# Patient Record
Sex: FEMALE | Race: WHITE | Employment: OTHER | URBAN - METROPOLITAN AREA
[De-identification: names, ages, dates, MRNs, and addresses within clinical notes are randomized per-mention and may not be internally consistent; named-entity substitution may affect disease eponyms.]

---

## 2018-01-24 ENCOUNTER — IP HISTORICAL/CONVERTED ENCOUNTER (OUTPATIENT)
Dept: OTHER | Age: 83
End: 2018-01-24

## 2018-02-28 ENCOUNTER — OP HISTORICAL/CONVERTED ENCOUNTER (OUTPATIENT)
Dept: OTHER | Age: 83
End: 2018-02-28

## 2018-10-07 ENCOUNTER — IP HISTORICAL/CONVERTED ENCOUNTER (OUTPATIENT)
Dept: OTHER | Age: 83
End: 2018-10-07

## 2018-10-16 ENCOUNTER — OP HISTORICAL/CONVERTED ENCOUNTER (OUTPATIENT)
Dept: OTHER | Age: 83
End: 2018-10-16

## 2018-11-09 ENCOUNTER — ED HISTORICAL/CONVERTED ENCOUNTER (OUTPATIENT)
Dept: OTHER | Age: 83
End: 2018-11-09

## 2018-11-26 ENCOUNTER — OP HISTORICAL/CONVERTED ENCOUNTER (OUTPATIENT)
Dept: OTHER | Age: 83
End: 2018-11-26

## 2019-06-27 ENCOUNTER — OP HISTORICAL/CONVERTED ENCOUNTER (OUTPATIENT)
Dept: OTHER | Age: 84
End: 2019-06-27

## 2020-08-25 ENCOUNTER — OP HISTORICAL/CONVERTED ENCOUNTER (OUTPATIENT)
Dept: OTHER | Age: 85
End: 2020-08-25

## 2021-10-22 ENCOUNTER — APPOINTMENT (OUTPATIENT)
Dept: CT IMAGING | Age: 86
End: 2021-10-22
Attending: EMERGENCY MEDICINE
Payer: MEDICARE

## 2021-10-22 ENCOUNTER — APPOINTMENT (OUTPATIENT)
Dept: GENERAL RADIOLOGY | Age: 86
End: 2021-10-22
Attending: EMERGENCY MEDICINE
Payer: MEDICARE

## 2021-10-22 ENCOUNTER — HOSPITAL ENCOUNTER (EMERGENCY)
Age: 86
Discharge: HOME | End: 2021-10-22
Attending: EMERGENCY MEDICINE
Payer: MEDICARE

## 2021-10-22 VITALS
HEIGHT: 62 IN | RESPIRATION RATE: 18 BRPM | OXYGEN SATURATION: 96 % | TEMPERATURE: 98.5 F | BODY MASS INDEX: 27.6 KG/M2 | DIASTOLIC BLOOD PRESSURE: 64 MMHG | HEART RATE: 106 BPM | WEIGHT: 150 LBS | SYSTOLIC BLOOD PRESSURE: 117 MMHG

## 2021-10-22 DIAGNOSIS — S80.11XA CONTUSION OF RIGHT LEG, INITIAL ENCOUNTER: Primary | ICD-10-CM

## 2021-10-22 LAB
ALBUMIN SERPL-MCNC: 3 G/DL (ref 3.5–5)
ALBUMIN/GLOB SERPL: 0.9 {RATIO} (ref 1.1–2.2)
ALP SERPL-CCNC: 60 U/L (ref 45–117)
ALT SERPL-CCNC: 15 U/L (ref 12–78)
ANION GAP SERPL CALC-SCNC: 14 MMOL/L (ref 5–15)
AST SERPL W P-5'-P-CCNC: 23 U/L (ref 15–37)
BASOPHILS # BLD: 0 K/UL (ref 0–0.1)
BASOPHILS NFR BLD: 0 % (ref 0–1)
BILIRUB SERPL-MCNC: 0.6 MG/DL (ref 0.2–1)
BUN SERPL-MCNC: 33 MG/DL (ref 6–20)
BUN/CREAT SERPL: 17 (ref 12–20)
CA-I BLD-MCNC: 9.3 MG/DL (ref 8.5–10.1)
CHLORIDE SERPL-SCNC: 102 MMOL/L (ref 97–108)
CO2 SERPL-SCNC: 24 MMOL/L (ref 21–32)
CREAT SERPL-MCNC: 1.96 MG/DL (ref 0.55–1.02)
DIFFERENTIAL METHOD BLD: ABNORMAL
EOSINOPHIL # BLD: 0.1 K/UL (ref 0–0.4)
EOSINOPHIL NFR BLD: 1 % (ref 0–7)
ERYTHROCYTE [DISTWIDTH] IN BLOOD BY AUTOMATED COUNT: 13 % (ref 11.5–14.5)
GLOBULIN SER CALC-MCNC: 3.3 G/DL (ref 2–4)
GLUCOSE SERPL-MCNC: 225 MG/DL (ref 65–100)
HCT VFR BLD AUTO: 42.4 % (ref 35–47)
HGB BLD-MCNC: 14 G/DL (ref 11.5–16)
IMM GRANULOCYTES # BLD AUTO: 0.1 K/UL (ref 0–0.04)
IMM GRANULOCYTES NFR BLD AUTO: 1 % (ref 0–0.5)
LYMPHOCYTES # BLD: 0.8 K/UL (ref 0.8–3.5)
LYMPHOCYTES NFR BLD: 8 % (ref 12–49)
MCH RBC QN AUTO: 30.6 PG (ref 26–34)
MCHC RBC AUTO-ENTMCNC: 33 G/DL (ref 30–36.5)
MCV RBC AUTO: 92.8 FL (ref 80–99)
MONOCYTES # BLD: 0.7 K/UL (ref 0–1)
MONOCYTES NFR BLD: 7 % (ref 5–13)
NEUTS SEG # BLD: 8.2 K/UL (ref 1.8–8)
NEUTS SEG NFR BLD: 83 % (ref 32–75)
PLATELET # BLD AUTO: 175 K/UL (ref 150–400)
PMV BLD AUTO: 10.5 FL (ref 8.9–12.9)
POTASSIUM SERPL-SCNC: 4.2 MMOL/L (ref 3.5–5.1)
PROT SERPL-MCNC: 6.3 G/DL (ref 6.4–8.2)
RBC # BLD AUTO: 4.57 M/UL (ref 3.8–5.2)
SODIUM SERPL-SCNC: 140 MMOL/L (ref 136–145)
WBC # BLD AUTO: 9.9 K/UL (ref 3.6–11)

## 2021-10-22 PROCEDURE — 99283 EMERGENCY DEPT VISIT LOW MDM: CPT

## 2021-10-22 PROCEDURE — 73562 X-RAY EXAM OF KNEE 3: CPT

## 2021-10-22 PROCEDURE — 36415 COLL VENOUS BLD VENIPUNCTURE: CPT

## 2021-10-22 PROCEDURE — 74011250637 HC RX REV CODE- 250/637: Performed by: EMERGENCY MEDICINE

## 2021-10-22 PROCEDURE — 73590 X-RAY EXAM OF LOWER LEG: CPT

## 2021-10-22 PROCEDURE — 74176 CT ABD & PELVIS W/O CONTRAST: CPT

## 2021-10-22 PROCEDURE — 85025 COMPLETE CBC W/AUTO DIFF WBC: CPT

## 2021-10-22 PROCEDURE — 80053 COMPREHEN METABOLIC PANEL: CPT

## 2021-10-22 RX ORDER — DULOXETIN HYDROCHLORIDE 60 MG/1
CAPSULE, DELAYED RELEASE ORAL
COMMUNITY
Start: 2021-10-04 | End: 2022-09-21

## 2021-10-22 RX ORDER — ACETAMINOPHEN 325 MG/1
650 TABLET ORAL ONCE
Status: COMPLETED | OUTPATIENT
Start: 2021-10-22 | End: 2021-10-22

## 2021-10-22 RX ORDER — FUROSEMIDE 40 MG/1
40 TABLET ORAL
COMMUNITY
Start: 2021-03-02 | End: 2022-03-02

## 2021-10-22 RX ORDER — LANOLIN ALCOHOL/MO/W.PET/CERES
325 CREAM (GRAM) TOPICAL
COMMUNITY

## 2021-10-22 RX ORDER — POTASSIUM CHLORIDE 20MEQ/15ML
LIQUID (ML) ORAL DAILY
COMMUNITY

## 2021-10-22 RX ORDER — VERAPAMIL HYDROCHLORIDE 240 MG/1
240 TABLET, FILM COATED, EXTENDED RELEASE ORAL
COMMUNITY
End: 2022-09-21

## 2021-10-22 RX ORDER — MINERAL OIL
180 ENEMA (ML) RECTAL
COMMUNITY
Start: 2021-03-02 | End: 2022-03-02

## 2021-10-22 RX ORDER — INSULIN GLARGINE 100 [IU]/ML
INJECTION, SOLUTION SUBCUTANEOUS AT BEDTIME
COMMUNITY

## 2021-10-22 RX ORDER — HYDRALAZINE HYDROCHLORIDE 50 MG/1
50 TABLET, FILM COATED ORAL
COMMUNITY
Start: 2021-03-02 | End: 2022-03-02

## 2021-10-22 RX ORDER — LISINOPRIL 20 MG/1
20 TABLET ORAL
COMMUNITY
Start: 2021-03-02 | End: 2022-03-02

## 2021-10-22 RX ADMIN — ACETAMINOPHEN 650 MG: 325 TABLET ORAL at 15:50

## 2021-10-22 NOTE — ED TRIAGE NOTES
Here for abd pain started yesterday, constant RUQ, tender to touch, no hx of cholecystectomy, bowel sounds present, No N, V, Diarrhea, BM today normal per pt. Eating less now, drinking ok, states she has fallen x 2 in past week, says her legs gave out, no bruising to RUQ, said she fell into chairs one week ago, no LOC. Never hit head.

## 2021-10-22 NOTE — ED PROVIDER NOTES
EMERGENCY DEPARTMENT HISTORY AND PHYSICAL EXAM      Date: 10/22/2021  Patient Name: Zoila Poe    History of Presenting Illness     Chief Complaint   Patient presents with    Abdominal Pain    Fall       History Provided By: Patient and Patient's Daughter    HPI: Zoila Poe, 80 y.o. female with a past medical history significant diabetes and hypertension presents to the ED with cc of right upper quadrant abdominal pain and right lower extremity pain status post fall. Patient has had 2 falls within the last week and a half. She states that her legs have \"giving out. \"  Patient denies hitting her head or losing consciousness. She is not on any blood thinners. Patient states that her most recent fall was approximately 3 days ago, however presents to the emergency department today for persistent abdominal pain. She has been taking Tylenol with some relief of her symptoms. She denies nausea, vomiting, diarrhea, constipation, fever, chills, dizziness, lightheadedness. There are no other complaints, changes, or physical findings at this time. PCP: Alena Palmer MD    No current facility-administered medications on file prior to encounter. Current Outpatient Medications on File Prior to Encounter   Medication Sig Dispense Refill    DULoxetine (CYMBALTA) 60 mg capsule       ferrous sulfate 325 mg (65 mg iron) tablet Take 325 mg by mouth.  fexofenadine (ALLEGRA) 180 mg tablet Take 180 mg by mouth.  furosemide (LASIX) 40 mg tablet Take 40 mg by mouth.  hydrALAZINE (APRESOLINE) 50 mg tablet Take 50 mg by mouth.  insulin glargine (LANTUS) 100 unit/mL injection by SubCUTAneous route At bedtime.  lisinopriL (PRINIVIL, ZESTRIL) 20 mg tablet Take 20 mg by mouth.  potassium chloride (KAON 10%) 20 mEq/15 mL solution Take  by mouth daily.  SITagliptin (Januvia) 50 mg tablet Take 50 mg by mouth.  verapamil ER (CALAN-SR) 240 mg CR tablet Take 240 mg by mouth nightly. Past History     Past Medical History:  Past Medical History:   Diagnosis Date    Arthritis     Chronic kidney disease     Diabetes ([facility] [address]     Heart failure ([facility] [address]     Hypertension        Past Surgical History:  Past Surgical History:   Procedure Laterality Date    HX BILATERAL SALPINGO-OOPHORECTOMY  1971       Family History:  History reviewed. No pertinent family history. Social History:  Social History     Tobacco Use    Smoking status: Never Smoker    Smokeless tobacco: Never Used   Substance Use Topics    Alcohol use: Never    Drug use: Never       Allergies:  No Known Allergies      Review of Systems     Review of Systems   Constitutional: Negative for chills and fever. HENT: Negative for congestion and sore throat. Eyes: Negative for pain and visual disturbance. Respiratory: Negative for cough and shortness of breath. Cardiovascular: Negative for chest pain and palpitations. Gastrointestinal: Negative for constipation, diarrhea, nausea and vomiting. Endocrine: Negative for cold intolerance and heat intolerance. Genitourinary: Negative for dysuria and frequency. Musculoskeletal: Negative for arthralgias and myalgias. Skin: Negative for color change and rash. Allergic/Immunologic: Negative for environmental allergies and food allergies. Neurological: Negative for dizziness, weakness, light-headedness and headaches. Hematological: Does not bruise/bleed easily. Psychiatric/Behavioral: Negative for agitation and confusion. Physical Exam     Physical Exam  Constitutional:       Appearance: Normal appearance. HENT:      Head: Normocephalic and atraumatic. Right Ear: External ear normal.      Left Ear: External ear normal.      Nose: Nose normal.      Mouth/Throat:      Mouth: Mucous membranes are moist.   Eyes:      Extraocular Movements: Extraocular movements intact.       Conjunctiva/sclera: Conjunctivae normal.      Pupils: Pupils are equal, round, and reactive to light. Cardiovascular:      Rate and Rhythm: Normal rate and regular rhythm. Pulses: Normal pulses. Heart sounds: Normal heart sounds. Pulmonary:      Effort: Pulmonary effort is normal.      Breath sounds: Normal breath sounds. Abdominal:      General: Abdomen is flat. There is no distension. Palpations: Abdomen is soft. Tenderness: There is no abdominal tenderness. Musculoskeletal:         General: Normal range of motion. Cervical back: Normal range of motion. Skin:     General: Skin is warm and dry. Capillary Refill: Capillary refill takes less than 2 seconds. Findings: Ecchymosis present. Comments: Right lower extremity ecchymosis just distal to the knee with associated overlying abrasion   Neurological:      Mental Status: She is alert and oriented to person, place, and time. Mental status is at baseline. Psychiatric:         Mood and Affect: Mood normal.         Behavior: Behavior normal.         Lab and Diagnostic Study Results     Labs -     Recent Results (from the past 12 hour(s))   CBC WITH AUTOMATED DIFF    Collection Time: 10/22/21  1:27 PM   Result Value Ref Range    WBC 9.9 3.6 - 11.0 K/uL    RBC 4.57 3.80 - 5.20 M/uL    HGB 14.0 11.5 - 16.0 g/dL    HCT 42.4 35.0 - 47.0 %    MCV 92.8 80.0 - 99.0 FL    MCH 30.6 26.0 - 34.0 PG    MCHC 33.0 30.0 - 36.5 g/dL    RDW 13.0 11.5 - 14.5 %    PLATELET 230 281 - 842 K/uL    MPV 10.5 8.9 - 12.9 FL    NEUTROPHILS 83 (H) 32 - 75 %    LYMPHOCYTES 8 (L) 12 - 49 %    MONOCYTES 7 5 - 13 %    EOSINOPHILS 1 0 - 7 %    BASOPHILS 0 0 - 1 %    IMMATURE GRANULOCYTES 1 (H) 0.0 - 0.5 %    ABS. NEUTROPHILS 8.2 (H) 1.8 - 8.0 K/UL    ABS. LYMPHOCYTES 0.8 0.8 - 3.5 K/UL    ABS. MONOCYTES 0.7 0.0 - 1.0 K/UL    ABS. EOSINOPHILS 0.1 0.0 - 0.4 K/UL    ABS. BASOPHILS 0.0 0.0 - 0.1 K/UL    ABS. IMM.  GRANS. 0.1 (H) 0.00 - 0.04 K/UL    DF AUTOMATED     METABOLIC PANEL, COMPREHENSIVE    Collection Time: 10/22/21  1:27 PM   Result Value Ref Range    Sodium 140 136 - 145 mmol/L    Potassium 4.2 3.5 - 5.1 mmol/L    Chloride 102 97 - 108 mmol/L    CO2 24 21 - 32 mmol/L    Anion gap 14 5 - 15 mmol/L    Glucose 225 (H) 65 - 100 mg/dL    BUN 33 (H) 6 - 20 mg/dL    Creatinine 1.96 (H) 0.55 - 1.02 mg/dL    BUN/Creatinine ratio 17 12 - 20      GFR est AA 29 (L) >60 ml/min/1.73m2    GFR est non-AA 24 (L) >60 ml/min/1.73m2    Calcium 9.3 8.5 - 10.1 mg/dL    Bilirubin, total 0.6 0.2 - 1.0 mg/dL    AST (SGOT) 23 15 - 37 U/L    ALT (SGPT) 15 12 - 78 U/L    Alk. phosphatase 60 45 - 117 U/L    Protein, total 6.3 (L) 6.4 - 8.2 g/dL    Albumin 3.0 (L) 3.5 - 5.0 g/dL    Globulin 3.3 2.0 - 4.0 g/dL    A-G Ratio 0.9 (L) 1.1 - 2.2         Radiologic Studies -   @lastxrresult@  CT Results  (Last 48 hours)               10/22/21 1421  CT ABD PELV WO CONT Final result    Impression:  No CT evidence for intra-abdominal/pelvic injury. Cholelithiasis. No bowel obstruction. No ascites. Narrative:  CT abdomen and pelvis without IV contrast.       Axial images are reviewed along with reformatted sagittal/coronal images. No IV   contrast administered. Dose reduction: All CT scans at this facility are performed using dose reduction   optimization techniques as appropriate to a performed exam including the   following-   automated exposure control, adjustments of mA and/or Kv according to patient   size, or use of iterative reconstructive technique. Lung bases are clear. No pleural effusion. Nonenhanced images demonstrate unremarkable appearance for the liver. Gallstones   noted dependently within nondistended gallbladder. Normal volume spleen. Pancreas appears unremarkable. Approximate 1 cm nodular content adrenal glands, probable adenomas. Atrophy each kidney. No hydronephrosis. Stomach and small bowel loops are decompressed. Stool and air through colon. No   ascites. Prior hysterectomy. Phleboliths lower pelvis. Advanced atherosclerotic change normal caliber abdominal aorta with extension to   pelvic arteries. Lumbar spondylosis. No lumbar vertebral body compression deformity. No pelvic   fracture. SI joints and hip joints intact. CXR Results  (Last 48 hours)    None            Medical Decision Making   - I am the first provider for this patient. - I reviewed the vital signs, available nursing notes, past medical history, past surgical history, family history and social history. - Initial assessment performed. The patients presenting problems have been discussed, and they are in agreement with the care plan formulated and outlined with them. I have encouraged them to ask questions as they arise throughout their visit. Vital Signs-Reviewed the patient's vital signs. Patient Vitals for the past 12 hrs:   Temp Pulse Resp BP SpO2   10/22/21 1230 98.5 °F (36.9 °C) (!) 106 18 117/64 96 %         The patient presents with abdominal pain status post fall with a differential diagnosis of contusion, abrasion, blunt organ injury, intra-abdominal hemorrhage      ED Course:          Provider Notes (Medical Decision Making):   Patient is an 49-year-old female who presents for evaluation of right upper quadrant abdominal pain worsening over the last couple of days. She has had multiple recent falls with impact to this area. On physical examination, patient has right upper quadrant abdominal tenderness without Schmitz sign, guarding, rebound, rigidity. She does have contusion and ecchymosis to the right lower extremity. Pain treated with p.o. Tylenol. CBC, CMP, right tib-fib x-ray, CT abdomen pelvis without evidence of acute fracture or injury. Suspect contusion secondary to mechanical fall. Patient instructed to follow-up with her primary care physician and return to the ED if she develops any new or worsening symptoms. She is requesting to go home at this time.   [facility] Procedures   Medical Decision Makingedical Decision Making  Performed by: Celia Duran DO  Procedures       Disposition   Disposition: Condition stable  DC- Adult Discharges: All of the diagnostic tests were reviewed and questions answered. Diagnosis, care plan and treatment options were discussed. The patient understands the instructions and will follow up as directed. The patients results have been reviewed with them. They have been counseled regarding their diagnosis. The patient and family member patient and daughter verbally convey understanding and agreement of the signs, symptoms, diagnosis, treatment and prognosis and additionally agrees to follow up as recommended with their PCP in 24 - 48 hours. They also agree with the care-plan and convey that all of their questions have been answered. I have also put together some discharge instructions for them that include: 1) educational information regarding their diagnosis, 2) how to care for their diagnosis at home, as well a 3) list of reasons why they would want to return to the ED prior to their follow-up appointment, should their condition change. DC-The patient was given verbal contusion instructions    Discharged    DISCHARGE PLAN:  1. Cannot display discharge medications since this patient is not currently admitted. 2.   Follow-up Information     Follow up With Specialties Details Why Contact Info    Freya Olszewski, MD Family Medicine Schedule an appointment as soon as possible for a visit   [facility]      [address] [place] [address] Emergency Medicine  As needed, If symptoms worsen [address]  [phone]        3. Return to ED if worse   4. Discharge Medication List as of 10/22/2021  3:39 PM            Diagnosis     Clinical Impression:   1.  Contusion of right leg, initial encounter        Attestations:    Celia Duran DO    Please note that this dictation was completed with FU[employer]YUAN, the [employer] voice recognition software. Quite often unanticipated grammatical, syntax, homophones, and other interpretive errors are inadvertently transcribed by the computer software. Please disregard these errors. Please excuse any errors that have escaped final proofreading. Thank you.

## 2022-04-27 ENCOUNTER — HOSPITAL ENCOUNTER (EMERGENCY)
Age: 87
Discharge: HOME | End: 2022-04-27
Attending: STUDENT IN AN ORGANIZED HEALTH CARE EDUCATION/TRAINING PROGRAM
Payer: MEDICARE

## 2022-04-27 ENCOUNTER — APPOINTMENT (OUTPATIENT)
Dept: CT IMAGING | Age: 87
End: 2022-04-27
Attending: STUDENT IN AN ORGANIZED HEALTH CARE EDUCATION/TRAINING PROGRAM
Payer: MEDICARE

## 2022-04-27 VITALS
BODY MASS INDEX: 29.07 KG/M2 | TEMPERATURE: 98.3 F | SYSTOLIC BLOOD PRESSURE: 169 MMHG | RESPIRATION RATE: 16 BRPM | WEIGHT: 154 LBS | HEIGHT: 61 IN | DIASTOLIC BLOOD PRESSURE: 85 MMHG | OXYGEN SATURATION: 95 %

## 2022-04-27 DIAGNOSIS — S32.020A CLOSED COMPRESSION FRACTURE OF L2 LUMBAR VERTEBRA, INITIAL ENCOUNTER (HCC): Primary | ICD-10-CM

## 2022-04-27 DIAGNOSIS — N30.00 ACUTE CYSTITIS WITHOUT HEMATURIA: ICD-10-CM

## 2022-04-27 LAB
APPEARANCE UR: CLEAR
BACTERIA URNS QL MICRO: ABNORMAL /HPF
BILIRUB UR QL: ABNORMAL
COLOR UR: YELLOW
EPITH CASTS URNS QL MICRO: ABNORMAL /LPF
GLUCOSE UR STRIP.AUTO-MCNC: NEGATIVE MG/DL
HGB UR QL STRIP: NEGATIVE
KETONES UR QL STRIP.AUTO: 15 MG/DL
LEUKOCYTE ESTERASE UR QL STRIP.AUTO: NEGATIVE
NITRITE UR QL STRIP.AUTO: NEGATIVE
PH UR STRIP: 5 [PH] (ref 5–8)
PROT UR STRIP-MCNC: 30 MG/DL
RBC #/AREA URNS HPF: ABNORMAL /HPF (ref 0–5)
SP GR UR REFRACTOMETRY: 1.02 (ref 1–1.03)
UA: UC IF INDICATED,UAUC: ABNORMAL
UROBILINOGEN UR QL STRIP.AUTO: 0.1 EU/DL (ref 0.2–1)
WBC URNS QL MICRO: ABNORMAL /HPF (ref 0–4)
YEAST URNS QL MICRO: PRESENT

## 2022-04-27 PROCEDURE — 74176 CT ABD & PELVIS W/O CONTRAST: CPT

## 2022-04-27 PROCEDURE — 99284 EMERGENCY DEPT VISIT MOD MDM: CPT

## 2022-04-27 PROCEDURE — 81001 URINALYSIS AUTO W/SCOPE: CPT

## 2022-04-27 PROCEDURE — 74011250637 HC RX REV CODE- 250/637: Performed by: STUDENT IN AN ORGANIZED HEALTH CARE EDUCATION/TRAINING PROGRAM

## 2022-04-27 PROCEDURE — 72131 CT LUMBAR SPINE W/O DYE: CPT

## 2022-04-27 RX ORDER — HYDROCODONE BITARTRATE AND ACETAMINOPHEN 5; 325 MG/1; MG/1
1 TABLET ORAL
Qty: 12 TABLET | Refills: 0 | Status: SHIPPED | OUTPATIENT
Start: 2022-04-27 | End: 2022-04-30

## 2022-04-27 RX ORDER — ACETAMINOPHEN 325 MG/1
650 TABLET ORAL
Qty: 20 TABLET | Refills: 0 | Status: SHIPPED | OUTPATIENT
Start: 2022-04-27 | End: 2022-09-19 | Stop reason: SDUPTHER

## 2022-04-27 RX ORDER — HYDROCODONE BITARTRATE AND ACETAMINOPHEN 5; 325 MG/1; MG/1
1 TABLET ORAL ONCE
Status: COMPLETED | OUTPATIENT
Start: 2022-04-27 | End: 2022-04-27

## 2022-04-27 RX ORDER — HYDRALAZINE HYDROCHLORIDE 50 MG/1
25 TABLET, FILM COATED ORAL 3 TIMES DAILY
COMMUNITY
End: 2022-09-21

## 2022-04-27 RX ORDER — METHOCARBAMOL 750 MG/1
750 TABLET, FILM COATED ORAL
Qty: 20 TABLET | Refills: 0 | Status: SHIPPED | OUTPATIENT
Start: 2022-04-27 | End: 2022-09-19

## 2022-04-27 RX ORDER — LEVOFLOXACIN 750 MG/1
750 TABLET ORAL DAILY
Qty: 7 TABLET | Refills: 0 | Status: SHIPPED | OUTPATIENT
Start: 2022-04-27 | End: 2022-05-04

## 2022-04-27 RX ORDER — METHOCARBAMOL 500 MG/1
750 TABLET, FILM COATED ORAL ONCE
Status: COMPLETED | OUTPATIENT
Start: 2022-04-27 | End: 2022-04-27

## 2022-04-27 RX ADMIN — METHOCARBAMOL TABLETS 750 MG: 500 TABLET, COATED ORAL at 11:44

## 2022-04-27 RX ADMIN — HYDROCODONE BITARTRATE AND ACETAMINOPHEN 1 TABLET: 5; 325 TABLET ORAL at 11:42

## 2022-04-27 NOTE — ED PROVIDER NOTES
[id number]  EMERGENCY DEPARTMENT ENCOUNTER NOTE    Date: 4/27/2022  Patient Name: Nava Lee    History of Presenting Illness     Chief Complaint   Patient presents with    Back Pain     HPI: Nava Lee, 80 y.o. female with a past medical history and home medications as listed below presenting for back pain. The patient presents with low back pain that is described as constant lower lumbar midline and paravetebral pain. - Prior episodes: not as severe  - Weakness or numbness in upper or lower extremities: not worse than chronic weakness  - Trauma: no  - Urine incontinence or retention: no  - Active malignancy: no  - IV drug use: no  - Fever: no  - Medications taken for pain prior to arrival: tylenol and unknwn medicine with minimal relief    Patient denies any abdominal pain, nausea, or vomiting. No dysuria or hematuria. No chest pain or shortness of breath. No other acute complaints. Medical History   I reviewed the medical, surgical, family, and social history, as well as allergies:    PCP: Arnulfo Gan MD    Past Medical History:  Past Medical History:   Diagnosis Date    Arthritis     Chronic kidney disease     Diabetes ([facility] [address]     Heart failure ([facility] [address]     Hypertension      Past Surgical History:  Past Surgical History:   Procedure Laterality Date    HX APPENDECTOMY      HX BILATERAL SALPINGO-OOPHORECTOMY  1971    HX HEENT      lump removal on head    HX HYSTERECTOMY       Current Outpatient Medications:  Current Outpatient Medications   Medication Instructions    acetaminophen (TYLENOL) 650 mg, Oral, EVERY 6 HOURS AS NEEDED    DULoxetine (CYMBALTA) 60 mg capsule No dose, route, or frequency recorded.     ferrous sulfate 325 mg, Oral    hydrALAZINE (APRESOLINE) 25 mg, Oral, 3 TIMES DAILY    HYDROcodone-acetaminophen (Norco) 5-325 mg per tablet 1 Tablet, Oral, EVERY 6 HOURS AS NEEDED    insulin glargine (LANTUS) 100 unit/mL injection SubCUTAneous, AT BEDTIME    levoFLOXacin (LEVAQUIN) 750 mg, Oral, DAILY    methocarbamoL (ROBAXIN-750) 750 mg, Oral, 3 TIMES DAILY AS NEEDED    potassium chloride (KAON 10%) 20 mEq/15 mL solution Oral, DAILY    verapamil ER (CALAN-SR) 240 mg, Oral, EVERY BEDTIME      Family History:  History reviewed. No pertinent family history. Social History:  Social History     Tobacco Use    Smoking status: Never Smoker    Smokeless tobacco: Never Used   Vaping Use    Vaping Use: Never used   Substance Use Topics    Alcohol use: Never    Drug use: Never     Allergies:  No Known Allergies    Review of Systems     All other systems negative. Physical Exam and Vital Signs   Vital Signs - Reviewed the patient's vital signs. Patient Vitals for the past 12 hrs:   Temp Resp BP SpO2   04/27/22 1058 98.3 °F (36.8 °C) 16 (!) 169/85 95 %     Physical Exam:    GENERAL: awake, alert, cooperative, not in distress  HEENT:  * Pupils equal, EOMI  * Head atraumatic  CV:  * Audible heart sounds  * warm and perfused extremities bilaterally  PULMONARY: Good air movement, no wheezes, no crackles  ABDOMEN/: soft, no distension, no guarding, noted suprapubic tenderness, no abdominal tenderness. EXTREMITIES: warm and perfused, no tenderness, no edema  BACK  * No obvious trauma to the back, no ecchymosis, scoliosis, lacerations, or abrasions  * Noted lumbar midline back tenderness  * Noted lumbar paravertebral back tenderness  * No stepoffs or deformities  * Normal power and sensation in bilateral lower extremities  * Normal DP pulses in bilateral lower extremities  * Negative straight leg raise test  * Normal bilateral hip and knee ROM. No tenderness. SKIN: no rashes or signs of trauma  NEURO:  * Speech clear  * Moves U&LE to command    Medical Decision Making   - I am the first and primary provider for this patient and am the primary provider of record.   - I reviewed the vital signs, available nursing notes, past medical history, past surgical history, family history and social history. - Initial assessment performed. The patients presenting problems have been discussed, and the staff are in agreement with the care plan formulated and outlined with them. I have encouraged them to ask questions as they arise throughout their visit. - Available medical records, nursing notes, old EKGs, and EMS run sheets (if patient was EMS transported) were reviewed    MDM:   Patient is a 80 y.o. female presenting for back pain. Vitals reveal no significant abnormalities and physical exam reveals lumbar tenderness. Based on the history, physical exam, risk factors, and vitals signs, I favor the following differential diagnoses: muscle strain, disk herniation/pathology, radiculopathy, neuropathic pain, sprain, muscle spasm, trauma. I doubt cauda equina syndrome due to lack of neurologic findings or red flags. I doubt UTI or pyelonephritis due to absence of urinary symptoms. I doubt aortic pathology due to stable vitals, no red flags on history, and exam consistent with muscular back pain. I doubt ureterolithiasis due to non-suggestive history and examination. Due to age, will test for urine and will obtain CT abdomen to rule out nephrolithiasis and CT lumbar to rule out any cancer or any acute process or compression fractures. The patient has normal vital signs and does not have any sirs criteria with a normal heart rate of 80bpm and respiratory rate on my exam.  We will also test urine to rule out UTI.     Results     Labs:  Recent Results (from the past 12 hour(s))   URINALYSIS W/ REFLEX CULTURE    Collection Time: 04/27/22 11:11 AM    Specimen: Urine   Result Value Ref Range    Color Yellow      Appearance Clear Clear      Specific gravity 1.020 1.003 - 1.030      pH (UA) 5.0 5.0 - 8.0      Protein 30 (A) Negative mg/dL    Glucose Negative Negative mg/dL    Ketone 15 (A) Negative mg/dL    Bilirubin Small (A) Negative      Blood Negative Negative Urobilinogen 0.1 (L) 0.2 - 1.0 EU/dL    Nitrites Negative Negative      Leukocyte Esterase Negative Negative      WBC 5-10 0 - 4 /hpf    RBC 0-5 0 - 5 /hpf    Epithelial cells Many (A) Few /lpf    Bacteria 2+ (A) Negative /hpf    UA:UC IF INDICATED Culture not indicated by UA result Culture not indicated by UA result      Yeast Present (A) Negative       Radiologic Studies:  CT Results  (Last 48 hours)               04/27/22 1206  CT ABD PELV WO CONT Final result    Impression:  New L2 compression deformity as above. Pathologic fracture not   excluded. MRI lumbar spine may provide more detail. Cholelithiasis. No bowel obstruction. Colonic diverticulosis. No CT evidence for appendicitis or   diverticulitis. Advanced abdominal aorta and pelvic artery atherosclerosis. Cortical atrophy bilateral kidneys. Renovascular calcifications. No   hydronephrosis. Narrative:  CT abdomen and pelvis without IV contrast.       Comparison CT abdomen and pelvis October 22, 2021. Axial images are reviewed along with reformatted sagittal/coronal images. No IV   contrast administered. Dose reduction: All CT scans at this facility are performed using dose reduction   optimization techniques as appropriate to a performed exam including the   following-   automated exposure control, adjustments of mA and/or Kv according to patient   size, or use of iterative reconstructive technique. Basilar subsegmental atelectasis. CAD. No pleural effusion. Nonenhanced images demonstrate unremarkable appearance for the liver. Gallstones   noted dependently within gallbladder lumen. Pancreas appears unremarkable. Similar adreniform enlargement bilateral adrenal glands. Normal volume spleen. Cortical atrophy bilateral kidneys unchanged. Renovascular calcifications. No   hydronephrosis. Stomach and small bowel loops are decompressed. Stool and air through colon.  Few scattered colonic diverticula. No CT evidence for appendicitis or   diverticulitis. Bladder nondistended. Dense intimal calcification abdominal aorta with extension into abdominal aorta   and branch vessels and pelvic arteries. Advanced abdominal aorta   atherosclerosis. Lumbar spondylosis. Interval L2 compression deformity with loss of vertebral   body height by approximately 25%. Mild vertebral body sclerosis present. Pathologic fracture not excluded. CXR Results  (Last 48 hours)    None        Medications ordered:  Medications   methocarbamoL (ROBAXIN) tablet 750 mg (750 mg Oral Given 4/27/22 1144)   HYDROcodone-acetaminophen (NORCO) 5-325 mg per tablet 1 Tablet (1 Tablet Oral Given 4/27/22 1142)     ED Course and Reassessment     ED Course:     ED Course as of 04/27/22 1316   Wed Apr 27, 2022   1224 Bacteria noted on UA. No SIRS criteria. Uncomplicated UTI. Will treat with atbx. [SS]   1310 A L2 compression fracture of 25% was noted on CT scan. No other acute abnormality noted. Will give pain medicines and follow-up with spine surgery. A consult was placed to interventional radiology for possible kyphoplasty. [SS]      ED Course User Index  [SS] Aissatou Herman MD       Reassessment:    The patient presents with low back pain without evidence for a more malignant injury or process. Evaluation in the ED was +ve for UTI and L2 compression fx. The patient had improvement of the symptoms with the interventions in the emergency department. After ED evaluation, and due to the absence of findings that indicate neurologic deficits or alarming physical exam or historical features, the patient is a good candidate for outpatient symptomatic management. I instructed the patient that back pain of this nature will take time to improve, but it often does.  Patient was given instructions to return to the emergency department or call primary physician with any worsening symptoms including but not limited to: numbness or weakness in the legs, bowel or bladder problems, numbness in the groin. Patient was also instructed to return or call with any worsening symptomatology or questions. After completion of evaluation and discussion of results and diagnoses with the patient, all the patient's questions were answered. If required, all follow up appointments and treatments were discussed and explained. The patient sounded understanding and agrees with the plan. The patient understands that at this time there is no evidence for a more malignant underlying process, but the patient also understands that early in the process of an illness, an emergency department workup can be falsely reassuring. Routine discharge counseling was given to the patient and the patient understands that worsening, changing or persistent symptoms should prompt an immediate call or follow up with their primary physician or the emergency department. The importance of appropriate follow up was also discussed with the patient. More extensive discharge instructions were given in the patient's discharge paperwork. Final Disposition     DISPOSITION: DISCHARGED    Patient will be discharged from the Emergency Department in stable condition. All of the diagnostic tests were reviewed and any questions were answered. Diagnosis, results, follow up if applicable, and return precautions were discussed. I have also put together printed discharge instructions for them that include: 1) educational information regarding their diagnosis, 2) how to care for their diagnosis at home, as well a 3) list of reasons why they would want to return to the ED prior to their follow-up appointment, should their condition change. Any labs or imaging done in the ED will be either printed with the discharge paperwork or available through [address] DISCHARGE PLAN:  1.    Current Discharge Medication List      CONTINUE these medications which have NOT CHANGED    Details   hydrALAZINE (APRESOLINE) 50 mg tablet Take 25 mg by mouth three (3) times daily. DULoxetine (CYMBALTA) 60 mg capsule       ferrous sulfate 325 mg (65 mg iron) tablet Take 325 mg by mouth. insulin glargine (LANTUS) 100 unit/mL injection by SubCUTAneous route At bedtime. potassium chloride (KAON 10%) 20 mEq/15 mL solution Take  by mouth daily. verapamil ER (CALAN-SR) 240 mg CR tablet Take 240 mg by mouth nightly. 2.   Follow-up Information     Follow up With Specialties Details Why Contact Info    Sydni Rolle MD Orthopedic Surgery Schedule an appointment as soon as possible for a visit in 1 week  [address] [place] [address]  [phone]      [address] [place] [address] Emergency Medicine Go to  If symptoms worsen [address]  [phone]        3. Return to ED if worse    4. Current Discharge Medication List      START taking these medications    Details   levoFLOXacin (LEVAQUIN) 750 mg tablet Take 1 Tablet by mouth daily for 7 days. Qty: 7 Tablet, Refills: 0  Start date: 4/27/2022, End date: 5/4/2022      acetaminophen (TYLENOL) 325 mg tablet Take 2 Tablets by mouth every six (6) hours as needed for Pain. Qty: 20 Tablet, Refills: 0  Start date: 4/27/2022      methocarbamoL (Robaxin-750) 750 mg tablet Take 1 Tablet by mouth three (3) times daily as needed for Muscle Spasm(s). Qty: 20 Tablet, Refills: 0  Start date: 4/27/2022      HYDROcodone-acetaminophen (Norco) 5-325 mg per tablet Take 1 Tablet by mouth every six (6) hours as needed for Pain for up to 3 days. Max Daily Amount: 4 Tablets. Qty: 12 Tablet, Refills: 0  Start date: 4/27/2022, End date: 4/30/2022    Associated Diagnoses: Acute cystitis without hematuria             Diagnosis     Clinical Impression:   1. Closed compression fracture of L2 lumbar vertebra, initial encounter ([facility] [address]    2.  Acute cystitis without hematuria Attestations:    Jimmie Samson MD    Please note that this dictation was completed with [employer], the computer voice recognition software. Quite often unanticipated grammatical, syntax, homophones, and other interpretive errors are inadvertently transcribed by the computer software. Please disregard these errors. Please excuse any errors that have escaped final proofreading. Thank you.

## 2022-04-27 NOTE — ED TRIAGE NOTES
Pt states severe mid back pain that radiates through buttocks for one week. No recent falls or trauma.  ESR.

## 2022-04-27 NOTE — DISCHARGE INSTRUCTIONS
Thank you! Thank you for allowing me to care for you in the emergency department. I sincerely hope that you are satisfied with your visit today. It is my goal to provide you with excellent care. Below you will find a list of your labs and imaging from your visit today. Should you have any questions regarding these results please do not hesitate to call the emergency department. Labs -     Recent Results (from the past 12 hour(s))   URINALYSIS W/ REFLEX CULTURE    Collection Time: 04/27/22 11:11 AM    Specimen: Urine   Result Value Ref Range    Color Yellow      Appearance Clear Clear      Specific gravity 1.020 1.003 - 1.030      pH (UA) 5.0 5.0 - 8.0      Protein 30 (A) Negative mg/dL    Glucose Negative Negative mg/dL    Ketone 15 (A) Negative mg/dL    Bilirubin Small (A) Negative      Blood Negative Negative      Urobilinogen 0.1 (L) 0.2 - 1.0 EU/dL    Nitrites Negative Negative      Leukocyte Esterase Negative Negative      WBC 5-10 0 - 4 /hpf    RBC 0-5 0 - 5 /hpf    Epithelial cells Many (A) Few /lpf    Bacteria 2+ (A) Negative /hpf    UA:UC IF INDICATED Culture not indicated by UA result Culture not indicated by UA result      Yeast Present (A) Negative         Radiologic Studies -   CT ABD PELV WO CONT   Final Result   New L2 compression deformity as above. Pathologic fracture not   excluded. MRI lumbar spine may provide more detail. Cholelithiasis. No bowel obstruction. Colonic diverticulosis. No CT evidence for appendicitis or   diverticulitis. Advanced abdominal aorta and pelvic artery atherosclerosis. Cortical atrophy bilateral kidneys. Renovascular calcifications. No   hydronephrosis. CT SPINE LUMB WO CONT    (Results Pending)     CT Results  (Last 48 hours)                 04/27/22 1206  CT ABD PELV WO CONT Final result    Impression:  New L2 compression deformity as above. Pathologic fracture not   excluded. MRI lumbar spine may provide more detail. Cholelithiasis. No bowel obstruction. Colonic diverticulosis. No CT evidence for appendicitis or   diverticulitis. Advanced abdominal aorta and pelvic artery atherosclerosis. Cortical atrophy bilateral kidneys. Renovascular calcifications. No   hydronephrosis. Narrative:  CT abdomen and pelvis without IV contrast.       Comparison CT abdomen and pelvis October 22, 2021. Axial images are reviewed along with reformatted sagittal/coronal images. No IV   contrast administered. Dose reduction: All CT scans at this facility are performed using dose reduction   optimization techniques as appropriate to a performed exam including the   following-   automated exposure control, adjustments of mA and/or Kv according to patient   size, or use of iterative reconstructive technique. Basilar subsegmental atelectasis. CAD. No pleural effusion. Nonenhanced images demonstrate unremarkable appearance for the liver. Gallstones   noted dependently within gallbladder lumen. Pancreas appears unremarkable. Similar adreniform enlargement bilateral adrenal glands. Normal volume spleen. Cortical atrophy bilateral kidneys unchanged. Renovascular calcifications. No   hydronephrosis. Stomach and small bowel loops are decompressed. Stool and air through colon. Few   scattered colonic diverticula. No CT evidence for appendicitis or   diverticulitis. Bladder nondistended. Dense intimal calcification abdominal aorta with extension into abdominal aorta   and branch vessels and pelvic arteries. Advanced abdominal aorta   atherosclerosis. Lumbar spondylosis. Interval L2 compression deformity with loss of vertebral   body height by approximately 25%. Mild vertebral body sclerosis present. Pathologic fracture not excluded.                  CXR Results  (Last 48 hours)      None               If you feel that you have not received excellent quality care or timely care, please ask to speak to the nurse manager. Please choose us in the future for your continued health care needs. ------------------------------------------------------------------------------------------------------------  The exam and treatment you received in the Emergency Department were for an urgent problem and are not intended as complete care. It is important that you follow-up with a doctor, nurse practitioner, or physician assistant to:  (1) confirm your diagnosis,  (2) re-evaluation of changes in your illness and treatment, and  (3) for ongoing care. If your symptoms become worse or you do not improve as expected and you are unable to reach your usual health care provider, you should return to the Emergency Department. We are available 24 hours a day. Please take your discharge instructions with you when you go to your follow-up appointment. If a prescription has been provided, please have it filled as soon as possible to prevent a delay in treatment. Read the entire medication instruction sheet provided to you by the pharmacy. If you have any questions or reservations about taking the medication due to side effects or interactions with other medications, please call your primary care physician or contact the ER to speak with the charge nurse. Make an appointment with your family doctor or the physician you were referred to for follow-up of this visit as instructed on your discharge paperwork, as this is a mandatory follow-up. Return to the ER if you are unable to be seen or if you are unable to be seen in a timely manner. If you have any problem arranging the follow-up visit, contact the Emergency Department immediately.

## 2022-05-01 ENCOUNTER — APPOINTMENT (OUTPATIENT)
Dept: CT IMAGING | Age: 87
DRG: 478 | End: 2022-05-01
Attending: EMERGENCY MEDICINE
Payer: MEDICARE

## 2022-05-01 ENCOUNTER — HOSPITAL ENCOUNTER (INPATIENT)
Age: 87
LOS: 4 days | Discharge: NURSING, REHAB OR LONG-TERM CARE | DRG: 478 | End: 2022-05-06
Attending: EMERGENCY MEDICINE | Admitting: INTERNAL MEDICINE
Payer: MEDICARE

## 2022-05-01 DIAGNOSIS — S32.020A: ICD-10-CM

## 2022-05-01 DIAGNOSIS — S32.020A CLOSED COMPRESSION FRACTURE OF L2 LUMBAR VERTEBRA, INITIAL ENCOUNTER (HCC): Primary | ICD-10-CM

## 2022-05-01 LAB
ABO + RH BLD: NORMAL
ALBUMIN SERPL-MCNC: 2.9 G/DL (ref 3.5–5)
ALBUMIN/GLOB SERPL: 1 {RATIO} (ref 1.1–2.2)
ALP SERPL-CCNC: 71 U/L (ref 45–117)
ALT SERPL-CCNC: 10 U/L (ref 12–78)
ANION GAP SERPL CALC-SCNC: 5 MMOL/L (ref 5–15)
AST SERPL W P-5'-P-CCNC: 10 U/L (ref 15–37)
BASOPHILS # BLD: 0.1 K/UL (ref 0–0.1)
BASOPHILS NFR BLD: 1 % (ref 0–1)
BILIRUB SERPL-MCNC: 0.3 MG/DL (ref 0.2–1)
BLOOD GROUP ANTIBODIES SERPL: NEGATIVE
BUN SERPL-MCNC: 37 MG/DL (ref 6–20)
BUN/CREAT SERPL: 18 (ref 12–20)
CA-I BLD-MCNC: 9 MG/DL (ref 8.5–10.1)
CHLORIDE SERPL-SCNC: 104 MMOL/L (ref 97–108)
CO2 SERPL-SCNC: 28 MMOL/L (ref 21–32)
CREAT SERPL-MCNC: 2.02 MG/DL (ref 0.55–1.02)
DIFFERENTIAL METHOD BLD: ABNORMAL
EOSINOPHIL # BLD: 0.1 K/UL (ref 0–0.4)
EOSINOPHIL NFR BLD: 2 % (ref 0–7)
ERYTHROCYTE [DISTWIDTH] IN BLOOD BY AUTOMATED COUNT: 13.8 % (ref 11.5–14.5)
GLOBULIN SER CALC-MCNC: 3 G/DL (ref 2–4)
GLUCOSE BLD STRIP.AUTO-MCNC: 134 MG/DL (ref 65–117)
GLUCOSE BLD STRIP.AUTO-MCNC: 167 MG/DL (ref 65–117)
GLUCOSE SERPL-MCNC: 167 MG/DL (ref 65–100)
HCT VFR BLD AUTO: 37.4 % (ref 35–47)
HGB BLD-MCNC: 11.7 G/DL (ref 11.5–16)
IMM GRANULOCYTES # BLD AUTO: 0.1 K/UL (ref 0–0.04)
IMM GRANULOCYTES NFR BLD AUTO: 1 % (ref 0–0.5)
LYMPHOCYTES # BLD: 1.5 K/UL (ref 0.8–3.5)
LYMPHOCYTES NFR BLD: 16 % (ref 12–49)
MCH RBC QN AUTO: 29.3 PG (ref 26–34)
MCHC RBC AUTO-ENTMCNC: 31.3 G/DL (ref 30–36.5)
MCV RBC AUTO: 93.7 FL (ref 80–99)
MONOCYTES # BLD: 0.8 K/UL (ref 0–1)
MONOCYTES NFR BLD: 9 % (ref 5–13)
NEUTS SEG # BLD: 6.9 K/UL (ref 1.8–8)
NEUTS SEG NFR BLD: 71 % (ref 32–75)
NRBC # BLD: 0 K/UL (ref 0–0.01)
NRBC BLD-RTO: 0 PER 100 WBC
PERFORMED BY, TECHID: ABNORMAL
PERFORMED BY, TECHID: ABNORMAL
PLATELET # BLD AUTO: 278 K/UL (ref 150–400)
PMV BLD AUTO: 9.3 FL (ref 8.9–12.9)
POTASSIUM SERPL-SCNC: 3.8 MMOL/L (ref 3.5–5.1)
PROT SERPL-MCNC: 5.9 G/DL (ref 6.4–8.2)
RBC # BLD AUTO: 3.99 M/UL (ref 3.8–5.2)
SODIUM SERPL-SCNC: 137 MMOL/L (ref 136–145)
SPECIMEN EXP DATE BLD: NORMAL
WBC # BLD AUTO: 9.5 K/UL (ref 3.6–11)

## 2022-05-01 PROCEDURE — 36415 COLL VENOUS BLD VENIPUNCTURE: CPT

## 2022-05-01 PROCEDURE — 80053 COMPREHEN METABOLIC PANEL: CPT

## 2022-05-01 PROCEDURE — 93005 ELECTROCARDIOGRAM TRACING: CPT

## 2022-05-01 PROCEDURE — 74011000250 HC RX REV CODE- 250: Performed by: NURSE PRACTITIONER

## 2022-05-01 PROCEDURE — 99285 EMERGENCY DEPT VISIT HI MDM: CPT

## 2022-05-01 PROCEDURE — 86900 BLOOD TYPING SEROLOGIC ABO: CPT

## 2022-05-01 PROCEDURE — 74011250637 HC RX REV CODE- 250/637: Performed by: EMERGENCY MEDICINE

## 2022-05-01 PROCEDURE — 74011000250 HC RX REV CODE- 250: Performed by: EMERGENCY MEDICINE

## 2022-05-01 PROCEDURE — G0378 HOSPITAL OBSERVATION PER HR: HCPCS

## 2022-05-01 PROCEDURE — 74011250637 HC RX REV CODE- 250/637: Performed by: NURSE PRACTITIONER

## 2022-05-01 PROCEDURE — 85025 COMPLETE CBC W/AUTO DIFF WBC: CPT

## 2022-05-01 PROCEDURE — 82962 GLUCOSE BLOOD TEST: CPT

## 2022-05-01 RX ORDER — HYDROCODONE BITARTRATE AND ACETAMINOPHEN 5; 325 MG/1; MG/1
1 TABLET ORAL
Status: COMPLETED | OUTPATIENT
Start: 2022-05-01 | End: 2022-05-01

## 2022-05-01 RX ORDER — LISINOPRIL 10 MG/1
20 TABLET ORAL DAILY
Status: DISCONTINUED | OUTPATIENT
Start: 2022-05-02 | End: 2022-05-06 | Stop reason: HOSPADM

## 2022-05-01 RX ORDER — SODIUM CHLORIDE 0.9 % (FLUSH) 0.9 %
5-40 SYRINGE (ML) INJECTION EVERY 8 HOURS
Status: DISCONTINUED | OUTPATIENT
Start: 2022-05-01 | End: 2022-05-06 | Stop reason: HOSPADM

## 2022-05-01 RX ORDER — LANOLIN ALCOHOL/MO/W.PET/CERES
3 CREAM (GRAM) TOPICAL
COMMUNITY

## 2022-05-01 RX ORDER — FUROSEMIDE 40 MG/1
40 TABLET ORAL DAILY
COMMUNITY

## 2022-05-01 RX ORDER — ALLOPURINOL 300 MG/1
300 TABLET ORAL DAILY
COMMUNITY

## 2022-05-01 RX ORDER — MELATONIN
2000 DAILY
Status: DISCONTINUED | OUTPATIENT
Start: 2022-05-02 | End: 2022-05-06 | Stop reason: HOSPADM

## 2022-05-01 RX ORDER — LIDOCAINE 4 G/100G
1 PATCH TOPICAL
Status: COMPLETED | OUTPATIENT
Start: 2022-05-01 | End: 2022-05-02

## 2022-05-01 RX ORDER — ALOGLIPTIN 6.25 MG/1
6.25 TABLET, FILM COATED ORAL DAILY
Status: DISCONTINUED | OUTPATIENT
Start: 2022-05-02 | End: 2022-05-06 | Stop reason: HOSPADM

## 2022-05-01 RX ORDER — FAMOTIDINE 20 MG/1
20 TABLET, FILM COATED ORAL DAILY
Status: DISCONTINUED | OUTPATIENT
Start: 2022-05-02 | End: 2022-05-06 | Stop reason: HOSPADM

## 2022-05-01 RX ORDER — ASPIRIN 81 MG/1
81 TABLET ORAL DAILY
Status: DISCONTINUED | OUTPATIENT
Start: 2022-05-02 | End: 2022-05-06 | Stop reason: HOSPADM

## 2022-05-01 RX ORDER — BISACODYL 5 MG
5 TABLET, DELAYED RELEASE (ENTERIC COATED) ORAL DAILY PRN
Status: DISCONTINUED | OUTPATIENT
Start: 2022-05-01 | End: 2022-05-06 | Stop reason: HOSPADM

## 2022-05-01 RX ORDER — LANOLIN ALCOHOL/MO/W.PET/CERES
325 CREAM (GRAM) TOPICAL
Status: DISCONTINUED | OUTPATIENT
Start: 2022-05-02 | End: 2022-05-06 | Stop reason: HOSPADM

## 2022-05-01 RX ORDER — CHOLECALCIFEROL (VITAMIN D3) 125 MCG
1 CAPSULE ORAL DAILY
COMMUNITY

## 2022-05-01 RX ORDER — ASPIRIN 81 MG/1
81 TABLET ORAL DAILY
COMMUNITY
End: 2022-09-21

## 2022-05-01 RX ORDER — MINERAL OIL
ENEMA (ML) RECTAL
COMMUNITY
Start: 2022-03-09

## 2022-05-01 RX ORDER — ALLOPURINOL 300 MG/1
300 TABLET ORAL DAILY
Status: DISCONTINUED | OUTPATIENT
Start: 2022-05-02 | End: 2022-05-06 | Stop reason: HOSPADM

## 2022-05-01 RX ORDER — DULOXETIN HYDROCHLORIDE 60 MG/1
CAPSULE, DELAYED RELEASE ORAL
COMMUNITY
Start: 2022-04-20 | End: 2022-09-19 | Stop reason: SDUPTHER

## 2022-05-01 RX ORDER — HYDROCODONE BITARTRATE AND ACETAMINOPHEN 5; 325 MG/1; MG/1
1 TABLET ORAL
Status: DISCONTINUED | OUTPATIENT
Start: 2022-05-01 | End: 2022-05-06 | Stop reason: HOSPADM

## 2022-05-01 RX ORDER — LANOLIN ALCOHOL/MO/W.PET/CERES
325 CREAM (GRAM) TOPICAL
COMMUNITY
End: 2022-09-19 | Stop reason: SDUPTHER

## 2022-05-01 RX ORDER — LISINOPRIL 20 MG/1
TABLET ORAL
COMMUNITY
Start: 2022-03-09

## 2022-05-01 RX ORDER — CETIRIZINE HCL 10 MG
10 TABLET ORAL DAILY
Status: DISCONTINUED | OUTPATIENT
Start: 2022-05-02 | End: 2022-05-06 | Stop reason: HOSPADM

## 2022-05-01 RX ORDER — ACETAMINOPHEN 650 MG/1
650 SUPPOSITORY RECTAL
Status: DISCONTINUED | OUTPATIENT
Start: 2022-05-01 | End: 2022-05-06 | Stop reason: HOSPADM

## 2022-05-01 RX ORDER — SITAGLIPTIN 50 MG/1
TABLET, FILM COATED ORAL
COMMUNITY
Start: 2022-03-09

## 2022-05-01 RX ORDER — ACETAMINOPHEN 325 MG/1
650 TABLET ORAL
Status: DISCONTINUED | OUTPATIENT
Start: 2022-05-01 | End: 2022-05-06 | Stop reason: HOSPADM

## 2022-05-01 RX ORDER — SODIUM CHLORIDE 0.9 % (FLUSH) 0.9 %
5-40 SYRINGE (ML) INJECTION AS NEEDED
Status: DISCONTINUED | OUTPATIENT
Start: 2022-05-01 | End: 2022-05-06 | Stop reason: HOSPADM

## 2022-05-01 RX ORDER — HYDRALAZINE HYDROCHLORIDE 50 MG/1
50 TABLET, FILM COATED ORAL 2 TIMES DAILY
COMMUNITY
End: 2022-09-19 | Stop reason: SDUPTHER

## 2022-05-01 RX ORDER — FUROSEMIDE 40 MG/1
40 TABLET ORAL DAILY
Status: DISCONTINUED | OUTPATIENT
Start: 2022-05-02 | End: 2022-05-06 | Stop reason: HOSPADM

## 2022-05-01 RX ORDER — INSULIN LISPRO 100 [IU]/ML
INJECTION, SOLUTION INTRAVENOUS; SUBCUTANEOUS
Status: DISCONTINUED | OUTPATIENT
Start: 2022-05-01 | End: 2022-05-06 | Stop reason: HOSPADM

## 2022-05-01 RX ORDER — MAGNESIUM SULFATE 100 %
4 CRYSTALS MISCELLANEOUS AS NEEDED
Status: DISCONTINUED | OUTPATIENT
Start: 2022-05-01 | End: 2022-05-06 | Stop reason: HOSPADM

## 2022-05-01 RX ORDER — HYDROCODONE BITARTRATE AND ACETAMINOPHEN 5; 325 MG/1; MG/1
TABLET ORAL
COMMUNITY
Start: 2022-04-27 | End: 2022-09-19 | Stop reason: SDUPTHER

## 2022-05-01 RX ORDER — LANOLIN ALCOHOL/MO/W.PET/CERES
3 CREAM (GRAM) TOPICAL
Status: DISCONTINUED | OUTPATIENT
Start: 2022-05-01 | End: 2022-05-06 | Stop reason: HOSPADM

## 2022-05-01 RX ORDER — VERAPAMIL HYDROCHLORIDE 120 MG/1
240 CAPSULE, EXTENDED RELEASE ORAL
Status: DISCONTINUED | OUTPATIENT
Start: 2022-05-02 | End: 2022-05-06 | Stop reason: HOSPADM

## 2022-05-01 RX ORDER — HYDRALAZINE HYDROCHLORIDE 50 MG/1
50 TABLET, FILM COATED ORAL 2 TIMES DAILY
Status: DISCONTINUED | OUTPATIENT
Start: 2022-05-01 | End: 2022-05-06 | Stop reason: HOSPADM

## 2022-05-01 RX ORDER — INSULIN GLARGINE 100 [IU]/ML
INJECTION, SOLUTION SUBCUTANEOUS
COMMUNITY
Start: 2022-03-09 | End: 2022-09-21

## 2022-05-01 RX ORDER — ONDANSETRON 2 MG/ML
4 INJECTION INTRAMUSCULAR; INTRAVENOUS
Status: DISCONTINUED | OUTPATIENT
Start: 2022-05-01 | End: 2022-05-06 | Stop reason: HOSPADM

## 2022-05-01 RX ORDER — POLYETHYLENE GLYCOL 3350 17 G/17G
17 POWDER, FOR SOLUTION ORAL DAILY PRN
Status: DISCONTINUED | OUTPATIENT
Start: 2022-05-01 | End: 2022-05-06 | Stop reason: HOSPADM

## 2022-05-01 RX ORDER — VERAPAMIL HYDROCHLORIDE 240 MG/1
TABLET, FILM COATED, EXTENDED RELEASE ORAL
COMMUNITY
Start: 2022-03-09 | End: 2022-09-19 | Stop reason: SDUPTHER

## 2022-05-01 RX ORDER — ONDANSETRON 4 MG/1
4 TABLET, ORALLY DISINTEGRATING ORAL
Status: DISCONTINUED | OUTPATIENT
Start: 2022-05-01 | End: 2022-05-06 | Stop reason: HOSPADM

## 2022-05-01 RX ORDER — DULOXETIN HYDROCHLORIDE 30 MG/1
30 CAPSULE, DELAYED RELEASE ORAL DAILY
Status: DISCONTINUED | OUTPATIENT
Start: 2022-05-02 | End: 2022-05-06 | Stop reason: HOSPADM

## 2022-05-01 RX ADMIN — SODIUM CHLORIDE, PRESERVATIVE FREE 10 ML: 5 INJECTION INTRAVENOUS at 16:31

## 2022-05-01 RX ADMIN — HYDROCODONE BITARTRATE AND ACETAMINOPHEN 1 TABLET: 5; 325 TABLET ORAL at 15:50

## 2022-05-01 RX ADMIN — HYDRALAZINE HYDROCHLORIDE 50 MG: 50 TABLET, FILM COATED ORAL at 22:23

## 2022-05-01 RX ADMIN — HYDROCODONE BITARTRATE AND ACETAMINOPHEN 1 TABLET: 5; 325 TABLET ORAL at 22:23

## 2022-05-01 RX ADMIN — MELATONIN TAB 3 MG 3 MG: 3 TAB at 22:23

## 2022-05-01 NOTE — H&P
4               History and Physical    Patient: Qi Roberson MRN: [id number]  SSN: [id number]    YOB: 1932  Age: 80 y.o. Sex: female      Subjective:      Qi Roberson is a 80 y.o. female who has a PMH significant for CHF, DM, HTN and CKD. She comes to the emergency room with back pain, started 2 weeks PTA. No recent falls no saddle anesthesia bowel or bladder incontinence. CT of lumbar spine done on 4/27 revealing L2 compression fracture that appeared to be acute. No indication for MRI at this time. Admitted for further management of closed compression fracture of L2 lumbar vertebra. Orthopedic surgery consulted. Past Medical History:   Diagnosis Date    CHF (congestive heart failure) (HCC)     Chronic kidney disease     Diabetes ([address]     Hypertension      History reviewed. No pertinent surgical history. Family History   Problem Relation Age of Onset    Hypertension Mother      Social History     Tobacco Use    Smoking status: Not on file    Smokeless tobacco: Not on file   Substance Use Topics    Alcohol use: Not on file      Prior to Admission medications    Medication Sig Start Date End Date Taking? Authorizing Provider   DULoxetine (CYMBALTA) 60 mg capsule  4/20/22  Yes Provider, Historical   fexofenadine (ALLEGRA) 180 mg tablet  3/9/22  Yes Provider, Historical   HYDROcodone-acetaminophen (NORCO) 5-325 mg per tablet  4/27/22  Yes Provider, Historical   Lantus Solostar U-100 Insulin 100 unit/mL (3 mL) inpn  3/9/22  Yes Provider, Historical   lisinopriL (PRINIVIL, ZESTRIL) 20 mg tablet  3/9/22  Yes Provider, Historical   Januvia 50 mg tablet  3/9/22  Yes Provider, Historical   verapamil ER (CALAN-SR) 240 mg CR tablet  3/9/22  Yes Provider, Historical   hydrALAZINE (APRESOLINE) 50 mg tablet Take 50 mg by mouth two (2) times a day. Yes Provider, Historical   ferrous sulfate 325 mg (65 mg iron) tablet Take 325 mg by mouth Daily (before breakfast).    Yes Provider, Historical aspirin delayed-release 81 mg tablet Take 81 mg by mouth daily. Yes Provider, Historical   allopurinoL (ZYLOPRIM) 300 mg tablet Take 300 mg by mouth daily. Yes Provider, Historical   melatonin 3 mg tablet Take 3 mg by mouth nightly. Yes Provider, Historical        No Known Allergies    Review of Systems   Constitutional: Positive for malaise/fatigue. HENT: Negative for congestion. Respiratory: Negative for cough. Cardiovascular: Negative for chest pain. Musculoskeletal: Positive for back pain. Neurological: Positive for weakness. Psychiatric/Behavioral: Positive for memory loss. Objective:     Vitals:    05/01/22 1352 05/01/22 1519   BP: (!) 137/57 128/66   Pulse: 62 61   Resp: 18 18   Temp: 98 °F (36.7 °C)    SpO2: 95% 95%   Weight: 69.9 kg (154 lb)    Height: 5' 2\" (1.575 m)         Physical Exam  Constitutional:       Appearance: She is ill-appearing. Cardiovascular:      Rate and Rhythm: Normal rate and regular rhythm. Pulmonary:      Effort: No respiratory distress. Abdominal:      General: There is no distension. Skin:     Coloration: Skin is pale. Neurological:      Motor: Weakness present. Gait: Gait abnormal.           Recent Results (from the past 24 hour(s))   CBC WITH AUTOMATED DIFF    Collection Time: 05/01/22  3:42 PM   Result Value Ref Range    WBC 9.5 3.6 - 11.0 K/uL    RBC 3.99 3.80 - 5.20 M/uL    HGB 11.7 11.5 - 16.0 g/dL    HCT 37.4 35.0 - 47.0 %    MCV 93.7 80.0 - 99.0 FL    MCH 29.3 26.0 - 34.0 PG    MCHC 31.3 30.0 - 36.5 g/dL    RDW 13.8 11.5 - 14.5 %    PLATELET 243 762 - 008 K/uL    MPV 9.3 8.9 - 12.9 FL    NRBC 0.0 0.0  WBC    ABSOLUTE NRBC 0.00 0.00 - 0.01 K/uL    NEUTROPHILS 71 32 - 75 %    LYMPHOCYTES 16 12 - 49 %    MONOCYTES 9 5 - 13 %    EOSINOPHILS 2 0 - 7 %    BASOPHILS 1 0 - 1 %    IMMATURE GRANULOCYTES 1 (H) 0 - 0.5 %    ABS. NEUTROPHILS 6.9 1.8 - 8.0 K/UL    ABS. LYMPHOCYTES 1.5 0.8 - 3.5 K/UL    ABS.  MONOCYTES 0.8 0.0 - 1.0 K/UL ABS. EOSINOPHILS 0.1 0.0 - 0.4 K/UL    ABS. BASOPHILS 0.1 0.0 - 0.1 K/UL    ABS. IMM. GRANS. 0.1 (H) 0.00 - 0.04 K/UL    DF AUTOMATED         XR Results (maximum last 3): No results found for this or any previous visit. CT Results (maximum last 3): No results found for this or any previous visit. MRI Results (maximum last 3): No results found for this or any previous visit. Nuclear Medicine Results (maximum last 3): No results found for this or any previous visit. US Results (maximum last 3): No results found for this or any previous visit.       Active Problems:    Compression fracture of L2 ([address] (5/1/2022)        Assessment/Plan:     L2 closed compression fracture  Back pain  CT of spine on 4/27 confirming  Orthopedic surgery [id number] for kyphoplasty  Norco for pain  Started on Cymbalta  Hemoglobin stable at 11.7    HTN  Continue home medications verapamil, lisinopril, hydralazine Lasix and aspirin    History of CHF  Continue diuretic    DM  Continue Januvia  Accu-Cheks with SSI, will hold Lantus till after surgery    CKD  Creatinine 2.2, baseline unknown      DVT Prophylaxis:  Code Status:  POA/NOK:  Total Time spent in direct and indirect care including assessment review of labs and coordination of services and consultations: Greater than 75 minutes      Signed By: Olivia Matthews NP     May 1, 2022

## 2022-05-01 NOTE — ED PROVIDER NOTES
EMERGENCY DEPARTMENT HISTORY AND PHYSICAL EXAM        Date: 5/1/2022  Patient Name: Francisco Weeks    History of Presenting Illness     Chief Complaint   Patient presents with    Back Pain     History Provided By: Patient    HPI: Francisco Weeks, 80 y.o. female with history of congestive heart failure, diabetes, hypertension, and chronic kidney failure who presents with low back pain. States pain started about 2 weeks ago. She was sleeping on the recliner. When she got up she noted she was having pain in her low back. No recent falls. Most recent fall was in December 2021 but she did not injure her back then. No saddle anesthesia, bowel or bladder problems, weakness. No fevers or IV drug use history. No history of cancer. PCP: Tirso Al MD        Past History     Past Medical History:  CHF, diabetes, hypertension, and chronic kidney failure    Past Surgical History:  Reviewed and noncontributory    Family History:  Reviewed and noncontributory    Social History:  Social History     Tobacco Use    Smoking status: Not on file    Smokeless tobacco: Not on file   Substance Use Topics    Alcohol use: Not on file    Drug use: Not on file       Allergies:  No Known Allergies    Review of Systems   Review of Systems   Constitutional: Negative for fever. HENT: Negative for congestion. Eyes: Negative for visual disturbance. Respiratory: Negative for shortness of breath. Cardiovascular: Negative for chest pain. Gastrointestinal: Negative for abdominal pain. Genitourinary: Negative for dysuria. Musculoskeletal: Positive for back pain. Negative for arthralgias. Skin: Negative for rash. Neurological: Negative for headaches. Physical Exam   Constitutional: No acute distress. Well-nourished. Skin: No rash. ENT: No rhinorrhea. No cough. Head is normocephalic and atraumatic. Eye: No proptosis or conjunctival injections. Respiratory: No apparent respiratory distress.   Gastrointestinal: Nondistended. Musculoskeletal: No significant tenderness to the lumbar or thoracic spine. 5/5 strength in lower extremities. Cardio: Normal peripheral perfusion. Diagnostic Study Results     Labs -   No results found for this or any previous visit (from the past 24 hour(s)). Radiologic Studies -   No orders to display     CT Results  (Last 48 hours)    None        CXR Results  (Last 48 hours)    None          Medical Decision Making and ED Course     I reviewed the available vital signs, nursing notes, past medical history, past surgical history, family history, and social history. Vital Signs - Reviewed the patient's vital signs. Patient Vitals for the past 12 hrs:   Temp Pulse Resp BP SpO2   05/01/22 1519 -- 61 18 128/66 95 %   05/01/22 1352 98 °F (36.7 °C) 62 18 (!) 137/57 95 %     Medical Decision Making:   Presented with low back pain. The differential diagnosis is lumbar fracture, lumbar strain, osteoporosis. Patient had a CT of the lumbar spine done on 27 April that showed L2 compression fracture that appear to be acute. Patient is having significant pain at home. I discussed this case with the orthopedic on-call, Dr. Ish Bonilla, who felt patient may benefit from kyphoplasty or other intervention. He will have spinal doctor consult. Patient may benefit from IR consultation as well. Discussed with hospitalist and will plan for further care. I did order lidocaine patch and Norco for pain control. No neurological deficits or warning signs that would warrant emergent MRI at this time. She likely will benefit from an MRI though. Disposition     Admitted to hospitalist    Diagnosis     Clinical impression:   1. Closed compression fracture of L2 lumbar vertebra, initial encounter [facility])           Attestation:  Please note that this dictation was completed with [employer]io, the [employer] voice recognition software.  Quite often unanticipated grammatical, syntax, homophones, and other interpretive errors are inadvertently transcribed by the computer software. Please disregard these errors. Please excuse any errors that have escaped final proofreading. Thank you.   Seth Husbands, DO

## 2022-05-01 NOTE — ED TRIAGE NOTES
Per ems seen at [facility] AND [facility] fre[facility]anding on 4/27/22 dx with compression fracture of L2. Pt states pain is worsening.

## 2022-05-02 PROBLEM — S32.020A COMPRESSION FRACTURE OF L2 LUMBAR VERTEBRA (HCC): Status: ACTIVE | Noted: 2022-05-02

## 2022-05-02 LAB
ANION GAP SERPL CALC-SCNC: 6 MMOL/L (ref 5–15)
ATRIAL RATE: 68 BPM
BUN SERPL-MCNC: 30 MG/DL (ref 6–20)
BUN/CREAT SERPL: 19 (ref 12–20)
CA-I BLD-MCNC: 8.7 MG/DL (ref 8.5–10.1)
CALCULATED P AXIS, ECG09: 53 DEGREES
CALCULATED R AXIS, ECG10: -32 DEGREES
CALCULATED T AXIS, ECG11: -8 DEGREES
CHLORIDE SERPL-SCNC: 105 MMOL/L (ref 97–108)
CO2 SERPL-SCNC: 29 MMOL/L (ref 21–32)
CREAT SERPL-MCNC: 1.55 MG/DL (ref 0.55–1.02)
DIAGNOSIS, 93000: NORMAL
ERYTHROCYTE [DISTWIDTH] IN BLOOD BY AUTOMATED COUNT: 13.9 % (ref 11.5–14.5)
GLUCOSE BLD STRIP.AUTO-MCNC: 118 MG/DL (ref 65–117)
GLUCOSE BLD STRIP.AUTO-MCNC: 128 MG/DL (ref 65–117)
GLUCOSE BLD STRIP.AUTO-MCNC: 145 MG/DL (ref 65–117)
GLUCOSE BLD STRIP.AUTO-MCNC: 165 MG/DL (ref 65–117)
GLUCOSE BLD STRIP.AUTO-MCNC: 194 MG/DL (ref 65–117)
GLUCOSE SERPL-MCNC: 112 MG/DL (ref 65–100)
HCT VFR BLD AUTO: 36.4 % (ref 35–47)
HGB BLD-MCNC: 11.6 G/DL (ref 11.5–16)
MCH RBC QN AUTO: 29.7 PG (ref 26–34)
MCHC RBC AUTO-ENTMCNC: 31.9 G/DL (ref 30–36.5)
MCV RBC AUTO: 93.3 FL (ref 80–99)
NRBC # BLD: 0 K/UL (ref 0–0.01)
NRBC BLD-RTO: 0 PER 100 WBC
P-R INTERVAL, ECG05: 136 MS
PERFORMED BY, TECHID: ABNORMAL
PLATELET # BLD AUTO: 257 K/UL (ref 150–400)
PMV BLD AUTO: 9.6 FL (ref 8.9–12.9)
POTASSIUM SERPL-SCNC: 4 MMOL/L (ref 3.5–5.1)
Q-T INTERVAL, ECG07: 466 MS
QRS DURATION, ECG06: 112 MS
QTC CALCULATION (BEZET), ECG08: 495 MS
RBC # BLD AUTO: 3.9 M/UL (ref 3.8–5.2)
SODIUM SERPL-SCNC: 140 MMOL/L (ref 136–145)
VENTRICULAR RATE, ECG03: 68 BPM
WBC # BLD AUTO: 7.4 K/UL (ref 3.6–11)

## 2022-05-02 PROCEDURE — 74011636637 HC RX REV CODE- 636/637: Performed by: NURSE PRACTITIONER

## 2022-05-02 PROCEDURE — 85027 COMPLETE CBC AUTOMATED: CPT

## 2022-05-02 PROCEDURE — G0378 HOSPITAL OBSERVATION PER HR: HCPCS

## 2022-05-02 PROCEDURE — 74011000250 HC RX REV CODE- 250: Performed by: NURSE PRACTITIONER

## 2022-05-02 PROCEDURE — 74011250637 HC RX REV CODE- 250/637: Performed by: NURSE PRACTITIONER

## 2022-05-02 PROCEDURE — 65270000029 HC RM PRIVATE

## 2022-05-02 PROCEDURE — 82962 GLUCOSE BLOOD TEST: CPT

## 2022-05-02 PROCEDURE — 80048 BASIC METABOLIC PNL TOTAL CA: CPT

## 2022-05-02 PROCEDURE — 36415 COLL VENOUS BLD VENIPUNCTURE: CPT

## 2022-05-02 RX ADMIN — SODIUM CHLORIDE, PRESERVATIVE FREE 10 ML: 5 INJECTION INTRAVENOUS at 14:00

## 2022-05-02 RX ADMIN — LISINOPRIL 20 MG: 10 TABLET ORAL at 12:28

## 2022-05-02 RX ADMIN — ASPIRIN 81 MG: 81 TABLET, COATED ORAL at 12:28

## 2022-05-02 RX ADMIN — HYDROCODONE BITARTRATE AND ACETAMINOPHEN 1 TABLET: 5; 325 TABLET ORAL at 12:28

## 2022-05-02 RX ADMIN — HYDRALAZINE HYDROCHLORIDE 50 MG: 50 TABLET, FILM COATED ORAL at 21:17

## 2022-05-02 RX ADMIN — VERAPAMIL HYDROCHLORIDE 240 MG: 120 CAPSULE, DELAYED RELEASE PELLETS ORAL at 12:28

## 2022-05-02 RX ADMIN — ALOGLIPTIN 6.25 MG: 6.25 TABLET, FILM COATED ORAL at 12:28

## 2022-05-02 RX ADMIN — DULOXETINE 30 MG: 30 CAPSULE, DELAYED RELEASE ORAL at 12:29

## 2022-05-02 RX ADMIN — FAMOTIDINE 20 MG: 20 TABLET ORAL at 12:28

## 2022-05-02 RX ADMIN — SODIUM CHLORIDE, PRESERVATIVE FREE 10 ML: 5 INJECTION INTRAVENOUS at 21:19

## 2022-05-02 RX ADMIN — ALLOPURINOL 300 MG: 300 TABLET ORAL at 12:28

## 2022-05-02 RX ADMIN — CETIRIZINE HYDROCHLORIDE 10 MG: 10 TABLET, FILM COATED ORAL at 12:28

## 2022-05-02 RX ADMIN — Medication 2000 UNITS: at 12:28

## 2022-05-02 RX ADMIN — SODIUM CHLORIDE, PRESERVATIVE FREE 10 ML: 5 INJECTION INTRAVENOUS at 03:44

## 2022-05-02 RX ADMIN — FERROUS SULFATE TAB 325 MG (65 MG ELEMENTAL FE) 325 MG: 325 (65 FE) TAB at 12:29

## 2022-05-02 RX ADMIN — HYDROCODONE BITARTRATE AND ACETAMINOPHEN 1 TABLET: 5; 325 TABLET ORAL at 03:43

## 2022-05-02 RX ADMIN — FUROSEMIDE 40 MG: 40 TABLET ORAL at 12:29

## 2022-05-02 RX ADMIN — HYDRALAZINE HYDROCHLORIDE 50 MG: 50 TABLET, FILM COATED ORAL at 12:29

## 2022-05-02 RX ADMIN — MELATONIN TAB 3 MG 3 MG: 3 TAB at 21:17

## 2022-05-02 RX ADMIN — INSULIN LISPRO 3 UNITS: 100 INJECTION, SOLUTION INTRAVENOUS; SUBCUTANEOUS at 16:48

## 2022-05-02 NOTE — PROGRESS NOTES
Problem: Pressure Injury - Risk of  Goal: *Prevention of pressure injury  Description: Document Chris Scale and appropriate interventions in the flowsheet. Outcome: Progressing Towards Goal  Note: Pressure Injury Interventions:  Sensory Interventions: Assess changes in LOC,Avoid rigorous massage over bony prominences,Float heels,Keep linens dry and wrinkle-free,Minimize linen layers,Monitor skin under medical devices    Moisture Interventions: Absorbent underpads,Apply protective barrier, creams and emollients,Check for incontinence Q2 hours and as needed,Internal/External urinary devices    Activity Interventions: PT/OT evaluation    Mobility Interventions: Float heels,PT/OT evaluation,HOB 30 degrees or less,Turn and reposition approx. every two hours(pillow and wedges)    Nutrition Interventions: Document food/fluid/supplement intake    Friction and Shear Interventions: Apply protective barrier, creams and emollients,Transferring/repositioning devices                Problem: Falls - Risk of  Goal: *Absence of Falls  Description: Document Amy Fall Risk and appropriate interventions in the flowsheet.   Outcome: Progressing Towards Goal  Note: Fall Risk Interventions:  Mobility Interventions: Bed/chair exit alarm,Patient to call before getting OOB         Medication Interventions: Bed/chair exit alarm,Assess postural VS orthostatic hypotension,Teach patient to arise slowly

## 2022-05-02 NOTE — PROGRESS NOTES
Problem: Falls - Risk of  Goal: *Absence of Falls  Description: Document Zulma Worthington Fall Risk and appropriate interventions in the flowsheet. Outcome: Progressing Towards Goal  Note: Fall Risk Interventions:  Mobility Interventions: Bed/chair exit alarm         Medication Interventions: Bed/chair exit alarm,Assess postural VS orthostatic hypotension,Teach patient to arise slowly                   Problem: Patient Education: Go to Patient Education Activity  Goal: Patient/Family Education  Outcome: Progressing Towards Goal     Problem: Pressure Injury - Risk of  Goal: *Prevention of pressure injury  Description: Document Chris Scale and appropriate interventions in the flowsheet.   Outcome: Progressing Towards Goal  Note: Pressure Injury Interventions:  Sensory Interventions: Assess changes in LOC    Moisture Interventions: Absorbent underpads,Apply protective barrier, creams and emollients    Activity Interventions: PT/OT evaluation    Mobility Interventions: Float heels,HOB 30 degrees or less    Nutrition Interventions: Document food/fluid/supplement intake    Friction and Shear Interventions: Apply protective barrier, creams and emollients                Problem: Patient Education: Go to Patient Education Activity  Goal: Patient/Family Education  Outcome: Progressing Towards Goal

## 2022-05-02 NOTE — PROGRESS NOTES
Hospitalist Progress Note    Subjective:   Daily Progress Note: 5/2/2022 9:20 AM    Hospital Course: Patient is a 22-year-old female with a history of CHF, type 2 diabetes, hypertension, CKD that presented to the emergency room on 5/1/2022 for back pain for approximately 2 weeks. Patient had a CT of the lumbar spine done on 4/27/2022 which revealed an L2 compression fracture that appeared acute. Vital signs and laboratory data unremarkable in the ED. It was recommended admission for possible orthopedic evaluation and/or kyphoplasty      Subjective: Patient says that her back pain is about the same from yesterday however not any worse.   Denies any numbness or tingling in the fingers or toes    Current Facility-Administered Medications   Medication Dose Route Frequency    sodium chloride (NS) flush 5-40 mL  5-40 mL IntraVENous Q8H    sodium chloride (NS) flush 5-40 mL  5-40 mL IntraVENous PRN    acetaminophen (TYLENOL) tablet 650 mg  650 mg Oral Q6H PRN    Or    acetaminophen (TYLENOL) suppository 650 mg  650 mg Rectal Q6H PRN    polyethylene glycol (MIRALAX) packet 17 g  17 g Oral DAILY PRN    bisacodyL (DULCOLAX) tablet 5 mg  5 mg Oral DAILY PRN    ondansetron (ZOFRAN ODT) tablet 4 mg  4 mg Oral Q6H PRN    Or    ondansetron (ZOFRAN) injection 4 mg  4 mg IntraVENous Q6H PRN    famotidine (PEPCID) tablet 20 mg  20 mg Oral DAILY    glucose chewable tablet 16 g  4 Tablet Oral PRN    glucagon (GLUCAGEN) injection 1 mg  1 mg IntraMUSCular PRN    insulin lispro (HUMALOG) injection   SubCUTAneous AC&HS    DULoxetine (CYMBALTA) capsule 30 mg  30 mg Oral DAILY    cetirizine (ZYRTEC) tablet 10 mg  10 mg Oral DAILY    HYDROcodone-acetaminophen (NORCO) 5-325 mg per tablet 1 Tablet  1 Tablet Oral Q4H PRN    alogliptin (NESINA) tablet 6.25 mg  6.25 mg Oral DAILY    lisinopriL (PRINIVIL, ZESTRIL) tablet 20 mg  20 mg Oral DAILY    verapamil ER (VERELAN) capsule 240 mg  240 mg Oral DAILY WITH BREAKFAST    allopurinoL (ZYLOPRIM) tablet 300 mg  300 mg Oral DAILY    aspirin delayed-release tablet 81 mg  81 mg Oral DAILY    ferrous sulfate tablet 325 mg  325 mg Oral ACB    hydrALAZINE (APRESOLINE) tablet 50 mg  50 mg Oral BID    melatonin tablet 3 mg  3 mg Oral QHS    cholecalciferol (VITAMIN D3) (1000 Units /25 mcg) tablet 2,000 Units  2,000 Units Oral DAILY    furosemide (LASIX) tablet 40 mg  40 mg Oral DAILY        Review of Systems  Constitutional: No fevers, No chills, No sweats, +fatigue, ++ Weakness  Eyes: No redness  Ears, nose, mouth, throat, and face: No nasal congestion, No sore throat, No voice change  Respiratory: No Shortness of Breath, No cough, No wheezing  Cardiovascular: No chest pain, No palpitations, No extremity edema  Gastrointestinal: No nausea, No vomiting, No diarrhea, No abdominal pain  Genitourinary: No frequency, No dysuria, No hematuria  Integument/breast: No skin lesion(s)   Neurological: No Confusion, No headaches, No dizziness      Objective:     Visit Vitals  /84 (BP 1 Location: Right upper arm)   Pulse 78   Temp 97 °F (36.1 °C)   Resp 18   Ht 5' 2\" (1.575 m)   Wt 69.9 kg (154 lb)   SpO2 92%   BMI 28.17 kg/m²      O2 Device: None (Room air)    Temp (24hrs), Av.8 °F (36.6 °C), Min:97 °F (36.1 °C), Max:98.1 °F (36.7 °C)      No intake/output data recorded. No intake/output data recorded. PHYSICAL EXAM:  Constitutional: No acute distress  Skin: Extremities and face reveal no rashes. HEENT: Sclerae anicteric. Extra-occular muscles are intact. Cardiovascular: Regular rate and rhythm. Respiratory:  Clear breath sounds bilaterally with no wheezes, rales, or rhonchi. GI: Abdomen nondistended, soft, and nontender. Normal active bowel sounds. Musculoskeletal: No pitting edema of the lower legs. Able to move all ext  Neurological:  Patient is alert and oriented.  Cranial nerves II-XII grossly intact  Psychiatric: Mood appears appropriate       Data Review    Recent Results (from the past 24 hour(s))   CBC WITH AUTOMATED DIFF    Collection Time: 05/01/22  3:42 PM   Result Value Ref Range    WBC 9.5 3.6 - 11.0 K/uL    RBC 3.99 3.80 - 5.20 M/uL    HGB 11.7 11.5 - 16.0 g/dL    HCT 37.4 35.0 - 47.0 %    MCV 93.7 80.0 - 99.0 FL    MCH 29.3 26.0 - 34.0 PG    MCHC 31.3 30.0 - 36.5 g/dL    RDW 13.8 11.5 - 14.5 %    PLATELET 484 058 - 952 K/uL    MPV 9.3 8.9 - 12.9 FL    NRBC 0.0 0.0  WBC    ABSOLUTE NRBC 0.00 0.00 - 0.01 K/uL    NEUTROPHILS 71 32 - 75 %    LYMPHOCYTES 16 12 - 49 %    MONOCYTES 9 5 - 13 %    EOSINOPHILS 2 0 - 7 %    BASOPHILS 1 0 - 1 %    IMMATURE GRANULOCYTES 1 (H) 0 - 0.5 %    ABS. NEUTROPHILS 6.9 1.8 - 8.0 K/UL    ABS. LYMPHOCYTES 1.5 0.8 - 3.5 K/UL    ABS. MONOCYTES 0.8 0.0 - 1.0 K/UL    ABS. EOSINOPHILS 0.1 0.0 - 0.4 K/UL    ABS. BASOPHILS 0.1 0.0 - 0.1 K/UL    ABS. IMM. GRANS. 0.1 (H) 0.00 - 0.04 K/UL    DF AUTOMATED     METABOLIC PANEL, COMPREHENSIVE    Collection Time: 05/01/22  3:42 PM   Result Value Ref Range    Sodium 137 136 - 145 mmol/L    Potassium 3.8 3.5 - 5.1 mmol/L    Chloride 104 97 - 108 mmol/L    CO2 28 21 - 32 mmol/L    Anion gap 5 5 - 15 mmol/L    Glucose 167 (H) 65 - 100 mg/dL    BUN 37 (H) 6 - 20 mg/dL    Creatinine 2.02 (H) 0.55 - 1.02 mg/dL    BUN/Creatinine ratio 18 12 - 20      GFR est AA 28 (L) >60 ml/min/1.73m2    GFR est non-AA 23 (L) >60 ml/min/1.73m2    Calcium 9.0 8.5 - 10.1 mg/dL    Bilirubin, total 0.3 0.2 - 1.0 mg/dL    AST (SGOT) 10 (L) 15 - 37 U/L    ALT (SGPT) 10 (L) 12 - 78 U/L    Alk.  phosphatase 71 45 - 117 U/L    Protein, total 5.9 (L) 6.4 - 8.2 g/dL    Albumin 2.9 (L) 3.5 - 5.0 g/dL    Globulin 3.0 2.0 - 4.0 g/dL    A-G Ratio 1.0 (L) 1.1 - 2.2     TYPE & SCREEN    Collection Time: 05/01/22  3:42 PM   Result Value Ref Range    Crossmatch Expiration 05/04/2022,2359     ABO/Rh(D) A Positive     Antibody screen Negative    EKG, 12 LEAD, INITIAL    Collection Time: 05/01/22  3:59 PM   Result Value Ref Range    Ventricular Rate 68 BPM    Atrial Rate 68 BPM    P-R Interval 136 ms    QRS Duration 112 ms    Q-T Interval 466 ms    QTC Calculation (Bezet) 495 ms    Calculated P Axis 53 degrees    Calculated R Axis -32 degrees    Calculated T Axis -8 degrees    Diagnosis       Sinus rhythm with Premature supraventricular complexes and with occasional   Premature ventricular complexes  Baseline artifact  Left axis deviation  Incomplete right bundle branch block  Minimal voltage criteria for LVH, may be normal variant  Nonspecific ST and T wave abnormality  Abnormal ECG  No previous ECGs available  Confirmed by Quique Barragan ([id number]) on 5/2/2022 8:35:23 AM     GLUCOSE, POC    Collection Time: 05/01/22  5:15 PM   Result Value Ref Range    Glucose (POC) 134 (H) 65 - 117 mg/dL    Performed by [facility]    GLUCOSE, POC    Collection Time: 05/01/22  9:12 PM   Result Value Ref Range    Glucose (POC) 167 (H) 65 - 117 mg/dL    Performed by [facility]    METABOLIC PANEL, BASIC    Collection Time: 05/02/22  7:07 AM   Result Value Ref Range    Sodium 140 136 - 145 mmol/L    Potassium 4.0 3.5 - 5.1 mmol/L    Chloride 105 97 - 108 mmol/L    CO2 29 21 - 32 mmol/L    Anion gap 6 5 - 15 mmol/L    Glucose 112 (H) 65 - 100 mg/dL    BUN 30 (H) 6 - 20 mg/dL    Creatinine 1.55 (H) 0.55 - 1.02 mg/dL    BUN/Creatinine ratio 19 12 - 20      GFR est AA 38 (L) >60 ml/min/1.73m2    GFR est non-AA 31 (L) >60 ml/min/1.73m2    Calcium 8.7 8.5 - 10.1 mg/dL   CBC W/O DIFF    Collection Time: 05/02/22  7:07 AM   Result Value Ref Range    WBC 7.4 3.6 - 11.0 K/uL    RBC 3.90 3.80 - 5.20 M/uL    HGB 11.6 11.5 - 16.0 g/dL    HCT 36.4 35.0 - 47.0 %    MCV 93.3 80.0 - 99.0 FL    MCH 29.7 26.0 - 34.0 PG    MCHC 31.9 30.0 - 36.5 g/dL    RDW 13.9 11.5 - 14.5 %    PLATELET 013 800 - 223 K/uL    MPV 9.6 8.9 - 12.9 FL    NRBC 0.0 0.0  WBC    ABSOLUTE NRBC 0.00 0.00 - 0.01 K/uL   GLUCOSE, POC    Collection Time: 05/02/22  8:58 AM   Result Value Ref Range    Glucose (POC) 128 (H) 65 - 117 mg/dL    Performed by Domenica Ayon        Assessment:   1. L2 compression fracture  2. Hypertension  3. CHF  4. Type 2 diabetes  5. CKD    Plan:    1.  CT of the cervical spine on 4/27/2022 shows signs concerning for L2 compression fracture. Unable to locate these results. Orthopedics consulted. Patient on Norco and Cymbalta. Patient may require MRI. Wait for orthopedics consultation. PT and OT postop  2. Blood pressure is stable. Continue to monitor per unit protocol. Patient on Lasix, hydralazine, lisinopril, verapamil. 3.  Patient is on aspirin and diuretic. No signs of fluid overload  4. Blood glucose levels are stable. On insulin sliding scale. On alogliptin. 5.  Renal function is stable. Hold nephrotoxic medications. Stable. Dispo: 4 to 48 hours. Barriers include orthopedic evaluation, possible kyphoplasty, PT and OT evaluation. Suspect patient will require home with home health    CODE STATUS full     DVT prophylaxis: SCDs  Ulcer prophylaxis: [address]ova 70 discussed with: Patient/Family, Nurse and     Total time spent with patient: 34 minutes.

## 2022-05-02 NOTE — PROGRESS NOTES
OT evaluation order received and acknowledged. Per medical chart, CT of spine on 4/27/2022 shows signs concerning for L2 compression fracture, currently awaiting orthopedic evaluation and/or possible kyphoplasty. OT to hold at this time. Will continue to follow and re-attempt OT eval at a later time as medically appropriate. Thank you.

## 2022-05-02 NOTE — PROGRESS NOTES
Orders received for PT/OT eval, chart reviewed,patient await ortho consult for L2 compression Fx. We will wait until ortho orders for further intervention.

## 2022-05-02 NOTE — PROGRESS NOTES
Reason for Admission:  Compression Fx                     RUR Score: N/A                    Plan for utilizing home health: Open if recommended         PCP: First and Last name:  Vargas Saldivar MD     Name of Practice:    Are you a current patient: Yes/No:    Approximate date of last visit: 4/29/2022   Can you participate in a virtual visit with your PCP:                     Current Advanced Directive/Advance Care Plan: Full Code      Healthcare Decision Maker:   Click here to complete [address] including selection of the Healthcare Decision Maker Relationship (ie \"Primary\")                             Transition of Care Plan:                    Patient discussed discharge planning with patient and 3 children at bedside. Patient desires to return home. She is open to [facility] services if recommended. Patient would like  [facility] if recommended. Granddaughter works at facility. Patient has lots of family support near her. Patient lives in a double wide mobile home with two steps to enter. Patient does not exit home unless accompanied by family to assist with maneuvering the steps. patient is independent with ADL/IADL care. Family assist with medication administration and transport to appt. Pharmacy [address] [place] [address]  [phone]. Family to transport at discharge.

## 2022-05-03 ENCOUNTER — APPOINTMENT (OUTPATIENT)
Dept: MRI IMAGING | Age: 87
DRG: 478 | End: 2022-05-03
Attending: PHYSICIAN ASSISTANT
Payer: MEDICARE

## 2022-05-03 LAB
ANION GAP SERPL CALC-SCNC: 5 MMOL/L (ref 5–15)
BASOPHILS # BLD: 0 K/UL (ref 0–0.1)
BASOPHILS NFR BLD: 1 % (ref 0–1)
BUN SERPL-MCNC: 30 MG/DL (ref 6–20)
BUN/CREAT SERPL: 19 (ref 12–20)
CA-I BLD-MCNC: 8.9 MG/DL (ref 8.5–10.1)
CHLORIDE SERPL-SCNC: 105 MMOL/L (ref 97–108)
CO2 SERPL-SCNC: 30 MMOL/L (ref 21–32)
CREAT SERPL-MCNC: 1.61 MG/DL (ref 0.55–1.02)
DIFFERENTIAL METHOD BLD: ABNORMAL
EOSINOPHIL # BLD: 0.2 K/UL (ref 0–0.4)
EOSINOPHIL NFR BLD: 3 % (ref 0–7)
ERYTHROCYTE [DISTWIDTH] IN BLOOD BY AUTOMATED COUNT: 13.9 % (ref 11.5–14.5)
GLUCOSE BLD STRIP.AUTO-MCNC: 106 MG/DL (ref 65–117)
GLUCOSE BLD STRIP.AUTO-MCNC: 177 MG/DL (ref 65–117)
GLUCOSE BLD STRIP.AUTO-MCNC: 184 MG/DL (ref 65–117)
GLUCOSE BLD STRIP.AUTO-MCNC: 231 MG/DL (ref 65–117)
GLUCOSE SERPL-MCNC: 174 MG/DL (ref 65–100)
HCT VFR BLD AUTO: 35.8 % (ref 35–47)
HGB BLD-MCNC: 11.7 G/DL (ref 11.5–16)
IMM GRANULOCYTES # BLD AUTO: 0 K/UL (ref 0–0.04)
IMM GRANULOCYTES NFR BLD AUTO: 1 % (ref 0–0.5)
INR PPP: 1 (ref 0.9–1.1)
LYMPHOCYTES # BLD: 1.4 K/UL (ref 0.8–3.5)
LYMPHOCYTES NFR BLD: 17 % (ref 12–49)
MCH RBC QN AUTO: 29.9 PG (ref 26–34)
MCHC RBC AUTO-ENTMCNC: 32.7 G/DL (ref 30–36.5)
MCV RBC AUTO: 91.6 FL (ref 80–99)
MONOCYTES # BLD: 0.7 K/UL (ref 0–1)
MONOCYTES NFR BLD: 9 % (ref 5–13)
NEUTS SEG # BLD: 5.8 K/UL (ref 1.8–8)
NEUTS SEG NFR BLD: 69 % (ref 32–75)
NRBC # BLD: 0 K/UL (ref 0–0.01)
NRBC BLD-RTO: 0 PER 100 WBC
PERFORMED BY, TECHID: ABNORMAL
PERFORMED BY, TECHID: NORMAL
PLATELET # BLD AUTO: 280 K/UL (ref 150–400)
PMV BLD AUTO: 9.4 FL (ref 8.9–12.9)
POTASSIUM SERPL-SCNC: 4 MMOL/L (ref 3.5–5.1)
PROTHROMBIN TIME: 13 SEC (ref 11.9–14.6)
RBC # BLD AUTO: 3.91 M/UL (ref 3.8–5.2)
SODIUM SERPL-SCNC: 140 MMOL/L (ref 136–145)
WBC # BLD AUTO: 8.3 K/UL (ref 3.6–11)

## 2022-05-03 PROCEDURE — 36415 COLL VENOUS BLD VENIPUNCTURE: CPT

## 2022-05-03 PROCEDURE — 80048 BASIC METABOLIC PNL TOTAL CA: CPT

## 2022-05-03 PROCEDURE — 74011636637 HC RX REV CODE- 636/637: Performed by: NURSE PRACTITIONER

## 2022-05-03 PROCEDURE — 74011250637 HC RX REV CODE- 250/637: Performed by: NURSE PRACTITIONER

## 2022-05-03 PROCEDURE — 74011000250 HC RX REV CODE- 250: Performed by: NURSE PRACTITIONER

## 2022-05-03 PROCEDURE — 82962 GLUCOSE BLOOD TEST: CPT

## 2022-05-03 PROCEDURE — 65270000029 HC RM PRIVATE

## 2022-05-03 PROCEDURE — 85025 COMPLETE CBC W/AUTO DIFF WBC: CPT

## 2022-05-03 PROCEDURE — 72148 MRI LUMBAR SPINE W/O DYE: CPT

## 2022-05-03 PROCEDURE — 85610 PROTHROMBIN TIME: CPT

## 2022-05-03 RX ADMIN — ALLOPURINOL 300 MG: 300 TABLET ORAL at 09:20

## 2022-05-03 RX ADMIN — ACETAMINOPHEN 650 MG: 325 TABLET ORAL at 20:10

## 2022-05-03 RX ADMIN — FAMOTIDINE 20 MG: 20 TABLET ORAL at 09:20

## 2022-05-03 RX ADMIN — Medication 2000 UNITS: at 09:20

## 2022-05-03 RX ADMIN — HYDROCODONE BITARTRATE AND ACETAMINOPHEN 1 TABLET: 5; 325 TABLET ORAL at 05:32

## 2022-05-03 RX ADMIN — FUROSEMIDE 40 MG: 40 TABLET ORAL at 09:20

## 2022-05-03 RX ADMIN — INSULIN LISPRO 4 UNITS: 100 INJECTION, SOLUTION INTRAVENOUS; SUBCUTANEOUS at 09:19

## 2022-05-03 RX ADMIN — SODIUM CHLORIDE, PRESERVATIVE FREE 10 ML: 5 INJECTION INTRAVENOUS at 05:32

## 2022-05-03 RX ADMIN — CETIRIZINE HYDROCHLORIDE 10 MG: 10 TABLET, FILM COATED ORAL at 09:20

## 2022-05-03 RX ADMIN — SODIUM CHLORIDE, PRESERVATIVE FREE 10 ML: 5 INJECTION INTRAVENOUS at 21:00

## 2022-05-03 RX ADMIN — DULOXETINE 30 MG: 30 CAPSULE, DELAYED RELEASE ORAL at 09:21

## 2022-05-03 RX ADMIN — ALOGLIPTIN 6.25 MG: 6.25 TABLET, FILM COATED ORAL at 09:20

## 2022-05-03 RX ADMIN — FERROUS SULFATE TAB 325 MG (65 MG ELEMENTAL FE) 325 MG: 325 (65 FE) TAB at 09:24

## 2022-05-03 RX ADMIN — INSULIN LISPRO 3 UNITS: 100 INJECTION, SOLUTION INTRAVENOUS; SUBCUTANEOUS at 17:43

## 2022-05-03 RX ADMIN — HYDRALAZINE HYDROCHLORIDE 50 MG: 50 TABLET, FILM COATED ORAL at 20:10

## 2022-05-03 RX ADMIN — HYDRALAZINE HYDROCHLORIDE 50 MG: 50 TABLET, FILM COATED ORAL at 09:21

## 2022-05-03 RX ADMIN — MELATONIN TAB 3 MG 3 MG: 3 TAB at 20:10

## 2022-05-03 RX ADMIN — VERAPAMIL HYDROCHLORIDE 240 MG: 120 CAPSULE, DELAYED RELEASE PELLETS ORAL at 09:20

## 2022-05-03 RX ADMIN — LISINOPRIL 20 MG: 10 TABLET ORAL at 09:20

## 2022-05-03 NOTE — PROGRESS NOTES
Physician Progress Note      Fredrick Resendiz  CSN #:                  [id number]  :                       1932  ADMIT DATE:       2022 1:51 PM  [address] [place] DATE:  RESPONDING  PROVIDER #:        Eden FERREIRA PA-C        QUERY TEXT:    Stage of Chronic Kidney Disease: Please provide further specificity, if known. Clinical indicators include: ckd, bun, creatinine, bun/creatinine, gfr, chronic kidney failure  Options provided:  -- Chronic kidney disease stage 1  -- Chronic kidney disease stage 2  -- Chronic kidney disease stage 3  -- Chronic kidney disease stage 3a  -- Chronic kidney disease stage 3b  -- Chronic kidney disease stage 4  -- Chronic kidney disease stage 5  -- Chronic kidney disease stage 5, requiring dialysis  -- End stage renal disease  -- Other - I will add my own diagnosis  -- Disagree - Not applicable / Not valid  -- Disagree - Clinically Unable to determine / Unknown        PROVIDER RESPONSE TEXT:    The patient has chronic kidney disease stage 1.       Electronically signed by:  Ranjit Mendez PA-C 5/3/2022 1:55 PM

## 2022-05-03 NOTE — CONSULTS
55-year-old female presents to the emergency department for back pain. Back pain has been persistent for 2 weeks. She had recently discovered L2 fracture on 4/27/2022. She does not remember a particular fall or injury. CT of the lumbar spine was reviewed. It appears that there is a acute on chronic fracture of L2. On physical examination she is point tender at the level of L2. Recommendation:  55-year-old female with worsening pain at L2 compression fracture deformity. Patient is scheduled to get MRI noncontrast of the L-spine to confirm L2 fracture acuity as well as any other occult fractures. Should be a good candidate for kyphoplasty given severe lumbar pain which is lifestyle limiting and not relieved by conservative measures. I would request anesthesia support given her history of heart failure, and other comorbidities.

## 2022-05-03 NOTE — PROGRESS NOTES
Hospitalist Progress Note    Subjective:   Daily Progress Note: 5/3/2022 9:20 AM    Hospital Course: Patient is a 77-year-old female with a history of CHF, type 2 diabetes, hypertension, CKD that presented to the emergency room on 5/1/2022 for back pain for approximately 2 weeks. Patient had a CT of the lumbar spine done on 4/27/2022 which revealed an L2 compression fracture that appeared acute. Vital signs and laboratory data unremarkable in the ED. It was recommended admission for possible IR evaluation and/or kyphoplasty. MRI of lumbar spine pending      Subjective: Per MRI department MRI machine was down yesterday. Therefore MRI to be completed today.  Patient says she feels the same    Current Facility-Administered Medications   Medication Dose Route Frequency    sodium chloride (NS) flush 5-40 mL  5-40 mL IntraVENous Q8H    sodium chloride (NS) flush 5-40 mL  5-40 mL IntraVENous PRN    acetaminophen (TYLENOL) tablet 650 mg  650 mg Oral Q6H PRN    Or    acetaminophen (TYLENOL) suppository 650 mg  650 mg Rectal Q6H PRN    polyethylene glycol (MIRALAX) packet 17 g  17 g Oral DAILY PRN    bisacodyL (DULCOLAX) tablet 5 mg  5 mg Oral DAILY PRN    ondansetron (ZOFRAN ODT) tablet 4 mg  4 mg Oral Q6H PRN    Or    ondansetron (ZOFRAN) injection 4 mg  4 mg IntraVENous Q6H PRN    famotidine (PEPCID) tablet 20 mg  20 mg Oral DAILY    glucose chewable tablet 16 g  4 Tablet Oral PRN    glucagon (GLUCAGEN) injection 1 mg  1 mg IntraMUSCular PRN    insulin lispro (HUMALOG) injection   SubCUTAneous AC&HS    DULoxetine (CYMBALTA) capsule 30 mg  30 mg Oral DAILY    cetirizine (ZYRTEC) tablet 10 mg  10 mg Oral DAILY    HYDROcodone-acetaminophen (NORCO) 5-325 mg per tablet 1 Tablet  1 Tablet Oral Q4H PRN    alogliptin (NESINA) tablet 6.25 mg  6.25 mg Oral DAILY    lisinopriL (PRINIVIL, ZESTRIL) tablet 20 mg  20 mg Oral DAILY    verapamil ER (VERELAN) capsule 240 mg  240 mg Oral DAILY WITH BREAKFAST    allopurinoL (ZYLOPRIM) tablet 300 mg  300 mg Oral DAILY    [Held by provider] aspirin delayed-release tablet 81 mg  81 mg Oral DAILY    ferrous sulfate tablet 325 mg  325 mg Oral ACB    hydrALAZINE (APRESOLINE) tablet 50 mg  50 mg Oral BID    melatonin tablet 3 mg  3 mg Oral QHS    cholecalciferol (VITAMIN D3) (1000 Units /25 mcg) tablet 2,000 Units  2,000 Units Oral DAILY    furosemide (LASIX) tablet 40 mg  40 mg Oral DAILY        Review of Systems  Constitutional: No fevers, No chills, No sweats, +fatigue, ++ Weakness  Eyes: No redness  Ears, nose, mouth, throat, and face: No nasal congestion, No sore throat, No voice change  Respiratory: No Shortness of Breath, No cough, No wheezing  Cardiovascular: No chest pain, No palpitations, No extremity edema  Gastrointestinal: No nausea, No vomiting, No diarrhea, No abdominal pain  Genitourinary: No frequency, No dysuria, No hematuria  Integument/breast: No skin lesion(s)   Neurological: No Confusion, No headaches, No dizziness      Objective:     Visit Vitals  BP (!) 130/59 (BP 1 Location: Right upper arm, BP Patient Position: At rest)   Pulse 79   Temp 98 °F (36.7 °C)   Resp 16   Ht 5' 2\" (1.575 m)   Wt 69.9 kg (154 lb)   SpO2 93%   BMI 28.17 kg/m²      O2 Device: None (Room air)    Temp (24hrs), Av.9 °F (36.6 °C), Min:97 °F (36.1 °C), Max:98.4 °F (36.9 °C)      No intake/output data recorded.  1901 -  0700  In: -   Out: 800 [Urine:800]    PHYSICAL EXAM:  Constitutional: No acute distress  Skin: Extremities and face reveal no rashes. HEENT: Sclerae anicteric. Extra-occular muscles are intact. Cardiovascular: RRR  Respiratory:  Clear breath sounds bilaterally with no wheezes, rales, or rhonchi. GI: Abdomen nondistended, soft, and nontender. Normal active bowel sounds. Musculoskeletal: No pitting edema of the lower legs. Able to move all ext  Neurological:  Patient is alert and oriented.  Cranial nerves II-XII grossly intact  Psychiatric: Mood appears appropriate       Data Review    Recent Results (from the past 24 hour(s))   GLUCOSE, POC    Collection Time: 05/02/22  8:58 AM   Result Value Ref Range    Glucose (POC) 128 (H) 65 - 117 mg/dL    Performed by Brooke Calix, POC    Collection Time: 05/02/22 10:59 AM   Result Value Ref Range    Glucose (POC) 118 (H) 65 - 117 mg/dL    Performed by Brooke Calix, POC    Collection Time: 05/02/22  3:32 PM   Result Value Ref Range    Glucose (POC) 194 (H) 65 - 117 mg/dL    Performed by Brooke Calix, POC    Collection Time: 05/02/22  5:29 PM   Result Value Ref Range    Glucose (POC) 145 (H) 65 - 117 mg/dL    Performed by Jeannette Maxwell    GLUCOSE, POC    Collection Time: 05/02/22  8:17 PM   Result Value Ref Range    Glucose (POC) 165 (H) 65 - 117 mg/dL    Performed by Ruslan EASTON        Assessment:   1. L2 compression fracture  2. Hypertension  3. CHF  4. Type 2 diabetes  5. CKD    Plan:    1.  CT of the cervical spine on 4/27/2022 shows signs concerning for L2 compression fracture. Unable to locate these results as done at an outside facility. IR consulted. Patient on Norco and Cymbalta. MRI pending. Possibly kyphoplasty pending MRI results. PT and OT postop  2. Blood pressure is stable. Continue to monitor per unit protocol. Patient on Lasix, hydralazine, lisinopril, verapamil. 3.  Patient is on aspirin and diuretic. No signs of fluid overload  4. Blood glucose levels are stable. On insulin sliding scale. On alogliptin. 5.  Renal function is stable. Hold nephrotoxic medications. Stable. Dispo: 24-48 hours. Barriers include IR evaluation, possible kyphoplasty, PT and OT evaluation. Suspect patient will require home with home health or IRF    CODE STATUS full     DVT prophylaxis: SCDs  Ulcer prophylaxis: Parmova 70 discussed with: Patient/Family, Nurse and     Total time spent with patient: 33 minutes.

## 2022-05-03 NOTE — PROGRESS NOTES
OT evaluation order received and acknowledged. Per medical chart, pt underwent MRI spine today and is pending possible kyphoplasty with IR. OT to to hold at this time, continue to follow and attempt OT eval at a later time as medically appropriate. Thank you.

## 2022-05-04 ENCOUNTER — APPOINTMENT (OUTPATIENT)
Dept: INTERVENTIONAL RADIOLOGY/VASCULAR | Age: 87
DRG: 478 | End: 2022-05-04
Attending: PHYSICIAN ASSISTANT
Payer: MEDICARE

## 2022-05-04 ENCOUNTER — ANESTHESIA (OUTPATIENT)
Dept: INTERVENTIONAL RADIOLOGY/VASCULAR | Age: 87
DRG: 478 | End: 2022-05-04
Payer: MEDICARE

## 2022-05-04 ENCOUNTER — ANESTHESIA EVENT (OUTPATIENT)
Dept: INTERVENTIONAL RADIOLOGY/VASCULAR | Age: 87
DRG: 478 | End: 2022-05-04
Payer: MEDICARE

## 2022-05-04 LAB
GLUCOSE BLD STRIP.AUTO-MCNC: 164 MG/DL (ref 65–117)
GLUCOSE BLD STRIP.AUTO-MCNC: 171 MG/DL (ref 65–117)
GLUCOSE BLD STRIP.AUTO-MCNC: 191 MG/DL (ref 65–117)
GLUCOSE BLD STRIP.AUTO-MCNC: 192 MG/DL (ref 65–117)
GLUCOSE BLD STRIP.AUTO-MCNC: 399 MG/DL (ref 65–117)
PERFORMED BY, TECHID: ABNORMAL

## 2022-05-04 PROCEDURE — 74011000258 HC RX REV CODE- 258: Performed by: NURSE ANESTHETIST, CERTIFIED REGISTERED

## 2022-05-04 PROCEDURE — 77030041313 HC TY KYPH FRST FX MEDT -K1

## 2022-05-04 PROCEDURE — 74011250636 HC RX REV CODE- 250/636: Performed by: NURSE ANESTHETIST, CERTIFIED REGISTERED

## 2022-05-04 PROCEDURE — 0QB03ZX EXCISION OF LUMBAR VERTEBRA, PERCUTANEOUS APPROACH, DIAGNOSTIC: ICD-10-PCS | Performed by: RADIOLOGY

## 2022-05-04 PROCEDURE — 0QS03ZZ REPOSITION LUMBAR VERTEBRA, PERCUTANEOUS APPROACH: ICD-10-PCS | Performed by: RADIOLOGY

## 2022-05-04 PROCEDURE — 74011250637 HC RX REV CODE- 250/637: Performed by: NURSE PRACTITIONER

## 2022-05-04 PROCEDURE — 88311 DECALCIFY TISSUE: CPT

## 2022-05-04 PROCEDURE — 74011636637 HC RX REV CODE- 636/637: Performed by: NURSE PRACTITIONER

## 2022-05-04 PROCEDURE — C1713 ANCHOR/SCREW BN/BN,TIS/BN: HCPCS

## 2022-05-04 PROCEDURE — 88307 TISSUE EXAM BY PATHOLOGIST: CPT

## 2022-05-04 PROCEDURE — 74011000250 HC RX REV CODE- 250: Performed by: NURSE PRACTITIONER

## 2022-05-04 PROCEDURE — 0QU03JZ SUPPLEMENT LUMBAR VERTEBRA WITH SYNTHETIC SUBSTITUTE, PERCUTANEOUS APPROACH: ICD-10-PCS | Performed by: RADIOLOGY

## 2022-05-04 PROCEDURE — 77030021783 IR KYPHOPLASTY LUMBAR

## 2022-05-04 PROCEDURE — 74011000250 HC RX REV CODE- 250: Performed by: NURSE ANESTHETIST, CERTIFIED REGISTERED

## 2022-05-04 PROCEDURE — 65270000029 HC RM PRIVATE

## 2022-05-04 PROCEDURE — 77030003451 HC NDL BIOP BN MEDT -C

## 2022-05-04 PROCEDURE — 74011250636 HC RX REV CODE- 250/636: Performed by: ANESTHESIOLOGY

## 2022-05-04 PROCEDURE — 82962 GLUCOSE BLOOD TEST: CPT

## 2022-05-04 RX ORDER — DEXAMETHASONE SODIUM PHOSPHATE 4 MG/ML
INJECTION, SOLUTION INTRA-ARTICULAR; INTRALESIONAL; INTRAMUSCULAR; INTRAVENOUS; SOFT TISSUE AS NEEDED
Status: DISCONTINUED | OUTPATIENT
Start: 2022-05-04 | End: 2022-05-04 | Stop reason: HOSPADM

## 2022-05-04 RX ORDER — PROPOFOL 10 MG/ML
INJECTION, EMULSION INTRAVENOUS
Status: DISCONTINUED | OUTPATIENT
Start: 2022-05-04 | End: 2022-05-04 | Stop reason: HOSPADM

## 2022-05-04 RX ORDER — SODIUM CHLORIDE 9 MG/ML
50 INJECTION, SOLUTION INTRAVENOUS CONTINUOUS
Status: DISCONTINUED | OUTPATIENT
Start: 2022-05-04 | End: 2022-05-06 | Stop reason: HOSPADM

## 2022-05-04 RX ORDER — ONDANSETRON 2 MG/ML
INJECTION INTRAMUSCULAR; INTRAVENOUS AS NEEDED
Status: DISCONTINUED | OUTPATIENT
Start: 2022-05-04 | End: 2022-05-04 | Stop reason: HOSPADM

## 2022-05-04 RX ORDER — DEXMEDETOMIDINE HYDROCHLORIDE 100 UG/ML
INJECTION, SOLUTION INTRAVENOUS AS NEEDED
Status: DISCONTINUED | OUTPATIENT
Start: 2022-05-04 | End: 2022-05-04 | Stop reason: HOSPADM

## 2022-05-04 RX ORDER — NORETHINDRONE AND ETHINYL ESTRADIOL 0.5-0.035
KIT ORAL AS NEEDED
Status: DISCONTINUED | OUTPATIENT
Start: 2022-05-04 | End: 2022-05-04 | Stop reason: HOSPADM

## 2022-05-04 RX ORDER — FENTANYL CITRATE 50 UG/ML
INJECTION, SOLUTION INTRAMUSCULAR; INTRAVENOUS AS NEEDED
Status: DISCONTINUED | OUTPATIENT
Start: 2022-05-04 | End: 2022-05-04 | Stop reason: HOSPADM

## 2022-05-04 RX ORDER — SODIUM CHLORIDE 9 MG/ML
INJECTION, SOLUTION INTRAVENOUS
Status: DISCONTINUED | OUTPATIENT
Start: 2022-05-04 | End: 2022-05-04 | Stop reason: HOSPADM

## 2022-05-04 RX ADMIN — FENTANYL CITRATE 100 MCG: 50 INJECTION, SOLUTION INTRAMUSCULAR; INTRAVENOUS at 13:30

## 2022-05-04 RX ADMIN — EPHEDRINE SULFATE 15 MG: 50 INJECTION INTRAVENOUS at 14:05

## 2022-05-04 RX ADMIN — ACETAMINOPHEN 650 MG: 325 TABLET ORAL at 21:05

## 2022-05-04 RX ADMIN — MELATONIN TAB 3 MG 3 MG: 3 TAB at 21:05

## 2022-05-04 RX ADMIN — LISINOPRIL 20 MG: 10 TABLET ORAL at 09:16

## 2022-05-04 RX ADMIN — SODIUM CHLORIDE 50 ML/HR: 9 INJECTION, SOLUTION INTRAVENOUS at 11:10

## 2022-05-04 RX ADMIN — INSULIN LISPRO 8 UNITS: 100 INJECTION, SOLUTION INTRAVENOUS; SUBCUTANEOUS at 21:05

## 2022-05-04 RX ADMIN — EPHEDRINE SULFATE 20 MG: 50 INJECTION INTRAVENOUS at 14:20

## 2022-05-04 RX ADMIN — SODIUM CHLORIDE: 9 INJECTION, SOLUTION INTRAVENOUS at 12:58

## 2022-05-04 RX ADMIN — VERAPAMIL HYDROCHLORIDE 240 MG: 120 CAPSULE, DELAYED RELEASE PELLETS ORAL at 09:16

## 2022-05-04 RX ADMIN — DEXMEDETOMIDINE HYDROCHLORIDE 20 MCG: 100 INJECTION, SOLUTION INTRAVENOUS at 13:30

## 2022-05-04 RX ADMIN — ALOGLIPTIN 6.25 MG: 6.25 TABLET, FILM COATED ORAL at 09:16

## 2022-05-04 RX ADMIN — HYDRALAZINE HYDROCHLORIDE 50 MG: 50 TABLET, FILM COATED ORAL at 21:05

## 2022-05-04 RX ADMIN — INSULIN LISPRO 3 UNITS: 100 INJECTION, SOLUTION INTRAVENOUS; SUBCUTANEOUS at 16:45

## 2022-05-04 RX ADMIN — EPHEDRINE SULFATE 15 MG: 50 INJECTION INTRAVENOUS at 14:12

## 2022-05-04 RX ADMIN — ONDANSETRON 4 MG: 2 INJECTION INTRAMUSCULAR; INTRAVENOUS at 13:44

## 2022-05-04 RX ADMIN — FERROUS SULFATE TAB 325 MG (65 MG ELEMENTAL FE) 325 MG: 325 (65 FE) TAB at 09:16

## 2022-05-04 RX ADMIN — PROPOFOL 100 MCG/KG/MIN: 10 INJECTION, EMULSION INTRAVENOUS at 13:30

## 2022-05-04 RX ADMIN — HYDRALAZINE HYDROCHLORIDE 50 MG: 50 TABLET, FILM COATED ORAL at 09:16

## 2022-05-04 RX ADMIN — SODIUM CHLORIDE, PRESERVATIVE FREE 10 ML: 5 INJECTION INTRAVENOUS at 05:56

## 2022-05-04 RX ADMIN — CEFAZOLIN SODIUM 2 G: 1 INJECTION, POWDER, FOR SOLUTION INTRAMUSCULAR; INTRAVENOUS at 13:41

## 2022-05-04 RX ADMIN — SODIUM CHLORIDE: 9 INJECTION, SOLUTION INTRAVENOUS at 14:46

## 2022-05-04 RX ADMIN — SODIUM CHLORIDE, PRESERVATIVE FREE 10 ML: 5 INJECTION INTRAVENOUS at 21:06

## 2022-05-04 RX ADMIN — DEXAMETHASONE SODIUM PHOSPHATE 4 MG: 4 INJECTION, SOLUTION INTRA-ARTICULAR; INTRALESIONAL; INTRAMUSCULAR; INTRAVENOUS; SOFT TISSUE at 13:44

## 2022-05-04 RX ADMIN — FUROSEMIDE 40 MG: 40 TABLET ORAL at 09:16

## 2022-05-04 NOTE — PROGRESS NOTES
Hospitalist Progress Note    Subjective:   Daily Progress Note: 5/4/2022 9:20 AM    Hospital Course: Patient is a 78-year-old female with a history of CHF, type 2 diabetes, hypertension, CKD that presented to the emergency room on 5/1/2022 for back pain for approximately 2 weeks. Patient had a CT of the lumbar spine done on 4/27/2022 which revealed an L2 compression fracture that appeared acute. Vital signs and laboratory data unremarkable in the ED. It was recommended admission for possible IR evaluation and/or kyphoplasty. MRI of lumbar spine shows acute/early subacute compression fracture of L2 vertebral body, advanced degenerative facet joint disease at L4-L5 with grade 1 anterior spondylolisthesis L4 on L5, severe foraminal stenosis on the right with compression of the right L4 nerve within the neuroforamen, moderate foraminal stenosis at L2-L3 level. Patient scheduled for kyphoplasty on 5/4/2022      Subjective: Patient says that she feels about the same as yesterday. Daughter in the room on examination.   Denies any chest pain or shortness of breath    Current Facility-Administered Medications   Medication Dose Route Frequency    sodium chloride (NS) flush 5-40 mL  5-40 mL IntraVENous Q8H    sodium chloride (NS) flush 5-40 mL  5-40 mL IntraVENous PRN    acetaminophen (TYLENOL) tablet 650 mg  650 mg Oral Q6H PRN    Or    acetaminophen (TYLENOL) suppository 650 mg  650 mg Rectal Q6H PRN    polyethylene glycol (MIRALAX) packet 17 g  17 g Oral DAILY PRN    bisacodyL (DULCOLAX) tablet 5 mg  5 mg Oral DAILY PRN    ondansetron (ZOFRAN ODT) tablet 4 mg  4 mg Oral Q6H PRN    Or    ondansetron (ZOFRAN) injection 4 mg  4 mg IntraVENous Q6H PRN    famotidine (PEPCID) tablet 20 mg  20 mg Oral DAILY    glucose chewable tablet 16 g  4 Tablet Oral PRN    glucagon (GLUCAGEN) injection 1 mg  1 mg IntraMUSCular PRN    insulin lispro (HUMALOG) injection   SubCUTAneous AC&HS    DULoxetine (CYMBALTA) capsule 30 mg  30 mg Oral DAILY    cetirizine (ZYRTEC) tablet 10 mg  10 mg Oral DAILY    HYDROcodone-acetaminophen (NORCO) 5-325 mg per tablet 1 Tablet  1 Tablet Oral Q4H PRN    alogliptin (NESINA) tablet 6.25 mg  6.25 mg Oral DAILY    lisinopriL (PRINIVIL, ZESTRIL) tablet 20 mg  20 mg Oral DAILY    verapamil ER (VERELAN) capsule 240 mg  240 mg Oral DAILY WITH BREAKFAST    allopurinoL (ZYLOPRIM) tablet 300 mg  300 mg Oral DAILY    [Held by provider] aspirin delayed-release tablet 81 mg  81 mg Oral DAILY    ferrous sulfate tablet 325 mg  325 mg Oral ACB    hydrALAZINE (APRESOLINE) tablet 50 mg  50 mg Oral BID    melatonin tablet 3 mg  3 mg Oral QHS    cholecalciferol (VITAMIN D3) (1000 Units /25 mcg) tablet 2,000 Units  2,000 Units Oral DAILY    furosemide (LASIX) tablet 40 mg  40 mg Oral DAILY        Review of Systems  Constitutional: No fevers, No chills, No sweats, +fatigue, ++ Weakness  Eyes: No redness  Ears, nose, mouth, throat, and face: No nasal congestion, No sore throat, No voice change  Respiratory: No Shortness of Breath, No cough, No wheezing  Cardiovascular: No chest pain, No palpitations, No extremity edema  Gastrointestinal: No nausea, No vomiting, No diarrhea, No abdominal pain  Genitourinary: No frequency, No dysuria, No hematuria  Integument/breast: No skin lesion(s)   Neurological: No Confusion, No headaches, No dizziness      Objective:     Visit Vitals  BP (!) 136/57 (BP 1 Location: Right upper arm, BP Patient Position: At rest)   Pulse 81   Temp 99 °F (37.2 °C)   Resp 15   Ht 5' 2\" (1.575 m)   Wt 69.9 kg (154 lb)   SpO2 93%   BMI 28.17 kg/m²      O2 Device: None (Room air)    Temp (24hrs), Av.6 °F (37 °C), Min:97.5 °F (36.4 °C), Max:99 °F (37.2 °C)      No intake/output data recorded. No intake/output data recorded. PHYSICAL EXAM:  Constitutional: No acute distress  Skin: Extremities and face reveal no rashes. HEENT: Sclerae anicteric. Extra-occular muscles are intact. Cardiovascular: RRR  Respiratory:  Clear breath sounds bilaterally with no wheezes, rales, or rhonchi. GI: Abdomen nondistended, soft, and nontender. Normal active bowel sounds. Musculoskeletal: No pitting edema of the lower legs. Able to move all ext  Neurological:  Patient is alert and oriented. Cranial nerves II-XII grossly intact  Psychiatric: Mood appears appropriate       Data Review    Recent Results (from the past 24 hour(s))   GLUCOSE, POC    Collection Time: 05/03/22  9:03 AM   Result Value Ref Range    Glucose (POC) 231 (H) 65 - 117 mg/dL    Performed by Maria Elena Gerber    GLUCOSE, POC    Collection Time: 05/03/22 11:34 AM   Result Value Ref Range    Glucose (POC) 106 65 - 117 mg/dL    Performed by Maria Elena Gerber    GLUCOSE, POC    Collection Time: 05/03/22  3:32 PM   Result Value Ref Range    Glucose (POC) 177 (H) 65 - 117 mg/dL    Performed by Maria Elena Gerber    PROTHROMBIN TIME + INR    Collection Time: 05/03/22  5:31 PM   Result Value Ref Range    Prothrombin time 13.0 11.9 - 14.6 sec    INR 1.0 0.9 - 1.1     GLUCOSE, POC    Collection Time: 05/03/22  7:45 PM   Result Value Ref Range    Glucose (POC) 184 (H) 65 - 117 mg/dL    Performed by Heidi Sidhu    GLUCOSE, POC    Collection Time: 05/04/22  7:40 AM   Result Value Ref Range    Glucose (POC) 164 (H) 65 - 117 mg/dL    Performed by Vineet Mac        Assessment:   1. L2 compression fracture  2. Hypertension  3. CHF  4. Type 2 diabetes  5. CKD    Plan:    1.  CT of the cervical spine on 4/27/2022 shows signs concerning for L2 compression fracture. Unable to locate these results as done at an outside facility. IR consulted. Patient on Norco and Cymbalta. MRI reviewed. Kyphoplasty at 12 30-1 today with interventional radiology and anesthesiology. PT and OT postop  2. Blood pressure is stable. Continue to monitor per unit protocol. Patient on Lasix, hydralazine, lisinopril, verapamil. 3.  Patient is on aspirin and diuretic.   No signs of fluid overload  4. Blood glucose levels are stable. On insulin sliding scale. On alogliptin. 5.  Renal function is stable. Hold nephrotoxic medications. Stable. Dispo: 24-48 hours. Barriers kyphoplasty and postop PT OT. Suspect patient will require home with home health or IRF    CODE STATUS full     DVT prophylaxis: SCDs  Ulcer prophylaxis: Parmova 70 discussed with: Patient/Family, Nurse and     Total time spent with patient: 34 minutes.

## 2022-05-04 NOTE — ANESTHESIA POSTPROCEDURE EVALUATION
* No procedures listed *. MAC    Anesthesia Post Evaluation        Patient location during evaluation: PACU  Patient participation: complete - patient participated  Level of consciousness: awake  Pain score: 0  Pain management: adequate  Airway patency: patent  Anesthetic complications: no  Cardiovascular status: acceptable and hemodynamically stable  Respiratory status: spontaneous ventilation  Hydration status: acceptable  Post anesthesia nausea and vomiting:  controlled  Final Post Anesthesia Temperature Assessment:  Normothermia (36.0-37.5 degrees C)      INITIAL Post-op Vital signs: No vitals data found for the desired time range.

## 2022-05-04 NOTE — PROGRESS NOTES
OT evaluation order received and acknowledged. OT evaluation attempted at 1401 however pt currently off of the floor for procedure. Will continue to follow and re-attempt OT eval at a later time. Thank you.

## 2022-05-04 NOTE — PROGRESS NOTES
CM reviewed chart and spoke to primary physician. Patient is having kyphoplasty today. CM will await PT/OT recommendations after procedure to determine what patient will need at discharge. CM will continue to follow.

## 2022-05-04 NOTE — PROGRESS NOTES
Problem: Falls - Risk of  Goal: *Absence of Falls  Description: Document Tati Burkett Fall Risk and appropriate interventions in the flowsheet. Outcome: Progressing Towards Goal  Note: Fall Risk Interventions:  Mobility Interventions: Bed/chair exit alarm    Mentation Interventions: Adequate sleep, hydration, pain control,Bed/chair exit alarm    Medication Interventions: Bed/chair exit alarm         History of Falls Interventions: Bed/chair exit alarm         Problem: Patient Education: Go to Patient Education Activity  Goal: Patient/Family Education  Outcome: Progressing Towards Goal     Problem: Pressure Injury - Risk of  Goal: *Prevention of pressure injury  Description: Document Chris Scale and appropriate interventions in the flowsheet.   Outcome: Progressing Towards Goal  Note: Pressure Injury Interventions:  Sensory Interventions: Assess changes in LOC,Assess need for specialty bed    Moisture Interventions: Absorbent underpads,Apply protective barrier, creams and emollients    Activity Interventions: Assess need for specialty bed    Mobility Interventions: HOB 30 degrees or less,Pressure redistribution bed/mattress (bed type),PT/OT evaluation    Nutrition Interventions: Document food/fluid/supplement intake    Friction and Shear Interventions: Minimize layers,Apply protective barrier, creams and emollients                Problem: Patient Education: Go to Patient Education Activity  Goal: Patient/Family Education  Outcome: Progressing Towards Goal

## 2022-05-04 NOTE — ANESTHESIA PREPROCEDURE EVALUATION
Relevant Problems   No relevant active problems       Anesthetic History   No history of anesthetic complications            Review of Systems / Medical History  Patient summary reviewed, nursing notes reviewed and pertinent labs reviewed    Pulmonary  Within defined limits                 Neuro/Psych             Comments: Hearing loss, hearing aids dependent. Cardiovascular    Hypertension      CHF          Comments: EKG (5-1-22) Sinus rhythm with Premature supraventricular complexes and with occasional   Premature ventricular complexes   Baseline artifact   Left axis deviation   Incomplete right bundle branch block   Minimal voltage criteria for LVH, may be normal variant   Nonspecific ST and T wave abnormality   Abnormal ECG    GI/Hepatic/Renal     GERD    Renal disease (STAGE III KIDNEY DISEASE. ): CRI       Endo/Other    Diabetes        Comments: Dry lips / mucosa. Other Findings   Comments: Ch. BACK PAIN. Physical Exam    Airway    TM Distance: 4 - 6 cm  Neck ROM: normal range of motion   Mouth opening: Diminished (comment)     Cardiovascular    Rhythm: regular  Rate: normal         Dental      Comments: Dentures.     Pulmonary  Breath sounds clear to auscultation               Abdominal         Other Findings            Anesthetic Plan    ASA: 3  Anesthesia type: MAC          Induction: Intravenous  Anesthetic plan and risks discussed with: Patient and Son / Daughter

## 2022-05-05 LAB
ANION GAP SERPL CALC-SCNC: 9 MMOL/L (ref 5–15)
BASOPHILS # BLD: 0 K/UL (ref 0–0.1)
BASOPHILS NFR BLD: 0 % (ref 0–1)
BUN SERPL-MCNC: 42 MG/DL (ref 6–20)
BUN/CREAT SERPL: 21 (ref 12–20)
CA-I BLD-MCNC: 9.2 MG/DL (ref 8.5–10.1)
CHLORIDE SERPL-SCNC: 102 MMOL/L (ref 97–108)
CO2 SERPL-SCNC: 26 MMOL/L (ref 21–32)
CREAT SERPL-MCNC: 1.97 MG/DL (ref 0.55–1.02)
DIFFERENTIAL METHOD BLD: ABNORMAL
EOSINOPHIL # BLD: 0.1 K/UL (ref 0–0.4)
EOSINOPHIL NFR BLD: 1 % (ref 0–7)
ERYTHROCYTE [DISTWIDTH] IN BLOOD BY AUTOMATED COUNT: 13.7 % (ref 11.5–14.5)
GLUCOSE BLD STRIP.AUTO-MCNC: 171 MG/DL (ref 65–117)
GLUCOSE BLD STRIP.AUTO-MCNC: 204 MG/DL (ref 65–117)
GLUCOSE BLD STRIP.AUTO-MCNC: 219 MG/DL (ref 65–117)
GLUCOSE BLD STRIP.AUTO-MCNC: 250 MG/DL (ref 65–117)
GLUCOSE SERPL-MCNC: 278 MG/DL (ref 65–100)
HCT VFR BLD AUTO: 37.2 % (ref 35–47)
HGB BLD-MCNC: 12 G/DL (ref 11.5–16)
IMM GRANULOCYTES # BLD AUTO: 0.1 K/UL (ref 0–0.04)
IMM GRANULOCYTES NFR BLD AUTO: 1 % (ref 0–0.5)
LYMPHOCYTES # BLD: 0.8 K/UL (ref 0.8–3.5)
LYMPHOCYTES NFR BLD: 7 % (ref 12–49)
MCH RBC QN AUTO: 29.6 PG (ref 26–34)
MCHC RBC AUTO-ENTMCNC: 32.3 G/DL (ref 30–36.5)
MCV RBC AUTO: 91.6 FL (ref 80–99)
MONOCYTES # BLD: 0.6 K/UL (ref 0–1)
MONOCYTES NFR BLD: 5 % (ref 5–13)
NEUTS SEG # BLD: 10 K/UL (ref 1.8–8)
NEUTS SEG NFR BLD: 86 % (ref 32–75)
NRBC # BLD: 0 K/UL (ref 0–0.01)
NRBC BLD-RTO: 0 PER 100 WBC
PERFORMED BY, TECHID: ABNORMAL
PLATELET # BLD AUTO: 334 K/UL (ref 150–400)
PMV BLD AUTO: 9.7 FL (ref 8.9–12.9)
POTASSIUM SERPL-SCNC: 4.6 MMOL/L (ref 3.5–5.1)
RBC # BLD AUTO: 4.06 M/UL (ref 3.8–5.2)
SODIUM SERPL-SCNC: 137 MMOL/L (ref 136–145)
WBC # BLD AUTO: 11.6 K/UL (ref 3.6–11)

## 2022-05-05 PROCEDURE — 82962 GLUCOSE BLOOD TEST: CPT

## 2022-05-05 PROCEDURE — 74011636637 HC RX REV CODE- 636/637: Performed by: NURSE PRACTITIONER

## 2022-05-05 PROCEDURE — 97165 OT EVAL LOW COMPLEX 30 MIN: CPT

## 2022-05-05 PROCEDURE — 97161 PT EVAL LOW COMPLEX 20 MIN: CPT

## 2022-05-05 PROCEDURE — 74011250637 HC RX REV CODE- 250/637: Performed by: NURSE PRACTITIONER

## 2022-05-05 PROCEDURE — 85025 COMPLETE CBC W/AUTO DIFF WBC: CPT

## 2022-05-05 PROCEDURE — 80048 BASIC METABOLIC PNL TOTAL CA: CPT

## 2022-05-05 PROCEDURE — 36415 COLL VENOUS BLD VENIPUNCTURE: CPT

## 2022-05-05 PROCEDURE — 97530 THERAPEUTIC ACTIVITIES: CPT

## 2022-05-05 PROCEDURE — 74011000250 HC RX REV CODE- 250: Performed by: NURSE PRACTITIONER

## 2022-05-05 PROCEDURE — 65270000029 HC RM PRIVATE

## 2022-05-05 RX ADMIN — Medication 2000 UNITS: at 09:05

## 2022-05-05 RX ADMIN — ALLOPURINOL 300 MG: 300 TABLET ORAL at 09:05

## 2022-05-05 RX ADMIN — SODIUM CHLORIDE, PRESERVATIVE FREE 10 ML: 5 INJECTION INTRAVENOUS at 23:36

## 2022-05-05 RX ADMIN — SODIUM CHLORIDE, PRESERVATIVE FREE 10 ML: 5 INJECTION INTRAVENOUS at 16:37

## 2022-05-05 RX ADMIN — CETIRIZINE HYDROCHLORIDE 10 MG: 10 TABLET, FILM COATED ORAL at 10:50

## 2022-05-05 RX ADMIN — FERROUS SULFATE TAB 325 MG (65 MG ELEMENTAL FE) 325 MG: 325 (65 FE) TAB at 09:06

## 2022-05-05 RX ADMIN — LISINOPRIL 20 MG: 10 TABLET ORAL at 09:06

## 2022-05-05 RX ADMIN — VERAPAMIL HYDROCHLORIDE 240 MG: 120 CAPSULE, DELAYED RELEASE PELLETS ORAL at 10:50

## 2022-05-05 RX ADMIN — INSULIN LISPRO 3 UNITS: 100 INJECTION, SOLUTION INTRAVENOUS; SUBCUTANEOUS at 13:33

## 2022-05-05 RX ADMIN — DULOXETINE 30 MG: 30 CAPSULE, DELAYED RELEASE ORAL at 09:05

## 2022-05-05 RX ADMIN — ALOGLIPTIN 6.25 MG: 6.25 TABLET, FILM COATED ORAL at 10:51

## 2022-05-05 RX ADMIN — HYDRALAZINE HYDROCHLORIDE 50 MG: 50 TABLET, FILM COATED ORAL at 09:06

## 2022-05-05 RX ADMIN — FUROSEMIDE 40 MG: 40 TABLET ORAL at 09:05

## 2022-05-05 RX ADMIN — ACETAMINOPHEN 650 MG: 325 TABLET ORAL at 23:31

## 2022-05-05 RX ADMIN — FAMOTIDINE 20 MG: 20 TABLET ORAL at 09:06

## 2022-05-05 RX ADMIN — INSULIN LISPRO 4 UNITS: 100 INJECTION, SOLUTION INTRAVENOUS; SUBCUTANEOUS at 16:37

## 2022-05-05 RX ADMIN — INSULIN LISPRO 7 UNITS: 100 INJECTION, SOLUTION INTRAVENOUS; SUBCUTANEOUS at 09:03

## 2022-05-05 RX ADMIN — HYDROCODONE BITARTRATE AND ACETAMINOPHEN 1 TABLET: 5; 325 TABLET ORAL at 10:55

## 2022-05-05 RX ADMIN — ASPIRIN 81 MG: 81 TABLET, COATED ORAL at 09:06

## 2022-05-05 RX ADMIN — INSULIN LISPRO 2 UNITS: 100 INJECTION, SOLUTION INTRAVENOUS; SUBCUTANEOUS at 23:31

## 2022-05-05 RX ADMIN — HYDRALAZINE HYDROCHLORIDE 50 MG: 50 TABLET, FILM COATED ORAL at 23:31

## 2022-05-05 RX ADMIN — SODIUM CHLORIDE, PRESERVATIVE FREE 10 ML: 5 INJECTION INTRAVENOUS at 04:47

## 2022-05-05 RX ADMIN — MELATONIN TAB 3 MG 3 MG: 3 TAB at 23:31

## 2022-05-05 NOTE — PROGRESS NOTES
OCCUPATIONAL THERAPY EVALUATION  Patient: Guy Johnson (83 y.o. female)  Date: 5/5/2022  Primary Diagnosis: Compression fracture of L2 ([facility] [address] [S32.020A]  Compression fracture of L2 lumbar vertebra ([address] [S32.020A]        Precautions: fall risk, log rolling, spinal precautions       ASSESSMENT  Pt is a 81 y/o F with PMH of CHF, type 2 diabetes, hypertension, CKD that presented to the [facility] ED on 5/1/2022 for back pain for approximately 2 weeks. CT lumbar spine 4/27 showed L2 compression fracture that appeared acute. MRI of lumbar spine showed acute/early subacute compression fracture of L2 vertebral body, advanced degenerative facet joint disease at L4-L5 with grade 1 anterior spondylolisthesis L4 on L5, severe foraminal stenosis on the right with compression of the right L4 nerve within the neuroforamen, moderate foraminal stenosis at L2-L3 level. Pt is s/p kyphoplasty on 5/4/22 with IR. Per PA via perfect serve message/IDR, no need for back brace at this time. Pt received semi-supine in bed upon arrival, AXO x4, and agreeable to OT/PT evaluations at this time. Daughter also present with pt permission. Per pt report, pt lives with alone (family lives nearby and checks on her) in a one-story mobile home with 5 MARQUES and B HR, was IND with ADLs and Mod I using a three-wheeled rollator for mobility at [facility]. Other DME owned includes: grab bars over toilet. Based on current observations, pt presents with deficits in generalized strength/AROM, bed mobility, static/dynamic sitting balance, static/dynamic standing balance, functional activity tolerance, and pain impacting overall performance of ADLs and functional transfers/mobility. Pt educated on spinal precautions with good understanding verbalized prior to OOB activity.  Pt currently requires SBA with bed mobility via log roll technique, mod A donning socks to feet crossing LE over opposite knee and CGA sit><stand transfers to/from EOB and commode using gt belt and RW, cont CGA for balance/safety with OOB mobility (see PT note for gait details). Pt inconitnent of urine prior to descent onto toilet and clean socks donned with total A. Pt completed anterior ollie care s/p setup in sitting and hand washing CGA standing sink side with RW for balance while reaching out of MARE. Pt left resting comfortably in semi-supine with call bell/needs in reach, (s/p setup for face washing) daughter present at end of evaluation. Overall, pt tolerates session fair with c/o 7/10 pain to back. Pt would benefit from continued skilled OT services to address current impairments and improve IND and safety with self cares and functional transfers/mobility. Current OT d/c recommendation SNF once medically appropriate. Other factors to consider for discharge: family/social support, DME, time since onset, severity of deficits, decline from functional baseline     Patient will benefit from skilled therapy intervention to address the above noted impairments. PLAN :  Recommendations and Planned Interventions: self care training, functional mobility training, therapeutic exercise, balance training, therapeutic activities, endurance activities, patient education and family training/education    Frequency/Duration: Patient will be followed by occupational therapy:  3-5x/week to address goals. Recommendation for discharge: (in order for the patient to meet his/her long term goals)  Oskar Vasquez    This discharge recommendation:  Has been made in collaboration with the attending provider and/or case management       SUBJECTIVE:   Patient stated I need to use the bathroom.     OBJECTIVE DATA SUMMARY:   HISTORY:   Past Medical History:   Diagnosis Date    [facility]F [facility]congestive [facility] failure) ([facility] [address]     Chronic kidney disease     Diabetes ([facility] [address]     Hypertension      Past Surgical History:   Procedure Laterality Date    IR KYPHOPLASTY LUMBAR  5/4/2022       Expanded or extensive additional review of patient history:     Home Situation  Home Environment: Private residence  # Steps to Enter: 4  Rails to Enter: Yes  Hand Rails : Bilateral  One/Two Story Residence: One story  Living Alone: Yes  Support Systems: Child(christopher)  Patient Expects to be Discharged to[de-identified] Home with home health  Current DME Used/Available at Home: Grab bars  Tub or Shower Type: Tub/Shower combination      EXAMINATION OF PERFORMANCE DEFICITS:  Cognitive/Behavioral Status:  Neurologic State: Alert  Orientation Level: Oriented X4  Cognition: Follows commands               Hearing: Auditory  Auditory Impairment: Hard of hearing, bilateral    Vision/Perceptual:                                Corrective Lenses: Glasses    Range of Motion:  AROM: Within functional limits                         Strength:  Strength: Generally decreased, functional                Coordination:  Coordination: Within functional limits  Fine Motor Skills-Upper: Left Intact; Right Intact    Gross Motor Skills-Upper: Left Intact; Right Intact    Tone & Sensation:  Sensation: Intact                      Balance:  Sitting: Impaired; Without support  Sitting - Static: Good (unsupported)  Sitting - Dynamic: Fair (occasional)  Standing: Impaired; With support  Standing - Static: Fair;Constant support  Standing - Dynamic : Fair;Constant support    Functional Mobility and Transfers for ADLs:  Bed Mobility:  Rolling: Stand-by assistance  Supine to Sit: Stand-by assistance  Sit to Supine: Stand-by assistance  Scooting: Stand-by assistance    Transfers:  Sit to Stand: Contact guard assistance  Stand to Sit: Contact guard assistance  Bathroom Mobility: Contact guard assistance  Toilet Transfer : Contact guard assistance    ADL Intervention and task modifications:       Grooming  Grooming Assistance: Contact guard assistance  Position Performed: Standing  Washing Hands: Contact guard assistance                   Lower Body Dressing Assistance  Socks:  Moderate assistance  Leg Crossed Method Used: Yes  Position Performed: Seated edge of bed    Toileting  Toileting Assistance: Set-up; Stand-by assistance (seated on commode)  Bladder Hygiene: Set-up; Stand-by assistance         Therapeutic Exercise:  Pt would benefit from UE HEP to improve overall UE AROM/strength and can be further educated in next treatment session. Functional Measure:    [address] [place] [address] AM-PACTM \"6 Clicks\"                                                       Daily Activity Inpatient Short Form  How much help from another person does the patient currently need. .. Total; A Lot A Little None   1. Putting on and taking off regular lower body clothing? []  1 []  2 [x]  3 []  4   2. Bathing (including washing, rinsing, drying)? []  1 []  2 [x]  3 []  4   3. Toileting, which includes using toilet, bedpan or urinal? [] 1 []  2 [x]  3 []  4   4. Putting on and taking off regular upper body clothing? []  1 []  2 [x]  3 []  4   5. Taking care of personal grooming such as brushing teeth? []  1 []  2 [x]  3 []  4   6. Eating meals? []  1 []  2 []  3 [x]  4   © 2007, Trustees of [address] [place] [id number], under license to [employer]. All rights reserved     Score: 19/24     Interpretation of Tool:  Represents clinically-significant functional categories (i.e. Activities of daily living). Percentage of Impairment CH    0%   CI    1-19% CJ    20-39% CK    40-59% CL    60-79% CM    80-99% CN     100%   [facility]  Score 6-24 24 23 20-22 15-19 10-14 7-9 6         Occupational Therapy Evaluation Charge Determination   History Examination Decision-Making   LOW Complexity : Brief history review  MEDIUM Complexity : 3-5 performance deficits relating to physical, cognitive , or psychosocial skils that result in activity limitations and / or participation restrictions MEDIUM Complexity : Patient may present with comorbidities that affect occupational performnce.  Miniml to moderate modification of tasks or assistance (eg, physical or verbal ) with assesment(s) is necessary to enable patient to complete evaluation       Based on the above components, the patient evaluation is determined to be of the following complexity level: LOW   Pain Ratin/10    Activity Tolerance:   Fair    After treatment patient left in no apparent distress:    Supine in bed, Call bell within reach, Caregiver / family present and Side rails x 3    COMMUNICATION/EDUCATION:   The patients plan of care was discussed with: Physical therapist and Registered nurse. Patient/family have participated as able in goal setting and plan of care. and Patient/family agree to work toward stated goals and plan of care. This patients plan of care is appropriate for delegation to [facility] [place]. OT/PT sessions occurred together for increased patient and clinician safety as pt with decreased activity tolerance.      Thank you for this referral.  Josh Anna  Time Calculation: 38 mins   Problem: Self Care Deficits Care Plan (Adult)  Goal: *Acute Goals and Plan of Care (Insert Text)  Description: Pt stated goal \"to feel better\"  Pt will be Mod I sup <> sit in prep for EOB ADLs  Pt will be Mod I grooming standing sink side LRAD  Pt will be IND UB dressing sitting EOB/long sit   Pt will be Mod I LE dressing sitting EOB/long sit  Pt will be Mod I sit <>  prep for toileting LRAD  Pt will be Mod I toileting/toilet transfer/cloth mgmt LRAD  Pt will be IND following UE HEP in prep for self care tasks      Outcome: Not Met

## 2022-05-05 NOTE — PROGRESS NOTES
PHYSICAL THERAPY EVALUATION  Patient: Sherl Hodgkins (73 y.o. female)  Date: 5/5/2022  Primary Diagnosis: Compression fracture of L2 ([facility] [address] [S32.020A]  Compression fracture of L2 lumbar vertebra ([address] [S32.020A]       Precautions: falls, spine precautions      ASSESSMENT  This is a 79 y/o female who came to  [facility]  ED with c/o  back pain for approximately 2 weeks, admitted on 5/1/22 for compression fracture of L2 vertebra. CT lumbar spine 4/27 showed L2 compression fracture that appeared acute. MRI of lumbar spine showed acute/early subacute compression fracture of L2 vertebral body, advanced degenerative facet joint disease at L4-L5 with grade 1 anterior spondylolisthesis L4 on L5, severe foraminal stenosis on the right with compression of the right L4 nerve within the neuroforamen, moderate foraminal stenosis at L2-L3 level. Pt is s/p kyphoplasty on 5/4/22 with IR. Per PA via perfect serve message/IDR, no need for back brace at this time. Pt with PMH of  CHF, type 2 diabetes, hypertension, CKD      Pt received semi-supine in bed , daughter in the room, pt agreeable for PT/OT eval/tx . Pt is A& O x 4 ,  per pt report , she lives alone (family lives nearby and checks on her) in a single story mobile home with 5 steps to enter , HR on b/l sides ,  was IND with ADL's and mod I for functional transfers/mobility with 3 wheeled rollator prior to admission. Other DME owned includes: grab bars over toilet.         Based on the objective data described below, the patient presents with c/o pain at back- 7/10 ,  decreased strength , 3+/5 grossly in  b/l LE , balance deficits , generalized weakness , decreased activity tolerance and increased need for assist with functional mobility ( needs SBA for log rolling  to R side  , SBA for supine <>sit transfers , intact  static sitting balance , CGA for sit <>stand and bed<>chair transfers, good  static  standing balance with constant support, is able to ambulate - 36' with RW, CGA with slow johanny and decreased step length b/l Le, requires vc's to stay close to the RW and for upright posture ). Pt educated about spinal precautions, pt verbalizes understanding. Pt would benefit from continued skilled PT services to address current impairments and improve overall IND and safety with  functional transfers/mobility. Recommend d/c to SNF when medically appropriate . Current Level of Function Impacting Discharge (mobility/balance): Pt requires CGA for functional transfers/mobility    Functional Outcome Measure: The patient scored 18 on the AM PAC basic mobility outcome measure which is indicative of medium complexity. Other factors to consider for discharge: severity of deficits, lives alone, time since onset          PLAN :  Recommendations and Planned Interventions: bed mobility training, transfer training, gait training, therapeutic exercises, neuromuscular re-education, patient and family training/education and therapeutic activities      Frequency/Duration: Patient will be followed by physical therapy:  3-5x/week to address goals.     Recommendation for discharge: (in order for the patient to meet his/her long term goals)  Oskar Vasquez    This discharge recommendation:  Has been made in collaboration with the attending provider and/or case management    IF patient discharges home will need the following DME: rolling walker         SUBJECTIVE:   Patient stated  My back hurts     OBJECTIVE DATA SUMMARY:   HISTORY:    Past Medical History:   Diagnosis Date    CHF (congestive heart failure) ([facility] [address]     Chronic kidney disease     Diabetes ([facility] [address]     Hypertension      Past Surgical History:   Procedure Laterality Date    IR KYPHOPLASTY LUMBAR  5/4/2022       Personal factors and/or comorbidities impacting plan of care:     Home Situation  Home Environment: Private residence  # Steps to Enter: 4  Rails to Enter: Yes  Hand Rails : Bilateral  One/Two Story Residence: [facility] story  Living Alone: Yes  Support Systems: Child(christopher)  Patient Expects to be Discharged to[de-identified] Skilled nursing facility  Current DME Used/Available at Home: Grab bars  Tub or Shower Type: Tub/Shower combination    PLOF: Pt IND for ADLS/IADLS, mod I for mobility with 3 wheeled rollator prior to admission. EXAMINATION/PRESENTATION/DECISION MAKING:   Critical Behavior:  Neurologic State: Alert  Orientation Level: Oriented X4  Cognition: Follows commands     Hearing: Auditory  Auditory Impairment: Hard of hearing, bilateral  Skin:  Intact where exposed    Range Of Motion:  AROM: Within functional limits  Strength:    Strength: Generally decreased, functional (3+/5 grossly for b/l LE)  Tone & Sensation:   Sensation: Intact    Coordination:  Coordination: Within functional limits  Vision:   Corrective Lenses: Glasses  Functional Mobility:  Bed Mobility:  Rolling: Stand-by assistance  Supine to Sit: Stand-by assistance  Sit to Supine: Stand-by assistance  Scooting: Stand-by assistance  Transfers:  Sit to Stand: Contact guard assistance  Stand to Sit: Contact guard assistance  Balance:   Sitting: Impaired; Without support  Sitting - Static: Good (unsupported)  Sitting - Dynamic: Fair (occasional)  Standing: Impaired; With support  Standing - Static: Good;Constant support  Standing - Dynamic : Fair;Constant support  Ambulation/Gait Training:  Distance (ft): 40 Feet (ft)  Assistive Device: Walker, rolling;Gait belt  Ambulation - Level of Assistance: Contact guard assistance  Speed/Mary: Slow  Step Length: Right shortened;Left shortened    Functional Measure:    [address] [facility] [address] [address] AM-PAC 6 Clicks         Basic Mobility Inpatient Short Form  How much difficulty does the patient currently have. .. Unable A Lot A Little None   1. Turning over in bed (including adjusting bedclothes, sheets and blankets)? [] 1   [] 2   [x] 3   [] 4   2.   Sitting down on and standing up from a chair with arms ( e.g., wheelchair, bedside commode, etc.)   [] 1   [] 2   [x] 3   [] 4   3. Moving from lying on back to sitting on the side of the bed? [] 1   [] 2   [x] 3   [] 4          How much help from another person does the patient currently need. .. Total A Lot A Little None   4. Moving to and from a bed to a chair (including a wheelchair)? [] 1   [] 2   [x] 3   [] 4   5. Need to walk in hospital room? [] 1   [] 2   [x] 3   [] 4   6. Climbing 3-5 steps with a railing? [] 1   [] 2   [x] 3   [] 4   © 2007, Trustees of [address] [place] [address] [address] [address], under license to [employer]. All rights reserved     Score:  Initial:18 Most Recent: X (Date: -5/5/22- )   Interpretation of Tool:  Represents activities that are increasingly more difficult (i.e. Bed mobility, Transfers, Gait). Score 24 23 22-20 19-15 14-10 9-7 6   Modifier CH CI CJ CK CL CM CN          Physical Therapy Evaluation Charge Determination   History Examination Presentation Decision-Making   LOW Complexity : Zero comorbidities / personal factors that will impact the outcome / POC MEDIUM Complexity : 3 Standardized tests and measures addressing body structure, function, activity limitation and / or participation in recreation  MEDIUM Complexity : Evolving with changing characteristics  Other Functional Measure [facility] 6 medium complesity      Based on the above components, the patient evaluation is determined to be of the following complexity level: LOW     Pain Rating:  Pain at back - 7/10    Activity Tolerance:   Fair  Please refer to the flowsheet for vital signs taken during this treatment. After treatment patient left in no apparent distress:   Supine in bed and Call bell within reach    COMMUNICATION/EDUCATION:   The patients plan of care was discussed with: Occupational therapist and Registered nurse. Fall prevention education was provided and the patient/caregiver indicated understanding. and Patient/family agree to work toward stated goals and plan of care.     Problem: Mobility Impaired (Adult and Pediatric)  Goal: *Acute Goals and Plan of Care (Insert Text)  Description: Pt will be I with LE HEP in 7 days. Pt will perform bed mobility with mod I in 7 days. Pt will perform transfers with mod I in 7 days. Pt will amb >150 feet with LRAD safely with mod I in 7 days. Outcome: Not Met   PT/OT sessions occurred together for increased pt's safety and maximum functional tolerance.     Thank you for this referral.  Jocelyn Can   Time Calculation: 39 mins

## 2022-05-05 NOTE — PROGRESS NOTES
CM discussed discharge planning with patient and daughter at bedside. Choice letter signed for [facility]. Referral sent. Patient accepted by [facility]. Bed will be available tomorrow to receive patient. Patient and family aware and agree.

## 2022-05-05 NOTE — PROGRESS NOTES
Hospitalist Progress Note    Subjective:   Daily Progress Note: 5/5/2022 9:20 AM    Hospital Course: Patient is a 59-year-old female with a history of CHF, type 2 diabetes, hypertension, CKD that presented to the emergency room on 5/1/2022 for back pain for approximately 2 weeks. Patient had a CT of the lumbar spine done on 4/27/2022 which revealed an L2 compression fracture that appeared acute. Vital signs and laboratory data unremarkable in the ED. It was recommended admission for possible IR evaluation and/or kyphoplasty. MRI of lumbar spine shows acute/early subacute compression fracture of L2 vertebral body, advanced degenerative facet joint disease at L4-L5 with grade 1 anterior spondylolisthesis L4 on L5, severe foraminal stenosis on the right with compression of the right L4 nerve within the neuroforamen, moderate foraminal stenosis at L2-L3 level. Patient went for a kyphoplasty on 5/4/2022      Subjective: Patient says her back hurts. Deneis any chest pain, SOB, numbness, or tingling.      Current Facility-Administered Medications   Medication Dose Route Frequency    0.9% sodium chloride infusion  50 mL/hr IntraVENous CONTINUOUS    sodium chloride (NS) flush 5-40 mL  5-40 mL IntraVENous Q8H    sodium chloride (NS) flush 5-40 mL  5-40 mL IntraVENous PRN    acetaminophen (TYLENOL) tablet 650 mg  650 mg Oral Q6H PRN    Or    acetaminophen (TYLENOL) suppository 650 mg  650 mg Rectal Q6H PRN    polyethylene glycol (MIRALAX) packet 17 g  17 g Oral DAILY PRN    bisacodyL (DULCOLAX) tablet 5 mg  5 mg Oral DAILY PRN    ondansetron (ZOFRAN ODT) tablet 4 mg  4 mg Oral Q6H PRN    Or    ondansetron (ZOFRAN) injection 4 mg  4 mg IntraVENous Q6H PRN    famotidine (PEPCID) tablet 20 mg  20 mg Oral DAILY    glucose chewable tablet 16 g  4 Tablet Oral PRN    glucagon (GLUCAGEN) injection 1 mg  1 mg IntraMUSCular PRN    insulin lispro (HUMALOG) injection   SubCUTAneous AC&HS    DULoxetine (CYMBALTA) capsule 30 mg  30 mg Oral DAILY    cetirizine (ZYRTEC) tablet 10 mg  10 mg Oral DAILY    HYDROcodone-acetaminophen (NORCO) 5-325 mg per tablet 1 Tablet  1 Tablet Oral Q4H PRN    alogliptin (NESINA) tablet 6.25 mg  6.25 mg Oral DAILY    lisinopriL (PRINIVIL, ZESTRIL) tablet 20 mg  20 mg Oral DAILY    verapamil ER (VERELAN) capsule 240 mg  240 mg Oral DAILY WITH BREAKFAST    allopurinoL (ZYLOPRIM) tablet 300 mg  300 mg Oral DAILY    aspirin delayed-release tablet 81 mg  81 mg Oral DAILY    ferrous sulfate tablet 325 mg  325 mg Oral ACB    hydrALAZINE (APRESOLINE) tablet 50 mg  50 mg Oral BID    melatonin tablet 3 mg  3 mg Oral QHS    cholecalciferol (VITAMIN D3) (1000 Units /25 mcg) tablet 2,000 Units  2,000 Units Oral DAILY    furosemide (LASIX) tablet 40 mg  40 mg Oral DAILY        Review of Systems  Constitutional: No fevers, No chills, No sweats, +fatigue, ++ Weakness  Eyes: No redness  Ears, nose, mouth, throat, and face: No nasal congestion, No sore throat, No voice change  Respiratory: No Shortness of Breath, No cough, No wheezing  Cardiovascular: No chest pain, No palpitations, No extremity edema  Gastrointestinal: No nausea, No vomiting, No diarrhea, No abdominal pain  Genitourinary: No frequency, No dysuria, No hematuria  Integument/breast: No skin lesion(s)   Neurological: No Confusion, No headaches, No dizziness      Objective:     Visit Vitals  BP (!) 144/69 (BP 1 Location: Left upper arm)   Pulse 90   Temp 98.1 °F (36.7 °C)   Resp 18   Ht 5' 2\" (1.575 m)   Wt 69.9 kg (154 lb)   SpO2 94%   BMI 28.17 kg/m²      O2 Device: None (Room air)    Temp (24hrs), Av.9 °F (36.6 °C), Min:97.4 °F (36.3 °C), Max:98.3 °F (36.8 °C)      No intake/output data recorded.  1901 -  0700  In: 700 [I.V.:700]  Out: -     PHYSICAL EXAM:  Constitutional: No acute distress  Skin: Extremities and face reveal no rashes. HEENT: Sclerae anicteric. Extra-occular muscles are intact.    Cardiovascular: RRR  Respiratory:  nonlabored  GI: Abdomen nondistended, soft, and nontender. Normal active bowel sounds. Musculoskeletal: No pitting edema of the lower legs. Able to move all ext  Neurological:  Patient is alert and oriented. Cranial nerves II-XII grossly intact  Psychiatric: Mood appears appropriate       Data Review    Recent Results (from the past 24 hour(s))   GLUCOSE, POC    Collection Time: 05/04/22  8:53 AM   Result Value Ref Range    Glucose (POC) 171 (H) 65 - 117 mg/dL    Performed by [employer], POC    Collection Time: 05/04/22 11:45 AM   Result Value Ref Range    Glucose (POC) 192 (H) 65 - 117 mg/dL    Performed by [employer], POC    Collection Time: 05/04/22  3:57 PM   Result Value Ref Range    Glucose (POC) 191 (H) 65 - 117 mg/dL    Performed by Viamericas Blanka    GLUCOSE, POC    Collection Time: 05/04/22  8:05 PM   Result Value Ref Range    Glucose (POC) 399 (H) 65 - 117 mg/dL    Performed by Viamericas Blanka    GLUCOSE, POC    Collection Time: 05/05/22  7:02 AM   Result Value Ref Range    Glucose (POC) 250 (H) 65 - 117 mg/dL    Performed by Brittaney Almanzar    METABOLIC PANEL, BASIC    Collection Time: 05/05/22  7:23 AM   Result Value Ref Range    Sodium 137 136 - 145 mmol/L    Potassium 4.6 3.5 - 5.1 mmol/L    Chloride 102 97 - 108 mmol/L    CO2 26 21 - 32 mmol/L    Anion gap 9 5 - 15 mmol/L    Glucose 278 (H) 65 - 100 mg/dL    BUN 42 (H) 6 - 20 mg/dL    Creatinine 1.97 (H) 0.55 - 1.02 mg/dL    BUN/Creatinine ratio 21 (H) 12 - 20      GFR est AA 29 (L) >60 ml/min/1.73m2    GFR est non-AA 24 (L) >60 ml/min/1.73m2    Calcium 9.2 8.5 - 10.1 mg/dL       Assessment:   1. L2 compression fracture s/p kyphoplasty #1  2. Hypertension  3. CHF  4. Type 2 diabetes  5. CKD    Plan:    1.  CT of the cervical spine on 4/27/2022 shows signs concerning for L2 compression fracture. Unable to locate these results as done at an outside facility. IR consulted.   Patient on Norco and Cymbalta. MRI reviewed. Kyphoplasty at 12 30-1 today with interventional radiology and anesthesiology. PT and OT postop  2. Blood pressure is stable. Continue to monitor per unit protocol. Patient on Lasix, hydralazine, lisinopril, verapamil. 3.  Patient is on aspirin and diuretic. No signs of fluid overload  4. Blood glucose levels are stable. On insulin sliding scale. On alogliptin. 5.  Renal function is stable. Hold nephrotoxic medications. Stable. Dispo: 24-48 hours. Barriers  PT OT elevation. Suspect patient will require home with home health or IRF     CODE STATUS full     DVT prophylaxis: SCDs  Ulcer prophylaxis: Parmova 70 discussed with: Patient/Family, Nurse and     Total time spent with patient: 34 minutes.

## 2022-05-06 VITALS
SYSTOLIC BLOOD PRESSURE: 153 MMHG | RESPIRATION RATE: 16 BRPM | HEART RATE: 87 BPM | TEMPERATURE: 98 F | HEIGHT: 62 IN | BODY MASS INDEX: 28.34 KG/M2 | OXYGEN SATURATION: 96 % | DIASTOLIC BLOOD PRESSURE: 64 MMHG | WEIGHT: 154 LBS

## 2022-05-06 LAB
ANION GAP SERPL CALC-SCNC: 6 MMOL/L (ref 5–15)
APPEARANCE UR: CLEAR
BACTERIA URNS QL MICRO: NEGATIVE /HPF
BASOPHILS # BLD: 0.1 K/UL (ref 0–0.1)
BASOPHILS NFR BLD: 1 % (ref 0–1)
BILIRUB UR QL: NEGATIVE
BUN SERPL-MCNC: 52 MG/DL (ref 6–20)
BUN/CREAT SERPL: 23 (ref 12–20)
CA-I BLD-MCNC: 9.2 MG/DL (ref 8.5–10.1)
CHLORIDE SERPL-SCNC: 104 MMOL/L (ref 97–108)
CO2 SERPL-SCNC: 28 MMOL/L (ref 21–32)
COLOR UR: NORMAL
COVID-19 RAPID TEST, COVR: NOT DETECTED
CREAT SERPL-MCNC: 2.3 MG/DL (ref 0.55–1.02)
DIFFERENTIAL METHOD BLD: ABNORMAL
EOSINOPHIL # BLD: 0.6 K/UL (ref 0–0.4)
EOSINOPHIL NFR BLD: 5 % (ref 0–7)
ERYTHROCYTE [DISTWIDTH] IN BLOOD BY AUTOMATED COUNT: 13.8 % (ref 11.5–14.5)
GLUCOSE BLD STRIP.AUTO-MCNC: 191 MG/DL (ref 65–117)
GLUCOSE BLD STRIP.AUTO-MCNC: 240 MG/DL (ref 65–117)
GLUCOSE SERPL-MCNC: 194 MG/DL (ref 65–100)
GLUCOSE UR STRIP.AUTO-MCNC: NEGATIVE MG/DL
HCT VFR BLD AUTO: 36.6 % (ref 35–47)
HGB BLD-MCNC: 11.9 G/DL (ref 11.5–16)
HGB UR QL STRIP: NEGATIVE
IMM GRANULOCYTES # BLD AUTO: 0.1 K/UL (ref 0–0.04)
IMM GRANULOCYTES NFR BLD AUTO: 1 % (ref 0–0.5)
KETONES UR QL STRIP.AUTO: NEGATIVE MG/DL
LEUKOCYTE ESTERASE UR QL STRIP.AUTO: NEGATIVE
LYMPHOCYTES # BLD: 1.5 K/UL (ref 0.8–3.5)
LYMPHOCYTES NFR BLD: 12 % (ref 12–49)
MCH RBC QN AUTO: 30.1 PG (ref 26–34)
MCHC RBC AUTO-ENTMCNC: 32.5 G/DL (ref 30–36.5)
MCV RBC AUTO: 92.4 FL (ref 80–99)
MONOCYTES # BLD: 1.3 K/UL (ref 0–1)
MONOCYTES NFR BLD: 10 % (ref 5–13)
NEUTS SEG # BLD: 8.7 K/UL (ref 1.8–8)
NEUTS SEG NFR BLD: 71 % (ref 32–75)
NITRITE UR QL STRIP.AUTO: NEGATIVE
NRBC # BLD: 0 K/UL (ref 0–0.01)
NRBC BLD-RTO: 0 PER 100 WBC
PERFORMED BY, TECHID: ABNORMAL
PERFORMED BY, TECHID: ABNORMAL
PH UR STRIP: 5 [PH] (ref 5–8)
PLATELET # BLD AUTO: 342 K/UL (ref 150–400)
PMV BLD AUTO: 9.4 FL (ref 8.9–12.9)
POTASSIUM SERPL-SCNC: 4.1 MMOL/L (ref 3.5–5.1)
PROT UR STRIP-MCNC: NEGATIVE MG/DL
RBC # BLD AUTO: 3.96 M/UL (ref 3.8–5.2)
RBC #/AREA URNS HPF: NORMAL /HPF (ref 0–5)
SODIUM SERPL-SCNC: 138 MMOL/L (ref 136–145)
SP GR UR REFRACTOMETRY: 1.01 (ref 1–1.03)
UROBILINOGEN UR QL STRIP.AUTO: 0.1 EU/DL (ref 0.1–1)
WBC # BLD AUTO: 12.2 K/UL (ref 3.6–11)
WBC URNS QL MICRO: NORMAL /HPF (ref 0–4)

## 2022-05-06 PROCEDURE — 87086 URINE CULTURE/COLONY COUNT: CPT

## 2022-05-06 PROCEDURE — 85025 COMPLETE CBC W/AUTO DIFF WBC: CPT

## 2022-05-06 PROCEDURE — 80048 BASIC METABOLIC PNL TOTAL CA: CPT

## 2022-05-06 PROCEDURE — 87635 SARS-COV-2 COVID-19 AMP PRB: CPT

## 2022-05-06 PROCEDURE — 74011250637 HC RX REV CODE- 250/637: Performed by: NURSE PRACTITIONER

## 2022-05-06 PROCEDURE — 74011636637 HC RX REV CODE- 636/637: Performed by: NURSE PRACTITIONER

## 2022-05-06 PROCEDURE — 97530 THERAPEUTIC ACTIVITIES: CPT

## 2022-05-06 PROCEDURE — 81001 URINALYSIS AUTO W/SCOPE: CPT

## 2022-05-06 PROCEDURE — 82962 GLUCOSE BLOOD TEST: CPT

## 2022-05-06 PROCEDURE — 36415 COLL VENOUS BLD VENIPUNCTURE: CPT

## 2022-05-06 RX ADMIN — INSULIN LISPRO 3 UNITS: 100 INJECTION, SOLUTION INTRAVENOUS; SUBCUTANEOUS at 09:33

## 2022-05-06 RX ADMIN — ALOGLIPTIN 6.25 MG: 6.25 TABLET, FILM COATED ORAL at 09:34

## 2022-05-06 RX ADMIN — Medication 2000 UNITS: at 09:33

## 2022-05-06 RX ADMIN — FERROUS SULFATE TAB 325 MG (65 MG ELEMENTAL FE) 325 MG: 325 (65 FE) TAB at 09:34

## 2022-05-06 RX ADMIN — ALLOPURINOL 300 MG: 300 TABLET ORAL at 09:33

## 2022-05-06 RX ADMIN — CETIRIZINE HYDROCHLORIDE 10 MG: 10 TABLET, FILM COATED ORAL at 09:34

## 2022-05-06 RX ADMIN — INSULIN LISPRO 4 UNITS: 100 INJECTION, SOLUTION INTRAVENOUS; SUBCUTANEOUS at 12:01

## 2022-05-06 RX ADMIN — HYDRALAZINE HYDROCHLORIDE 50 MG: 50 TABLET, FILM COATED ORAL at 09:34

## 2022-05-06 RX ADMIN — VERAPAMIL HYDROCHLORIDE 240 MG: 120 CAPSULE, DELAYED RELEASE PELLETS ORAL at 09:33

## 2022-05-06 RX ADMIN — LISINOPRIL 20 MG: 10 TABLET ORAL at 09:34

## 2022-05-06 RX ADMIN — FUROSEMIDE 40 MG: 40 TABLET ORAL at 09:34

## 2022-05-06 RX ADMIN — DULOXETINE 30 MG: 30 CAPSULE, DELAYED RELEASE ORAL at 09:34

## 2022-05-06 RX ADMIN — FAMOTIDINE 20 MG: 20 TABLET ORAL at 09:34

## 2022-05-06 RX ADMIN — ASPIRIN 81 MG: 81 TABLET, COATED ORAL at 09:34

## 2022-05-06 NOTE — PROGRESS NOTES
VSS. INT removed with skin intact x2. Provided pt with discharge instruction with family at bedside, all verbalized understanding. Attempted to call report to nurse at [address] and was told by Renu Gallegos that the nurses were in a meeting, provided a number for someone to call me back, will follow up.      Nidhi Sepulveda RN

## 2022-05-06 NOTE — DISCHARGE INSTRUCTIONS
Hospitalist Discharge Instructions     Patient ID:    Carlitos East  [id number]  76 y.o.  2/17/1932    Admit date: 5/1/2022    Discharge date : 5/6/2022    Final Diagnoses:   1. L2 compression fracture s/p kyphoplasty #2  2. Hypertension  3. CHF  4. Type 2 diabetes  5. CKD  6. Leukocytosis/Reactive     Reason for Hospitalization: Back pain      Discharge Medications:   Current Discharge Medication List      CONTINUE these medications which have NOT CHANGED    Details   fexofenadine (ALLEGRA) 180 mg tablet       HYDROcodone-acetaminophen (NORCO) 5-325 mg per tablet       Lantus Solostar U-100 Insulin 100 unit/mL (3 mL) inpn       lisinopriL (PRINIVIL, ZESTRIL) 20 mg tablet       Januvia 50 mg tablet       verapamil ER (CALAN-SR) 240 mg CR tablet       hydrALAZINE (APRESOLINE) 50 mg tablet Take 50 mg by mouth two (2) times a day. ferrous sulfate 325 mg (65 mg iron) tablet Take 325 mg by mouth Daily (before breakfast). aspirin delayed-release 81 mg tablet Take 81 mg by mouth daily. allopurinoL (ZYLOPRIM) 300 mg tablet Take 300 mg by mouth daily. melatonin 3 mg tablet Take 3 mg by mouth nightly. furosemide (Lasix) 40 mg tablet Take 40 mg by mouth daily. cholecalciferol, vitamin D3, (Vitamin D3) 50 mcg (2,000 unit) tab Take 1 Tablet by mouth daily. DULoxetine (CYMBALTA) 60 mg capsule                Follow up Care:    1. Vargas Saldivar MD in 1-2 weeks. Follow-up Information     Follow up With Specialties Details Why Contact Info    Vargas Saldivar MD Family Medicine Schedule an appointment as soon as possible for a visit  [address] [place] [address]  [phone]              Patient Follow Up Instructions:    Activity: Activity as tolerated  Diet:  Cardiac Diet  Wound care: None Needed    Condition at Discharge: Stable  __________________________________________________________________    Disposition  SNF/LTC  ____________________________________________________________________    Code Status: Full Code  ___________________________________________________________________    CONSULTATIONS: KARIME Guerrero PA-C  5/6/2022  1:28 PM

## 2022-05-06 NOTE — PROGRESS NOTES
Patient has bed available at [facility]. She will go to room 115. Nurse to call report to [phone]. Needed clinicals sent. Primary nurse and family aware. No further CM services required.

## 2022-05-06 NOTE — PROGRESS NOTES
Patient had brief episode of confusion; tried to get out of bed, believed she was in her house. Able to reorient patient appropriately. Problem: Falls - Risk of  Goal: *Absence of Falls  Description: Document Jose Sylvester Fall Risk and appropriate interventions in the flowsheet. Outcome: Progressing Towards Goal  Note: Fall Risk Interventions:  Mobility Interventions: Bed/chair exit alarm,Communicate number of staff needed for ambulation/transfer,Patient to call before getting OOB    Mentation Interventions: Bed/chair exit alarm,More frequent rounding    Medication Interventions: Bed/chair exit alarm,Patient to call before getting OOB         History of Falls Interventions: Bed/chair exit alarm         Problem: Patient Education: Go to Patient Education Activity  Goal: Patient/Family Education  Outcome: Progressing Towards Goal     Problem: Pressure Injury - Risk of  Goal: *Prevention of pressure injury  Description: Document Chris Scale and appropriate interventions in the flowsheet.   Outcome: Progressing Towards Goal  Note: Pressure Injury Interventions:  Sensory Interventions: Assess changes in LOC,Check visual cues for pain,Pressure redistribution bed/mattress (bed type)    Moisture Interventions: Absorbent underpads,Internal/External urinary devices    Activity Interventions: Pressure redistribution bed/mattress(bed type)    Mobility Interventions: HOB 30 degrees or less    Nutrition Interventions: Document food/fluid/supplement intake    Friction and Shear Interventions: HOB 30 degrees or less                Problem: Patient Education: Go to Patient Education Activity  Goal: Patient/Family Education  Outcome: Progressing Towards Goal     Problem: Patient Education: Go to Patient Education Activity  Goal: Patient/Family Education  Outcome: Progressing Towards Goal     Problem: Patient Education: Go to Patient Education Activity  Goal: Patient/Family Education  Outcome: Progressing Towards Goal

## 2022-05-06 NOTE — PROGRESS NOTES
Hospitalist Progress Note    Subjective:   Daily Progress Note: 5/6/2022 9:20 AM    Hospital Course: Patient is a 66-year-old female with a history of CHF, type 2 diabetes, hypertension, CKD that presented to the emergency room on 5/1/2022 for back pain for approximately 2 weeks. Patient had a CT of the lumbar spine done on 4/27/2022 which revealed an L2 compression fracture that appeared acute. Vital signs and laboratory data unremarkable in the ED. It was recommended admission for possible IR evaluation and/or kyphoplasty. MRI of lumbar spine shows acute/early subacute compression fracture of L2 vertebral body, advanced degenerative facet joint disease at L4-L5 with grade 1 anterior spondylolisthesis L4 on L5, severe foraminal stenosis on the right with compression of the right L4 nerve within the neuroforamen, moderate foraminal stenosis at L2-L3 level. Patient went for a kyphoplasty on 5/4/2022. WBC increased. No Fever. UA/Culture pending      Subjective: Patient says she feels a little better. Denies any chest pain or cough. No dysuria or abdominal pain.     Current Facility-Administered Medications   Medication Dose Route Frequency    0.9% sodium chloride infusion  50 mL/hr IntraVENous CONTINUOUS    sodium chloride (NS) flush 5-40 mL  5-40 mL IntraVENous Q8H    sodium chloride (NS) flush 5-40 mL  5-40 mL IntraVENous PRN    acetaminophen (TYLENOL) tablet 650 mg  650 mg Oral Q6H PRN    Or    acetaminophen (TYLENOL) suppository 650 mg  650 mg Rectal Q6H PRN    polyethylene glycol (MIRALAX) packet 17 g  17 g Oral DAILY PRN    bisacodyL (DULCOLAX) tablet 5 mg  5 mg Oral DAILY PRN    ondansetron (ZOFRAN ODT) tablet 4 mg  4 mg Oral Q6H PRN    Or    ondansetron (ZOFRAN) injection 4 mg  4 mg IntraVENous Q6H PRN    famotidine (PEPCID) tablet 20 mg  20 mg Oral DAILY    glucose chewable tablet 16 g  4 Tablet Oral PRN    glucagon (GLUCAGEN) injection 1 mg  1 mg IntraMUSCular PRN    insulin lispro (HUMALOG) injection   SubCUTAneous AC&HS    DULoxetine (CYMBALTA) capsule 30 mg  30 mg Oral DAILY    cetirizine (ZYRTEC) tablet 10 mg  10 mg Oral DAILY    HYDROcodone-acetaminophen (NORCO) 5-325 mg per tablet 1 Tablet  1 Tablet Oral Q4H PRN    alogliptin (NESINA) tablet 6.25 mg  6.25 mg Oral DAILY    lisinopriL (PRINIVIL, ZESTRIL) tablet 20 mg  20 mg Oral DAILY    verapamil ER (VERELAN) capsule 240 mg  240 mg Oral DAILY WITH BREAKFAST    allopurinoL (ZYLOPRIM) tablet 300 mg  300 mg Oral DAILY    aspirin delayed-release tablet 81 mg  81 mg Oral DAILY    ferrous sulfate tablet 325 mg  325 mg Oral ACB    hydrALAZINE (APRESOLINE) tablet 50 mg  50 mg Oral BID    melatonin tablet 3 mg  3 mg Oral QHS    cholecalciferol (VITAMIN D3) (1000 Units /25 mcg) tablet 2,000 Units  2,000 Units Oral DAILY    furosemide (LASIX) tablet 40 mg  40 mg Oral DAILY        Review of Systems  Constitutional: No fevers, No chills, No sweats, +fatigue, ++ Weakness  Eyes: No redness  Ears, nose, mouth, throat, and face: No nasal congestion, No sore throat, No voice change  Respiratory: No Shortness of Breath, No cough, No wheezing  Cardiovascular: No chest pain, No palpitations, No extremity edema  Gastrointestinal: No nausea, No vomiting, No diarrhea, No abdominal pain  Genitourinary: No frequency, No dysuria, No hematuria  Integument/breast: No skin lesion(s)   Neurological: No Confusion, No headaches, No dizziness      Objective:     Visit Vitals  BP (!) 169/77 (BP 1 Location: Right upper arm)   Pulse 87   Temp 97.7 °F (36.5 °C)   Resp 18   Ht 5' 2\" (1.575 m)   Wt 69.9 kg (154 lb)   SpO2 92%   BMI 28.17 kg/m²      O2 Device: None (Room air)    Temp (24hrs), Av.1 °F (36.7 °C), Min:97.7 °F (36.5 °C), Max:98.4 °F (36.9 °C)      No intake/output data recorded.  1901 -  0700  In: -   Out: 375 [Urine:375]    PHYSICAL EXAM:  Constitutional: No acute distress  Skin: Extremities and face reveal no rashes. HEENT: Sclerae anicteric. Extra-occular muscles are intact. Cardiovascular: RRR  Respiratory:  nonlabored  GI: Abdomen nondistended, soft, and nontender. Normal active bowel sounds. Musculoskeletal: No pitting edema of the lower legs. Neurological:  Patient is alert and oriented.  Cranial nerves II-XII grossly intact  Psychiatric: Mood appears appropriate       Data Review    Recent Results (from the past 24 hour(s))   GLUCOSE, POC    Collection Time: 05/05/22 11:50 AM   Result Value Ref Range    Glucose (POC) 171 (H) 65 - 117 mg/dL    Performed by Rosa Prado    GLUCOSE, POC    Collection Time: 05/05/22  3:00 PM   Result Value Ref Range    Glucose (POC) 219 (H) 65 - 117 mg/dL    Performed by Rosa Prado    GLUCOSE, POC    Collection Time: 05/05/22  8:06 PM   Result Value Ref Range    Glucose (POC) 204 (H) 65 - 117 mg/dL    Performed by [facility]    METABOLIC PANEL, BASIC    Collection Time: 05/06/22  6:53 AM   Result Value Ref Range    Sodium 138 136 - 145 mmol/L    Potassium 4.1 3.5 - 5.1 mmol/L    Chloride 104 97 - 108 mmol/L    CO2 28 21 - 32 mmol/L    Anion gap 6 5 - 15 mmol/L    Glucose 194 (H) 65 - 100 mg/dL    BUN 52 (H) 6 - 20 mg/dL    Creatinine 2.30 (H) 0.55 - 1.02 mg/dL    BUN/Creatinine ratio 23 (H) 12 - 20      GFR est AA 24 (L) >60 ml/min/1.73m2    GFR est non-AA 20 (L) >60 ml/min/1.73m2    Calcium 9.2 8.5 - 10.1 mg/dL   CBC WITH AUTOMATED DIFF    Collection Time: 05/06/22  6:53 AM   Result Value Ref Range    WBC 12.2 (H) 3.6 - 11.0 K/uL    RBC 3.96 3.80 - 5.20 M/uL    HGB 11.9 11.5 - 16.0 g/dL    HCT 36.6 35.0 - 47.0 %    MCV 92.4 80.0 - 99.0 FL    MCH 30.1 26.0 - 34.0 PG    MCHC 32.5 30.0 - 36.5 g/dL    RDW 13.8 11.5 - 14.5 %    PLATELET 979 505 - 172 K/uL    MPV 9.4 8.9 - 12.9 FL    NRBC 0.0 0.0  WBC    ABSOLUTE NRBC 0.00 0.00 - 0.01 K/uL    NEUTROPHILS 71 32 - 75 %    LYMPHOCYTES 12 12 - 49 %    MONOCYTES 10 5 - 13 %    EOSINOPHILS 5 0 - 7 %    BASOPHILS 1 0 - 1 %    IMMATURE GRANULOCYTES 1 (H) 0 - 0.5 %    ABS. NEUTROPHILS 8.7 (H) 1.8 - 8.0 K/UL    ABS. LYMPHOCYTES 1.5 0.8 - 3.5 K/UL    ABS. MONOCYTES 1.3 (H) 0.0 - 1.0 K/UL    ABS. EOSINOPHILS 0.6 (H) 0.0 - 0.4 K/UL    ABS. BASOPHILS 0.1 0.0 - 0.1 K/UL    ABS. IMM. GRANS. 0.1 (H) 0.00 - 0.04 K/UL    DF AUTOMATED     GLUCOSE, POC    Collection Time: 05/06/22  7:29 AM   Result Value Ref Range    Glucose (POC) 191 (H) 65 - 117 mg/dL    Performed by Stas Dickerson        Assessment:   1. L2 compression fracture s/p kyphoplasty #2  2. Hypertension  3. CHF  4. Type 2 diabetes  5. CKD  6. Leukocytosis     Plan:    1.  CT of the cervical spine on 4/27/2022 shows signs concerning for L2 compression fracture. Unable to locate these results as done at an outside facility. IR consulted. Patient on Norco and Cymbalta. MRI reviewed. Kyphoplasty at 12 30-1 today with interventional radiology and anesthesiology. PT and OT postop  2. Blood pressure is stable. Continue to monitor per unit protocol. Patient on Lasix, hydralazine, lisinopril, verapamil. 3.  Patient is on aspirin and diuretic. No signs of fluid overload  4. Blood glucose levels are stable. On insulin sliding scale. On alogliptin. 5.  Renal function is elevated. Hold nephrotoxic medications. 6.  WBC increased. No SOB or cough. Will check a UA/Urine culture  7. CBC and BMP in the AM     Dispo: 24-48 hours. Barriers UA results and bed availability to SNF     CODE STATUS full     DVT prophylaxis: SCDs  Ulcer prophylaxis: Parmova 70 discussed with: Patient/Family, Nurse and     Total time spent with patient: 33 minutes.

## 2022-05-06 NOTE — PROGRESS NOTES
RN called report to Nurse Jayson Benavides at [facility]. Nurse verbalized understanding of discharge instructions, and all questions were answered. Pt to discharge at 1630pm. Discharge plan of care/case management plan validated with provider discharge order.     Mag La RN

## 2022-05-06 NOTE — PROGRESS NOTES
PHYSICAL THERAPY TREATMENT  Patient: Sebastian Patel (50 y.o. female)  Date: 5/6/2022  Diagnosis: Compression fracture of L2 ([address] [S32.020A]  Compression fracture of L2 lumbar vertebra ([address] [S32.020A] <principal problem not specified>       Precautions:    Chart, physical therapy assessment, plan of care and goals were reviewed. ASSESSMENT  Patient continues with skilled PT services and is progressing towards goals. Patient supine in bed upon approach and agreed to therapy today. Patient was AXO x3 ( disoriented to place). Patient could not recall spinal precautions. Patient was re educated on spinal precautions and verbalized understanding. Patient was min A for bed mobility, min A for supine> sit, and SBA with additional time for scooting to EOB. Patient demonstrated good unsupported static balance while sitting EOB. Patient then performed STS to RW at [facility]  And ambulated with RW and CGA for 20 feet inside room. Patient had slow gait with shortened step length on SACHIN sides but had no  LOB or knee buckling. Patient sat in bedside chair and was set up for breakfast. Patient left seated in chair with call bell within reach and all needs meet. Current Level of Function Impacting Discharge (mobility/balance): impaired balance, general weakness. Other factors to consider for discharge: PLOF, assistance at home, level of deficits, pain control, acute medical state         PLAN :  Patient continues to benefit from skilled intervention to address the above impairments. Continue treatment per established plan of care. to address goals.     Recommendation for discharge: (in order for the patient to meet his/her long term goals)  Oskar Vasquez    This discharge recommendation:  Has been made in collaboration with the attending provider and/or case management    IF patient discharges home will need the following DME: gait belt and rolling walker       SUBJECTIVE:   Patient stated Paola Owen would like to sit up in the chair for breakfast.    OBJECTIVE DATA SUMMARY:   Critical Behavior:  Neurologic State: Alert  Orientation Level: Disoriented to place  Cognition: Follows commands     Functional Mobility Training:  Bed Mobility:  Rolling: Minimum assistance  Supine to Sit: Minimum assistance  Scooting: Stand-by assistance; Additional time  Transfers:  Sit to Stand: Stand-by assistance  Stand to Sit: Contact guard assistance  Balance:  Sitting: Impaired; Without support  Sitting - Static: Good (unsupported)  Sitting - Dynamic: Fair (occasional)  Standing: Impaired; With support  Standing - Static: Constant support; Fair  Standing - Dynamic : Constant support; Fair  Ambulation/Gait Training:  Distance (ft): 20 Feet (ft)  Assistive Device: Gait belt;Walker, rolling  Ambulation - Level of Assistance: Contact guard assistance; Additional time  Speed/Mary: Pace decreased (<100 feet/min); Slow  Step Length: Left shortened;Right shortened  Pain Ratin-9/10 in lower back     Activity Tolerance:   Fair  Please refer to the flowsheet for vital signs taken during this treatment. After treatment patient left in no apparent distress:   Sitting in chair and Call bell within reach    COMMUNICATION/COLLABORATION:   The patients plan of care was discussed with: Registered nurse. Problem: Mobility Impaired (Adult and Pediatric)  Goal: *Acute Goals and Plan of Care (Insert Text)  Description: Pt will be I with LE HEP in 7 days. Pt will perform bed mobility with mod I in 7 days. Pt will perform transfers with mod I in 7 days. Pt will amb >150 feet with LRAD safely with mod I in 7 days.    Outcome: Progressing Towards Goal       Godfrey Gloss, PTA   Time Calculation: 30 mins

## 2022-05-06 NOTE — PROGRESS NOTES
Problem: Falls - Risk of  Goal: *Absence of Falls  Description: Document Jonnathan Vaughn Fall Risk and appropriate interventions in the flowsheet. Outcome: Progressing Towards Goal  Note: Fall Risk Interventions:  Mobility Interventions: Bed/chair exit alarm,Communicate number of staff needed for ambulation/transfer,Patient to call before getting OOB    Mentation Interventions: Adequate sleep, hydration, pain control,Bed/chair exit alarm    Medication Interventions: Bed/chair exit alarm,Patient to call before getting OOB    Elimination Interventions: Bed/chair exit alarm,Call light in reach    History of Falls Interventions: Bed/chair exit alarm         Problem: Patient Education: Go to Patient Education Activity  Goal: Patient/Family Education  Outcome: Progressing Towards Goal     Problem: Pressure Injury - Risk of  Goal: *Prevention of pressure injury  Description: Document Chris Scale and appropriate interventions in the flowsheet.   Outcome: Progressing Towards Goal  Note: Pressure Injury Interventions:  Sensory Interventions: Assess changes in LOC    Moisture Interventions: Absorbent underpads,Apply protective barrier, creams and emollients    Activity Interventions: Pressure redistribution bed/mattress(bed type)    Mobility Interventions: HOB 30 degrees or less    Nutrition Interventions: Document food/fluid/supplement intake    Friction and Shear Interventions: HOB 30 degrees or less                Problem: Patient Education: Go to Patient Education Activity  Goal: Patient/Family Education  Outcome: Progressing Towards Goal     Problem: Patient Education: Go to Patient Education Activity  Goal: Patient/Family Education  Outcome: Progressing Towards Goal     Problem: Patient Education: Go to Patient Education Activity  Goal: Patient/Family Education  Outcome: Progressing Towards Goal

## 2022-05-06 NOTE — PROGRESS NOTES
Called [employer] [facility] & set up transportation for patient to  Go to [address] [place] [address] and Rehab for 16:30 pm. Nurse Jace and Discharge Nurse [facility] notified.

## 2022-05-06 NOTE — DISCHARGE SUMMARY
Hospitalist Discharge Summary     Patient ID:    Myer Olszewski  [id number]  50 y.o.  2/17/1932    Admit date: 5/1/2022    Discharge date : 5/6/2022    Chronic Diagnoses:    Problem List as of 5/6/2022 Never Reviewed          Codes Class Noted - Resolved    Compression fracture of L2 lumbar vertebra ([facility] ICD-10-CM: Y25.804B  ICD-9-CM: 805.4  5/2/2022 - Present        Compression fracture of L2 ([facility] ICD-10-CM: E97.024E  ICD-9-CM: 805.4  5/1/2022 - Present          22    Final Diagnoses:   1. L2 compression fracture s/p kyphoplasty #2  2. Hypertension  3. CHF  4. Type 2 diabetes  5. CKD  6. Leukocytosis/Reactive     Reason for Hospitalization: Back pain      Hospital Course: Patient is a 51-year-old female with a history of CHF, type 2 diabetes, hypertension, CKD that presented to the emergency room on 5/1/2022 for back pain for approximately 2 weeks. Patient had a CT of the lumbar spine done on 4/27/2022 which revealed an L2 compression fracture that appeared acute. Vital signs and laboratory data unremarkable in the ED. It was recommended admission for possible IR evaluation and/or kyphoplasty. MRI of lumbar spine shows acute/early subacute compression fracture of L2 vertebral body, advanced degenerative facet joint disease at L4-L5 with grade 1 anterior spondylolisthesis L4 on L5, severe foraminal stenosis on the right with compression of the right L4 nerve within the neuroforamen, moderate foraminal stenosis at L2-L3 level. Patient went for a kyphoplasty on 5/4/2022. WBC increased. No Fever. UA negative. Urine culture pending. Denies any SOB, cough, chest pain. Not requiring supplemented oxygen. WBC most likely reactive. Recommend to hold antibiotics at this time. Follow up with blood work at [facility].  [place] stable for discharge to SNF      Discharge Medications:   Current Discharge Medication List      CONTINUE these medications which have NOT CHANGED    Details   fexofenadine (ALLEGRA) 180 mg tablet       HYDROcodone-acetaminophen (NORCO) 5-325 mg per tablet       Lantus Solostar U-100 Insulin 100 unit/mL (3 mL) inpn       lisinopriL (PRINIVIL, ZESTRIL) 20 mg tablet       Januvia 50 mg tablet       verapamil ER (CALAN-SR) 240 mg CR tablet       hydrALAZINE (APRESOLINE) 50 mg tablet Take 50 mg by mouth two (2) times a day. ferrous sulfate 325 mg (65 mg iron) tablet Take 325 mg by mouth Daily (before breakfast). aspirin delayed-release 81 mg tablet Take 81 mg by mouth daily. allopurinoL (ZYLOPRIM) 300 mg tablet Take 300 mg by mouth daily. melatonin 3 mg tablet Take 3 mg by mouth nightly. furosemide (Lasix) 40 mg tablet Take 40 mg by mouth daily. cholecalciferol, vitamin D3, (Vitamin D3) 50 mcg (2,000 unit) tab Take 1 Tablet by mouth daily. DULoxetine (CYMBALTA) 60 mg capsule                Follow up Care:    1. Beau Bragg MD in 1-2 weeks. Follow-up Information     Follow up With Specialties Details Why Contact Info    Beau Bragg MD Family Medicine Schedule an appointment as soon as possible for a visit  [address] [place] [address]  [phone]              Patient Follow Up Instructions: Activity: Activity as tolerated  Diet:  Cardiac Diet  Wound care: None Needed    Condition at Discharge:  Stable  __________________________________________________________________    Disposition  [facility]  ____________________________________________________________________    Code Status: Full Code  ___________________________________________________________________    Discharge Exam:  Patient seen and examined by me on discharge day. Pertinent Findings:  Gen:    Not in distress  Chest: Clear lungs  CVS:   Regular rhythm. No edema  Abd:  Soft, not distended, not tender  Neuro:  Alert    CONSULTATIONS: IR    Significant Diagnostic Studies:   5/1/2022: BUN 37 mg/dL (H; Ref range: 6 - 20 mg/dL);  Calcium 9.0 mg/dL (Ref range: 8.5 - 10.1 mg/dL); CO2 28 mmol/L (Ref range: 21 - 32 mmol/L); Creatinine 2.02 mg/dL (H; Ref range: 0.55 - 1.02 mg/dL); Glucose 167 mg/dL (H; Ref range: 65 - 100 mg/dL); HCT 37.4 % (Ref range: 35.0 - 47.0 %); HGB 11.7 g/dL (Ref range: 11.5 - 16.0 g/dL); Potassium 3.8 mmol/L (Ref range: 3.5 - 5.1 mmol/L); Sodium 137 mmol/L (Ref range: 136 - 145 mmol/L)  5/2/2022: BUN 30 mg/dL (H; Ref range: 6 - 20 mg/dL); Calcium 8.7 mg/dL (Ref range: 8.5 - 10.1 mg/dL); CO2 29 mmol/L (Ref range: 21 - 32 mmol/L); Creatinine 1.55 mg/dL (H; Ref range: 0.55 - 1.02 mg/dL); Glucose 112 mg/dL (H; Ref range: 65 - 100 mg/dL); HCT 36.4 % (Ref range: 35.0 - 47.0 %); HGB 11.6 g/dL (Ref range: 11.5 - 16.0 g/dL); Potassium 4.0 mmol/L (Ref range: 3.5 - 5.1 mmol/L); Sodium 140 mmol/L (Ref range: 136 - 145 mmol/L)  Recent Labs     05/06/22  0653 05/05/22  0723   WBC 12.2* 11.6*   HGB 11.9 12.0   HCT 36.6 37.2    334     Recent Labs     05/06/22  0653 05/05/22  0723    137   K 4.1 4.6    102   CO2 28 26   BUN 52* 42*   CREA 2.30* 1.97*   * 278*   CA 9.2 9.2     No results for input(s): ALT, AP, TBIL, TBILI, TP, ALB, GLOB, GGT, AML, LPSE in the last 72 hours. No lab exists for component: SGOT, GPT, AMYP, HLPSE  Recent Labs     05/03/22  1731   INR 1.0   PTP 13.0      No results for input(s): FE, TIBC, PSAT, FERR in the last 72 hours. No results for input(s): PH, PCO2, PO2 in the last 72 hours. No results for input(s): CPK, CKMB in the last 72 hours.     No lab exists for component: TROPONINI  Lab Results   Component Value Date/Time    Glucose (POC) 240 (H) 05/06/2022 11:11 AM    Glucose (POC) 191 (H) 05/06/2022 07:29 AM    Glucose (POC) 204 (H) 05/05/2022 08:06 PM    Glucose (POC) 219 (H) 05/05/2022 03:00 PM    Glucose (POC) 171 (H) 05/05/2022 11:50 AM         Total Time: >30 min     Signed:  Chacho Short PA-C  5/6/2022  1:28 PM

## 2022-05-07 LAB
BACTERIA SPEC CULT: NORMAL
SPECIAL REQUESTS,SREQ: NORMAL

## 2022-09-19 ENCOUNTER — APPOINTMENT (OUTPATIENT)
Dept: GENERAL RADIOLOGY | Age: 87
DRG: 481 | End: 2022-09-19
Attending: ORTHOPAEDIC SURGERY
Payer: MEDICARE

## 2022-09-19 ENCOUNTER — APPOINTMENT (OUTPATIENT)
Dept: GENERAL RADIOLOGY | Age: 87
DRG: 481 | End: 2022-09-19
Attending: EMERGENCY MEDICINE
Payer: MEDICARE

## 2022-09-19 ENCOUNTER — ANESTHESIA EVENT (OUTPATIENT)
Dept: SURGERY | Age: 87
DRG: 481 | End: 2022-09-19
Payer: MEDICARE

## 2022-09-19 ENCOUNTER — APPOINTMENT (OUTPATIENT)
Dept: NON INVASIVE DIAGNOSTICS | Age: 87
DRG: 481 | End: 2022-09-19
Attending: PHYSICIAN ASSISTANT
Payer: MEDICARE

## 2022-09-19 ENCOUNTER — HOSPITAL ENCOUNTER (INPATIENT)
Age: 87
LOS: 2 days | Discharge: NURSING, REHAB OR LONG-TERM CARE | DRG: 481 | End: 2022-09-21
Attending: EMERGENCY MEDICINE | Admitting: HOSPITALIST
Payer: MEDICARE

## 2022-09-19 ENCOUNTER — ANESTHESIA (OUTPATIENT)
Dept: SURGERY | Age: 87
DRG: 481 | End: 2022-09-19
Payer: MEDICARE

## 2022-09-19 DIAGNOSIS — S72.002A CLOSED FRACTURE OF LEFT HIP, INITIAL ENCOUNTER (HCC): Primary | ICD-10-CM

## 2022-09-19 PROBLEM — S72.142A CLOSED INTERTROCHANTERIC FRACTURE OF LEFT FEMUR (HCC): Status: ACTIVE | Noted: 2022-09-19

## 2022-09-19 PROBLEM — S72.009A HIP FRACTURE (HCC): Status: ACTIVE | Noted: 2022-09-19

## 2022-09-19 LAB
ABO + RH BLD: NORMAL
ALBUMIN SERPL-MCNC: 2.8 G/DL (ref 3.5–5)
ALBUMIN SERPL-MCNC: 2.8 G/DL (ref 3.5–5)
ALBUMIN/GLOB SERPL: 0.9 {RATIO} (ref 1.1–2.2)
ALP SERPL-CCNC: 67 U/L (ref 45–117)
ALT SERPL-CCNC: 13 U/L (ref 12–78)
ANION GAP SERPL CALC-SCNC: 7 MMOL/L (ref 5–15)
ANION GAP SERPL CALC-SCNC: 8 MMOL/L (ref 5–15)
APPEARANCE UR: CLEAR
AST SERPL W P-5'-P-CCNC: ABNORMAL U/L (ref 15–37)
ATRIAL RATE: 107 BPM
ATRIAL RATE: 94 BPM
BACTERIA URNS QL MICRO: NEGATIVE /HPF
BASOPHILS # BLD: 0.1 K/UL (ref 0–0.1)
BASOPHILS NFR BLD: 0 % (ref 0–1)
BILIRUB SERPL-MCNC: 0.3 MG/DL (ref 0.2–1)
BILIRUB UR QL: NEGATIVE
BLOOD GROUP ANTIBODIES SERPL: NEGATIVE
BUN SERPL-MCNC: 22 MG/DL (ref 6–20)
BUN SERPL-MCNC: 23 MG/DL (ref 6–20)
BUN/CREAT SERPL: 13 (ref 12–20)
BUN/CREAT SERPL: 14 (ref 12–20)
CA-I BLD-MCNC: 8.8 MG/DL (ref 8.5–10.1)
CA-I BLD-MCNC: 8.8 MG/DL (ref 8.5–10.1)
CALCULATED P AXIS, ECG09: 58 DEGREES
CALCULATED P AXIS, ECG09: 66 DEGREES
CALCULATED R AXIS, ECG10: -57 DEGREES
CALCULATED R AXIS, ECG10: -59 DEGREES
CALCULATED T AXIS, ECG11: 10 DEGREES
CALCULATED T AXIS, ECG11: 14 DEGREES
CHLORIDE SERPL-SCNC: 107 MMOL/L (ref 97–108)
CHLORIDE SERPL-SCNC: 107 MMOL/L (ref 97–108)
CO2 SERPL-SCNC: 25 MMOL/L (ref 21–32)
CO2 SERPL-SCNC: 27 MMOL/L (ref 21–32)
COLOR UR: ABNORMAL
CREAT SERPL-MCNC: 1.64 MG/DL (ref 0.55–1.02)
CREAT SERPL-MCNC: 1.64 MG/DL (ref 0.55–1.02)
DIAGNOSIS, 93000: NORMAL
DIAGNOSIS, 93000: NORMAL
DIFFERENTIAL METHOD BLD: ABNORMAL
ECHO AO ROOT DIAM: 3.1 CM
ECHO AO ROOT INDEX: 1.4 CM/M2
ECHO AV PEAK GRADIENT: 4 MMHG
ECHO AV PEAK VELOCITY: 1 M/S
ECHO AV VELOCITY RATIO: 0.9
ECHO EST RA PRESSURE: 3 MMHG
ECHO LA DIAMETER INDEX: 0.9 CM/M2
ECHO LA DIAMETER: 2 CM
ECHO LA TO AORTIC ROOT RATIO: 0.65
ECHO LV E' LATERAL VELOCITY: 8 CM/S
ECHO LV E' SEPTAL VELOCITY: 5 CM/S
ECHO LV EJECTION FRACTION BIPLANE: 65 % (ref 55–100)
ECHO LV FRACTIONAL SHORTENING: 34 % (ref 28–44)
ECHO LV INTERNAL DIMENSION DIASTOLE INDEX: 1.58 CM/M2
ECHO LV INTERNAL DIMENSION DIASTOLIC: 3.5 CM (ref 3.9–5.3)
ECHO LV INTERNAL DIMENSION SYSTOLIC INDEX: 1.04 CM/M2
ECHO LV INTERNAL DIMENSION SYSTOLIC: 2.3 CM
ECHO LV IVSD: 1.1 CM (ref 0.6–0.9)
ECHO LV MASS 2D: 95.9 G (ref 67–162)
ECHO LV MASS INDEX 2D: 43.2 G/M2 (ref 43–95)
ECHO LV POSTERIOR WALL DIASTOLIC: 0.8 CM (ref 0.6–0.9)
ECHO LV RELATIVE WALL THICKNESS RATIO: 0.46
ECHO LVOT PEAK GRADIENT: 3 MMHG
ECHO LVOT PEAK VELOCITY: 0.9 M/S
ECHO MV A VELOCITY: 0.8 M/S
ECHO MV E DECELERATION TIME (DT): 102 MS
ECHO MV E VELOCITY: 0.84 M/S
ECHO MV E/A RATIO: 1.05
ECHO MV E/E' LATERAL: 10.5
ECHO MV E/E' RATIO (AVERAGED): 13.65
ECHO MV E/E' SEPTAL: 16.8
ECHO PV MAX VELOCITY: 0.9 M/S
ECHO PV PEAK GRADIENT: 3 MMHG
ECHO RIGHT VENTRICULAR SYSTOLIC PRESSURE (RVSP): 29 MMHG
ECHO RV INTERNAL DIMENSION: 2.7 CM
ECHO TV REGURGITANT MAX VELOCITY: 2.53 M/S
ECHO TV REGURGITANT PEAK GRADIENT: 26 MMHG
EOSINOPHIL # BLD: 0.3 K/UL (ref 0–0.4)
EOSINOPHIL NFR BLD: 2 % (ref 0–7)
ERYTHROCYTE [DISTWIDTH] IN BLOOD BY AUTOMATED COUNT: 14.8 % (ref 11.5–14.5)
GLOBULIN SER CALC-MCNC: 3.2 G/DL (ref 2–4)
GLUCOSE BLD STRIP.AUTO-MCNC: 118 MG/DL (ref 65–100)
GLUCOSE BLD STRIP.AUTO-MCNC: 131 MG/DL (ref 65–100)
GLUCOSE BLD STRIP.AUTO-MCNC: 146 MG/DL (ref 65–100)
GLUCOSE BLD STRIP.AUTO-MCNC: 156 MG/DL (ref 65–100)
GLUCOSE BLD STRIP.AUTO-MCNC: 190 MG/DL (ref 65–100)
GLUCOSE SERPL-MCNC: 202 MG/DL (ref 65–100)
GLUCOSE SERPL-MCNC: 204 MG/DL (ref 65–100)
GLUCOSE UR STRIP.AUTO-MCNC: NEGATIVE MG/DL
HCT VFR BLD AUTO: 35.5 % (ref 35–47)
HGB BLD-MCNC: 11.5 G/DL (ref 11.5–16)
HGB UR QL STRIP: ABNORMAL
IMM GRANULOCYTES # BLD AUTO: 0.1 K/UL (ref 0–0.04)
IMM GRANULOCYTES NFR BLD AUTO: 1 % (ref 0–0.5)
INR PPP: 1.1 (ref 0.9–1.1)
KETONES UR QL STRIP.AUTO: NEGATIVE MG/DL
LEUKOCYTE ESTERASE UR QL STRIP.AUTO: NEGATIVE
LYMPHOCYTES # BLD: 1.5 K/UL (ref 0.8–3.5)
LYMPHOCYTES NFR BLD: 11 % (ref 12–49)
MAGNESIUM SERPL-MCNC: NORMAL MG/DL (ref 1.6–2.4)
MAGNESIUM SERPL-MCNC: NORMAL MG/DL (ref 1.6–2.4)
MCH RBC QN AUTO: 30.4 PG (ref 26–34)
MCHC RBC AUTO-ENTMCNC: 32.4 G/DL (ref 30–36.5)
MCV RBC AUTO: 93.9 FL (ref 80–99)
MONOCYTES # BLD: 0.7 K/UL (ref 0–1)
MONOCYTES NFR BLD: 5 % (ref 5–13)
MRSA DNA SPEC QL NAA+PROBE: NOT DETECTED
NEUTS SEG # BLD: 11.3 K/UL (ref 1.8–8)
NEUTS SEG NFR BLD: 81 % (ref 32–75)
NITRITE UR QL STRIP.AUTO: NEGATIVE
NRBC # BLD: 0 K/UL (ref 0–0.01)
NRBC BLD-RTO: 0 PER 100 WBC
P-R INTERVAL, ECG05: 164 MS
P-R INTERVAL, ECG05: 168 MS
PERFORMED BY, TECHID: ABNORMAL
PH UR STRIP: 5 [PH]
PHOSPHATE SERPL-MCNC: 3.9 MG/DL (ref 2.6–4.7)
PLATELET # BLD AUTO: 243 K/UL (ref 150–400)
PMV BLD AUTO: 9.9 FL (ref 8.9–12.9)
POTASSIUM SERPL-SCNC: 4.4 MMOL/L (ref 3.5–5.1)
POTASSIUM SERPL-SCNC: ABNORMAL MMOL/L (ref 3.5–5.1)
POTASSIUM SERPL-SCNC: NORMAL MMOL/L (ref 3.5–5.1)
PROT SERPL-MCNC: 6 G/DL (ref 6.4–8.2)
PROT UR STRIP-MCNC: NEGATIVE MG/DL
PROTHROMBIN TIME: 13.9 SEC (ref 11.9–14.6)
Q-T INTERVAL, ECG07: 358 MS
Q-T INTERVAL, ECG07: 402 MS
QRS DURATION, ECG06: 118 MS
QRS DURATION, ECG06: 118 MS
QTC CALCULATION (BEZET), ECG08: 477 MS
QTC CALCULATION (BEZET), ECG08: 502 MS
RBC # BLD AUTO: 3.78 M/UL (ref 3.8–5.2)
RBC #/AREA URNS HPF: ABNORMAL /HPF (ref 0–5)
SODIUM SERPL-SCNC: 140 MMOL/L (ref 136–145)
SODIUM SERPL-SCNC: 141 MMOL/L (ref 136–145)
SP GR UR REFRACTOMETRY: 1.01 (ref 1–1.03)
SPECIMEN EXP DATE BLD: NORMAL
TSH SERPL DL<=0.05 MIU/L-ACNC: 1.31 UIU/ML (ref 0.36–3.74)
UROBILINOGEN UR QL STRIP.AUTO: 0.1 EU/DL (ref 0.2–1)
VENTRICULAR RATE, ECG03: 107 BPM
VENTRICULAR RATE, ECG03: 94 BPM
WBC # BLD AUTO: 14 K/UL (ref 3.6–11)
WBC URNS QL MICRO: ABNORMAL /HPF (ref 0–4)

## 2022-09-19 PROCEDURE — 77030006674 HC BLD MYRIN GYRS -B: Performed by: ORTHOPAEDIC SURGERY

## 2022-09-19 PROCEDURE — 76060000034 HC ANESTHESIA 1.5 TO 2 HR: Performed by: ORTHOPAEDIC SURGERY

## 2022-09-19 PROCEDURE — 74011250637 HC RX REV CODE- 250/637: Performed by: PHYSICIAN ASSISTANT

## 2022-09-19 PROCEDURE — 96374 THER/PROPH/DIAG INJ IV PUSH: CPT

## 2022-09-19 PROCEDURE — 84443 ASSAY THYROID STIM HORMONE: CPT

## 2022-09-19 PROCEDURE — 74011000250 HC RX REV CODE- 250: Performed by: ANESTHESIOLOGY

## 2022-09-19 PROCEDURE — 74011250636 HC RX REV CODE- 250/636: Performed by: ORTHOPAEDIC SURGERY

## 2022-09-19 PROCEDURE — 74011636637 HC RX REV CODE- 636/637: Performed by: HOSPITALIST

## 2022-09-19 PROCEDURE — 76000 FLUOROSCOPY <1 HR PHYS/QHP: CPT

## 2022-09-19 PROCEDURE — 77030016474 HC BIT DRL QC3 SYNT -C: Performed by: ORTHOPAEDIC SURGERY

## 2022-09-19 PROCEDURE — 87186 SC STD MICRODIL/AGAR DIL: CPT

## 2022-09-19 PROCEDURE — 86900 BLOOD TYPING SEROLOGIC ABO: CPT

## 2022-09-19 PROCEDURE — C1713 ANCHOR/SCREW BN/BN,TIS/BN: HCPCS | Performed by: ORTHOPAEDIC SURGERY

## 2022-09-19 PROCEDURE — 82962 GLUCOSE BLOOD TEST: CPT

## 2022-09-19 PROCEDURE — 71045 X-RAY EXAM CHEST 1 VIEW: CPT

## 2022-09-19 PROCEDURE — C1769 GUIDE WIRE: HCPCS | Performed by: ORTHOPAEDIC SURGERY

## 2022-09-19 PROCEDURE — 93005 ELECTROCARDIOGRAM TRACING: CPT

## 2022-09-19 PROCEDURE — 80069 RENAL FUNCTION PANEL: CPT

## 2022-09-19 PROCEDURE — 77030031139 HC SUT VCRL2 J&J -A: Performed by: ORTHOPAEDIC SURGERY

## 2022-09-19 PROCEDURE — 65270000029 HC RM PRIVATE

## 2022-09-19 PROCEDURE — 74011250636 HC RX REV CODE- 250/636: Performed by: EMERGENCY MEDICINE

## 2022-09-19 PROCEDURE — 74011250636 HC RX REV CODE- 250/636: Performed by: HOSPITALIST

## 2022-09-19 PROCEDURE — 87086 URINE CULTURE/COLONY COUNT: CPT

## 2022-09-19 PROCEDURE — 85025 COMPLETE CBC W/AUTO DIFF WBC: CPT

## 2022-09-19 PROCEDURE — 3E0T3BZ INTRODUCTION OF ANESTHETIC AGENT INTO PERIPHERAL NERVES AND PLEXI, PERCUTANEOUS APPROACH: ICD-10-PCS | Performed by: ORTHOPAEDIC SURGERY

## 2022-09-19 PROCEDURE — 74011000250 HC RX REV CODE- 250: Performed by: ORTHOPAEDIC SURGERY

## 2022-09-19 PROCEDURE — 74011250637 HC RX REV CODE- 250/637: Performed by: INTERNAL MEDICINE

## 2022-09-19 PROCEDURE — 93010 ELECTROCARDIOGRAM REPORT: CPT | Performed by: INTERNAL MEDICINE

## 2022-09-19 PROCEDURE — 81001 URINALYSIS AUTO W/SCOPE: CPT

## 2022-09-19 PROCEDURE — 87077 CULTURE AEROBIC IDENTIFY: CPT

## 2022-09-19 PROCEDURE — 74011636637 HC RX REV CODE- 636/637: Performed by: PHYSICIAN ASSISTANT

## 2022-09-19 PROCEDURE — 99285 EMERGENCY DEPT VISIT HI MDM: CPT

## 2022-09-19 PROCEDURE — 0QS736Z REPOSITION LEFT UPPER FEMUR WITH INTRAMEDULLARY INTERNAL FIXATION DEVICE, PERCUTANEOUS APPROACH: ICD-10-PCS | Performed by: ORTHOPAEDIC SURGERY

## 2022-09-19 PROCEDURE — 87641 MR-STAPH DNA AMP PROBE: CPT

## 2022-09-19 PROCEDURE — 76210000006 HC OR PH I REC 0.5 TO 1 HR: Performed by: ORTHOPAEDIC SURGERY

## 2022-09-19 PROCEDURE — 76010000153 HC OR TIME 1.5 TO 2 HR: Performed by: ORTHOPAEDIC SURGERY

## 2022-09-19 PROCEDURE — 85610 PROTHROMBIN TIME: CPT

## 2022-09-19 PROCEDURE — 83735 ASSAY OF MAGNESIUM: CPT

## 2022-09-19 PROCEDURE — 84132 ASSAY OF SERUM POTASSIUM: CPT

## 2022-09-19 PROCEDURE — 74011000258 HC RX REV CODE- 258: Performed by: NURSE ANESTHETIST, CERTIFIED REGISTERED

## 2022-09-19 PROCEDURE — 74011000250 HC RX REV CODE- 250: Performed by: EMERGENCY MEDICINE

## 2022-09-19 PROCEDURE — 74011250636 HC RX REV CODE- 250/636: Performed by: NURSE ANESTHETIST, CERTIFIED REGISTERED

## 2022-09-19 PROCEDURE — 74011000250 HC RX REV CODE- 250: Performed by: HOSPITALIST

## 2022-09-19 PROCEDURE — 73502 X-RAY EXAM HIP UNI 2-3 VIEWS: CPT

## 2022-09-19 PROCEDURE — 93306 TTE W/DOPPLER COMPLETE: CPT

## 2022-09-19 PROCEDURE — 74011250637 HC RX REV CODE- 250/637: Performed by: ORTHOPAEDIC SURGERY

## 2022-09-19 PROCEDURE — 2709999900 HC NON-CHARGEABLE SUPPLY: Performed by: ORTHOPAEDIC SURGERY

## 2022-09-19 PROCEDURE — 74011000250 HC RX REV CODE- 250: Performed by: NURSE ANESTHETIST, CERTIFIED REGISTERED

## 2022-09-19 DEVICE — IMPLANTABLE DEVICE: Type: IMPLANTABLE DEVICE | Site: HIP | Status: FUNCTIONAL

## 2022-09-19 DEVICE — SCREW BNE L38MM DIA5MM NONSTERILE TIB LT GRN TI ST CANN LOK: Type: IMPLANTABLE DEVICE | Site: HIP | Status: FUNCTIONAL

## 2022-09-19 RX ORDER — SODIUM CHLORIDE 0.9 % (FLUSH) 0.9 %
5-40 SYRINGE (ML) INJECTION AS NEEDED
Status: DISCONTINUED | OUTPATIENT
Start: 2022-09-19 | End: 2022-09-19

## 2022-09-19 RX ORDER — METOPROLOL TARTRATE 25 MG/1
12.5 TABLET, FILM COATED ORAL EVERY 12 HOURS
Status: DISCONTINUED | OUTPATIENT
Start: 2022-09-19 | End: 2022-09-21

## 2022-09-19 RX ORDER — HYDROMORPHONE HYDROCHLORIDE 1 MG/ML
0.5 INJECTION, SOLUTION INTRAMUSCULAR; INTRAVENOUS; SUBCUTANEOUS
Status: DISCONTINUED | OUTPATIENT
Start: 2022-09-19 | End: 2022-09-21 | Stop reason: HOSPADM

## 2022-09-19 RX ORDER — SODIUM CHLORIDE 0.9 % (FLUSH) 0.9 %
5-40 SYRINGE (ML) INJECTION EVERY 8 HOURS
Status: DISCONTINUED | OUTPATIENT
Start: 2022-09-19 | End: 2022-09-21 | Stop reason: HOSPADM

## 2022-09-19 RX ORDER — BUPIVACAINE HYDROCHLORIDE 2.5 MG/ML
INJECTION, SOLUTION EPIDURAL; INFILTRATION; INTRACAUDAL AS NEEDED
Status: DISCONTINUED | OUTPATIENT
Start: 2022-09-19 | End: 2022-09-19 | Stop reason: HOSPADM

## 2022-09-19 RX ORDER — LIDOCAINE HYDROCHLORIDE 20 MG/ML
INJECTION, SOLUTION EPIDURAL; INFILTRATION; INTRACAUDAL; PERINEURAL AS NEEDED
Status: DISCONTINUED | OUTPATIENT
Start: 2022-09-19 | End: 2022-09-19 | Stop reason: HOSPADM

## 2022-09-19 RX ORDER — ALOGLIPTIN 25 MG/1
12.5 TABLET, FILM COATED ORAL DAILY
Status: DISCONTINUED | OUTPATIENT
Start: 2022-09-19 | End: 2022-09-21 | Stop reason: HOSPADM

## 2022-09-19 RX ORDER — NORETHINDRONE AND ETHINYL ESTRADIOL 0.5-0.035
5 KIT ORAL AS NEEDED
Status: DISCONTINUED | OUTPATIENT
Start: 2022-09-19 | End: 2022-09-19 | Stop reason: HOSPADM

## 2022-09-19 RX ORDER — POLYETHYLENE GLYCOL 3350 17 G/17G
17 POWDER, FOR SOLUTION ORAL DAILY
Status: DISCONTINUED | OUTPATIENT
Start: 2022-09-20 | End: 2022-09-21

## 2022-09-19 RX ORDER — KETOROLAC TROMETHAMINE 30 MG/ML
15 INJECTION, SOLUTION INTRAMUSCULAR; INTRAVENOUS
Status: COMPLETED | OUTPATIENT
Start: 2022-09-19 | End: 2022-09-19

## 2022-09-19 RX ORDER — BUPIVACAINE HYDROCHLORIDE 7.5 MG/ML
INJECTION, SOLUTION EPIDURAL; RETROBULBAR
Status: COMPLETED | OUTPATIENT
Start: 2022-09-19 | End: 2022-09-19

## 2022-09-19 RX ORDER — SODIUM CHLORIDE 9 MG/ML
125 INJECTION, SOLUTION INTRAVENOUS CONTINUOUS
Status: DISCONTINUED | OUTPATIENT
Start: 2022-09-19 | End: 2022-09-20

## 2022-09-19 RX ORDER — FENTANYL CITRATE 50 UG/ML
50 INJECTION, SOLUTION INTRAMUSCULAR; INTRAVENOUS
Status: DISCONTINUED | OUTPATIENT
Start: 2022-09-19 | End: 2022-09-19 | Stop reason: HOSPADM

## 2022-09-19 RX ORDER — SODIUM CHLORIDE 0.9 % (FLUSH) 0.9 %
5-40 SYRINGE (ML) INJECTION EVERY 8 HOURS
Status: DISCONTINUED | OUTPATIENT
Start: 2022-09-19 | End: 2022-09-19 | Stop reason: HOSPADM

## 2022-09-19 RX ORDER — SODIUM CHLORIDE 0.9 % (FLUSH) 0.9 %
5-40 SYRINGE (ML) INJECTION EVERY 8 HOURS
Status: DISCONTINUED | OUTPATIENT
Start: 2022-09-19 | End: 2022-09-19

## 2022-09-19 RX ORDER — POLYETHYLENE GLYCOL 3350 17 G/17G
17 POWDER, FOR SOLUTION ORAL DAILY
Status: DISCONTINUED | OUTPATIENT
Start: 2022-09-20 | End: 2022-09-21 | Stop reason: HOSPADM

## 2022-09-19 RX ORDER — VERAPAMIL HYDROCHLORIDE 120 MG/1
240 CAPSULE, EXTENDED RELEASE ORAL
Status: DISCONTINUED | OUTPATIENT
Start: 2022-09-19 | End: 2022-09-19

## 2022-09-19 RX ORDER — DOCUSATE SODIUM 100 MG/1
100 CAPSULE, LIQUID FILLED ORAL DAILY
Status: DISCONTINUED | OUTPATIENT
Start: 2022-09-20 | End: 2022-09-21 | Stop reason: HOSPADM

## 2022-09-19 RX ORDER — NALOXONE HYDROCHLORIDE 0.4 MG/ML
0.4 INJECTION, SOLUTION INTRAMUSCULAR; INTRAVENOUS; SUBCUTANEOUS AS NEEDED
Status: DISCONTINUED | OUTPATIENT
Start: 2022-09-19 | End: 2022-09-21 | Stop reason: HOSPADM

## 2022-09-19 RX ORDER — DULOXETIN HYDROCHLORIDE 30 MG/1
90 CAPSULE, DELAYED RELEASE ORAL DAILY
Status: DISCONTINUED | OUTPATIENT
Start: 2022-09-20 | End: 2022-09-21 | Stop reason: HOSPADM

## 2022-09-19 RX ORDER — LIDOCAINE HYDROCHLORIDE 10 MG/ML
0.1 INJECTION, SOLUTION EPIDURAL; INFILTRATION; INTRACAUDAL; PERINEURAL AS NEEDED
Status: DISCONTINUED | OUTPATIENT
Start: 2022-09-19 | End: 2022-09-19

## 2022-09-19 RX ORDER — ASPIRIN 81 MG/1
81 TABLET ORAL 2 TIMES DAILY
Status: DISCONTINUED | OUTPATIENT
Start: 2022-09-19 | End: 2022-09-21 | Stop reason: HOSPADM

## 2022-09-19 RX ORDER — SODIUM CHLORIDE 9 MG/ML
50 INJECTION, SOLUTION INTRAVENOUS CONTINUOUS
Status: DISCONTINUED | OUTPATIENT
Start: 2022-09-19 | End: 2022-09-19 | Stop reason: SDUPTHER

## 2022-09-19 RX ORDER — ACETAMINOPHEN 500 MG
1000 TABLET ORAL EVERY 6 HOURS
Status: DISCONTINUED | OUTPATIENT
Start: 2022-09-19 | End: 2022-09-21 | Stop reason: HOSPADM

## 2022-09-19 RX ORDER — TRAMADOL HYDROCHLORIDE 50 MG/1
50 TABLET ORAL 2 TIMES DAILY
Status: DISCONTINUED | OUTPATIENT
Start: 2022-09-19 | End: 2022-09-21 | Stop reason: HOSPADM

## 2022-09-19 RX ORDER — INSULIN GLARGINE 100 [IU]/ML
14 INJECTION, SOLUTION SUBCUTANEOUS
Status: DISCONTINUED | OUTPATIENT
Start: 2022-09-19 | End: 2022-09-21 | Stop reason: HOSPADM

## 2022-09-19 RX ORDER — DEXTROSE MONOHYDRATE 100 MG/ML
0-250 INJECTION, SOLUTION INTRAVENOUS AS NEEDED
Status: DISCONTINUED | OUTPATIENT
Start: 2022-09-19 | End: 2022-09-21 | Stop reason: HOSPADM

## 2022-09-19 RX ORDER — SODIUM CHLORIDE 0.9 % (FLUSH) 0.9 %
5-40 SYRINGE (ML) INJECTION AS NEEDED
Status: DISCONTINUED | OUTPATIENT
Start: 2022-09-19 | End: 2022-09-21 | Stop reason: HOSPADM

## 2022-09-19 RX ORDER — OXYCODONE AND ACETAMINOPHEN 5; 325 MG/1; MG/1
1 TABLET ORAL AS NEEDED
Status: DISCONTINUED | OUTPATIENT
Start: 2022-09-19 | End: 2022-09-19 | Stop reason: HOSPADM

## 2022-09-19 RX ORDER — FENTANYL CITRATE 50 UG/ML
50 INJECTION, SOLUTION INTRAMUSCULAR; INTRAVENOUS AS NEEDED
Status: DISCONTINUED | OUTPATIENT
Start: 2022-09-19 | End: 2022-09-19

## 2022-09-19 RX ORDER — CALCIUM CARBONATE/VITAMIN D3 600MG-5MCG
1 TABLET ORAL
Status: DISCONTINUED | OUTPATIENT
Start: 2022-09-20 | End: 2022-09-21 | Stop reason: HOSPADM

## 2022-09-19 RX ORDER — FACIAL-BODY WIPES
10 EACH TOPICAL DAILY PRN
Status: DISCONTINUED | OUTPATIENT
Start: 2022-09-21 | End: 2022-09-21 | Stop reason: HOSPADM

## 2022-09-19 RX ORDER — MAGNESIUM SULFATE 100 %
4 CRYSTALS MISCELLANEOUS AS NEEDED
Status: DISCONTINUED | OUTPATIENT
Start: 2022-09-19 | End: 2022-09-21 | Stop reason: HOSPADM

## 2022-09-19 RX ORDER — BACITRACIN ZINC 500 [USP'U]/G
1 CREAM TOPICAL ONCE
Status: COMPLETED | OUTPATIENT
Start: 2022-09-19 | End: 2022-09-19

## 2022-09-19 RX ORDER — HYDROMORPHONE HYDROCHLORIDE 1 MG/ML
0.5 INJECTION, SOLUTION INTRAMUSCULAR; INTRAVENOUS; SUBCUTANEOUS
Status: DISCONTINUED | OUTPATIENT
Start: 2022-09-19 | End: 2022-09-19 | Stop reason: HOSPADM

## 2022-09-19 RX ORDER — ONDANSETRON 2 MG/ML
4 INJECTION INTRAMUSCULAR; INTRAVENOUS AS NEEDED
Status: DISCONTINUED | OUTPATIENT
Start: 2022-09-19 | End: 2022-09-19 | Stop reason: HOSPADM

## 2022-09-19 RX ORDER — INSULIN LISPRO 100 [IU]/ML
INJECTION, SOLUTION INTRAVENOUS; SUBCUTANEOUS
Status: DISCONTINUED | OUTPATIENT
Start: 2022-09-19 | End: 2022-09-21 | Stop reason: HOSPADM

## 2022-09-19 RX ORDER — CEFAZOLIN SODIUM 1 G/3ML
INJECTION, POWDER, FOR SOLUTION INTRAMUSCULAR; INTRAVENOUS AS NEEDED
Status: DISCONTINUED | OUTPATIENT
Start: 2022-09-19 | End: 2022-09-19 | Stop reason: HOSPADM

## 2022-09-19 RX ORDER — PROPOFOL 10 MG/ML
INJECTION, EMULSION INTRAVENOUS
Status: DISCONTINUED | OUTPATIENT
Start: 2022-09-19 | End: 2022-09-19 | Stop reason: HOSPADM

## 2022-09-19 RX ORDER — SODIUM CHLORIDE 9 MG/ML
INJECTION, SOLUTION INTRAVENOUS
Status: DISCONTINUED | OUTPATIENT
Start: 2022-09-19 | End: 2022-09-19 | Stop reason: HOSPADM

## 2022-09-19 RX ORDER — LISINOPRIL 20 MG/1
20 TABLET ORAL DAILY
Status: DISCONTINUED | OUTPATIENT
Start: 2022-09-19 | End: 2022-09-21 | Stop reason: HOSPADM

## 2022-09-19 RX ORDER — AMOXICILLIN 250 MG
1 CAPSULE ORAL 2 TIMES DAILY
Status: DISCONTINUED | OUTPATIENT
Start: 2022-09-19 | End: 2022-09-21 | Stop reason: HOSPADM

## 2022-09-19 RX ORDER — SODIUM CHLORIDE 0.9 % (FLUSH) 0.9 %
5-40 SYRINGE (ML) INJECTION AS NEEDED
Status: DISCONTINUED | OUTPATIENT
Start: 2022-09-19 | End: 2022-09-19 | Stop reason: HOSPADM

## 2022-09-19 RX ORDER — DIPHENHYDRAMINE HYDROCHLORIDE 50 MG/ML
12.5 INJECTION, SOLUTION INTRAMUSCULAR; INTRAVENOUS AS NEEDED
Status: DISCONTINUED | OUTPATIENT
Start: 2022-09-19 | End: 2022-09-19 | Stop reason: HOSPADM

## 2022-09-19 RX ORDER — TRAMADOL HYDROCHLORIDE 50 MG/1
50 TABLET ORAL
Status: DISCONTINUED | OUTPATIENT
Start: 2022-09-19 | End: 2022-09-21 | Stop reason: HOSPADM

## 2022-09-19 RX ORDER — SODIUM CHLORIDE 9 MG/ML
75 INJECTION, SOLUTION INTRAVENOUS CONTINUOUS
Status: DISCONTINUED | OUTPATIENT
Start: 2022-09-19 | End: 2022-09-19

## 2022-09-19 RX ADMIN — PHENYLEPHRINE HYDROCHLORIDE 30 MCG/MIN: 10 INJECTION INTRAVENOUS at 16:23

## 2022-09-19 RX ADMIN — PHENYLEPHRINE HYDROCHLORIDE 100 MCG: 10 INJECTION INTRAVENOUS at 16:23

## 2022-09-19 RX ADMIN — BUPIVACAINE HYDROCHLORIDE 13.5 MG: 7.5 INJECTION, SOLUTION EPIDURAL; RETROBULBAR at 16:04

## 2022-09-19 RX ADMIN — METOPROLOL TARTRATE 12.5 MG: 25 TABLET, FILM COATED ORAL at 12:48

## 2022-09-19 RX ADMIN — LIDOCAINE HYDROCHLORIDE 40 MG: 20 INJECTION, SOLUTION EPIDURAL; INFILTRATION; INTRACAUDAL; PERINEURAL at 15:50

## 2022-09-19 RX ADMIN — PROPOFOL 30 MG: 10 INJECTION, EMULSION INTRAVENOUS at 15:52

## 2022-09-19 RX ADMIN — ASPIRIN 81 MG: 81 TABLET, COATED ORAL at 20:21

## 2022-09-19 RX ADMIN — SODIUM CHLORIDE, PRESERVATIVE FREE 10 ML: 5 INJECTION INTRAVENOUS at 13:22

## 2022-09-19 RX ADMIN — NITROGLYCERIN 1 INCH: 20 OINTMENT TOPICAL at 12:48

## 2022-09-19 RX ADMIN — ACETAMINOPHEN 1000 MG: 500 TABLET ORAL at 12:48

## 2022-09-19 RX ADMIN — INSULIN LISPRO 2 UNITS: 100 INJECTION, SOLUTION INTRAVENOUS; SUBCUTANEOUS at 08:13

## 2022-09-19 RX ADMIN — SODIUM CHLORIDE, PRESERVATIVE FREE 10 ML: 5 INJECTION INTRAVENOUS at 05:48

## 2022-09-19 RX ADMIN — PROPOFOL 50 MCG/KG/MIN: 10 INJECTION, EMULSION INTRAVENOUS at 16:32

## 2022-09-19 RX ADMIN — KETOROLAC TROMETHAMINE 15 MG: 30 INJECTION, SOLUTION INTRAMUSCULAR; INTRAVENOUS at 03:41

## 2022-09-19 RX ADMIN — METOPROLOL TARTRATE 12.5 MG: 25 TABLET, FILM COATED ORAL at 22:35

## 2022-09-19 RX ADMIN — CEFAZOLIN SODIUM 2 G: 1 INJECTION, POWDER, FOR SOLUTION INTRAMUSCULAR; INTRAVENOUS at 16:32

## 2022-09-19 RX ADMIN — HYDROMORPHONE HYDROCHLORIDE 0.5 MG: 1 INJECTION, SOLUTION INTRAMUSCULAR; INTRAVENOUS; SUBCUTANEOUS at 08:13

## 2022-09-19 RX ADMIN — PROPOFOL 30 MCG/KG/MIN: 10 INJECTION, EMULSION INTRAVENOUS at 16:15

## 2022-09-19 RX ADMIN — INSULIN GLARGINE 14 UNITS: 100 INJECTION, SOLUTION SUBCUTANEOUS at 22:34

## 2022-09-19 RX ADMIN — PROPOFOL 30 MCG/KG/MIN: 10 INJECTION, EMULSION INTRAVENOUS at 16:05

## 2022-09-19 RX ADMIN — TRAMADOL HYDROCHLORIDE 50 MG: 50 TABLET, COATED ORAL at 20:21

## 2022-09-19 RX ADMIN — SENNOSIDES AND DOCUSATE SODIUM 1 TABLET: 50; 8.6 TABLET ORAL at 20:21

## 2022-09-19 RX ADMIN — SODIUM CHLORIDE: 9 INJECTION, SOLUTION INTRAVENOUS at 16:24

## 2022-09-19 RX ADMIN — SODIUM CHLORIDE, PRESERVATIVE FREE 10 ML: 5 INJECTION INTRAVENOUS at 05:47

## 2022-09-19 RX ADMIN — SODIUM CHLORIDE: 9 INJECTION, SOLUTION INTRAVENOUS at 15:45

## 2022-09-19 RX ADMIN — Medication 1 PACKET: at 03:41

## 2022-09-19 RX ADMIN — PHENYLEPHRINE HYDROCHLORIDE 200 MCG: 10 INJECTION INTRAVENOUS at 16:25

## 2022-09-19 RX ADMIN — PROPOFOL 20 MG: 10 INJECTION, EMULSION INTRAVENOUS at 15:57

## 2022-09-19 RX ADMIN — PROPOFOL 10 MG: 10 INJECTION, EMULSION INTRAVENOUS at 16:04

## 2022-09-19 RX ADMIN — SODIUM CHLORIDE 125 ML/HR: 9 INJECTION, SOLUTION INTRAVENOUS at 18:07

## 2022-09-19 RX ADMIN — SODIUM CHLORIDE 75 ML/HR: 9 INJECTION, SOLUTION INTRAVENOUS at 04:58

## 2022-09-19 NOTE — PROGRESS NOTES
1729: Spoke to pt.'s daughter and updated. Encouraged to go back to room after speaking with surgeon. Verbalizes understanding. 1800: Pt's daughter, [employer], notified pt. Back in room.

## 2022-09-19 NOTE — PROGRESS NOTES
Problem: Falls - Risk of  Goal: *Absence of Falls  Description: Document Starleen Freeze Fall Risk and appropriate interventions in the flowsheet. Outcome: Progressing Towards Goal  Note: Fall Risk Interventions:  Mobility Interventions: Patient to call before getting OOB, OT consult for ADLs, PT Consult for mobility concerns         Medication Interventions: Patient to call before getting OOB, Bed/chair exit alarm    Elimination Interventions: Call light in reach, Bed/chair exit alarm, Patient to call for help with toileting needs              Problem: Patient Education: Go to Patient Education Activity  Goal: Patient/Family Education  Outcome: Progressing Towards Goal     Problem: Pressure Injury - Risk of  Goal: *Prevention of pressure injury  Description: Document Chris Scale and appropriate interventions in the flowsheet.   Outcome: Progressing Towards Goal  Note: Pressure Injury Interventions:  Sensory Interventions: Minimize linen layers    Moisture Interventions: Absorbent underpads, Minimize layers    Activity Interventions: PT/OT evaluation    Mobility Interventions: PT/OT evaluation    Nutrition Interventions: Document food/fluid/supplement intake    Friction and Shear Interventions: HOB 30 degrees or less, Minimize layers                Problem: Patient Education: Go to Patient Education Activity  Goal: Patient/Family Education  Outcome: Progressing Towards Goal

## 2022-09-19 NOTE — OP NOTES
Name: Sam Lacey    MRN: [id number]    Surgery Date: 9/19/2022    Primary Surgeon: Christina Mclaughlin MD    Pre-operative diagnosis: Intertrochanteric fracture of the left hip    Post-operative diagnosis: intertrochanteric fracture of left hip    Surgery Performed:  Closed reduction and intramedullary nailing of left hip intertrochanteric fracture    Assistants:  none    Anesthesia:  spinal    Estimated Blood Loss:  153 cc    Complications: none    Specimen:  none    Implants:   : Synthes   11 mm x 170 mm X 130 degree TFNA   95 mm long helical blade   38 mm long distal locking screw    Implant Name Type Inv. Item Serial No.  Lot No. LRB No. Used Action   BLADE IM L95MM DIA10.35MM PROX FEM G TI BREE FEN HIREN FOR - SNA  BLADE IM L95MM DIA10.35MM PROX FEM G TI BREE FEN HIREN FOR NA DEPUY SYNTHES [facility] [id number] Left 1 Implanted   NAIL IM L170MM [id number] 130DEG SHT FEM GRN TI BREE NEAL - SNA  NAIL IM L170MM [id number] 130DEG SHT FEM GRN TI BREE NEAL NA DEPUY SYNTHES [facility] [id number] Left 1 Implanted   SCREW BNE L38MM DIA5MM NONSTERILE TIB LT GRN TI ST BREE MORE - SNA  SCREW BNE L38MM DIA5MM NONSTERILE TIB LT GRN TI ST BREE MORE NA DEPUY SYNTHES [place]_ NA Left 1 Implanted     Indications for surgery:    patient is 80y.o.-year-old female who fell down  and injured the hip. patient was brought to ER where x-rays demonstrated intertrochanteric fracture. treatment options were discussed and then after discussing risks and benefits, we decided to take her in operating room for fixation of intertrochanteric fracture. Description of Procedure:   Patient as well as  left extremity was identified and marked in holding area and then patient was brought to the room. general anesthesia was induced. patient was positioned on fracture table with the  fractured lower extremity in traction and opposite leg in well-padded well leg awan.   reduction was achieved with closed manipulation and traction under fluoroscopic guidance. Standard preping and draping was done. 2 grams of Ancef antibiotics was infused within 30 min of incision. time out was completed. Surface line marking was done and proximal aspect of greater trochanter was marked. now approximately 1 inch incision was made proximally and dissection was carried through soft tissue and gluteus muscles. tip of the greater trochanter was palpated and then sharp awl was inserted through the trochanteric entry. fluoroscopic views were done to confirm position and then guide wire was inserted. now proximal drilling was done using appropriate drill. above-mentioned size short TFNA nail was inserted. now using the aiming guide, cephalomedullary guide pin was inserted under fluoroscopic views. measurement was done and then above-mentioned size helical blade was impacted. position of the helical blade was checked under fluoroscopic views. now approximately the blade was secured by tightening the cap screw and then reverse screwing was done to achieve compression at fracture site for couple turns. now distal locking screw was applied via aiming guide. whole construct was checked under fluoroscopic views. irrigation was done after removing aiming guide. closure was done with 2 0 Vicryl for subcutaneous tissues and 3-0 Monocryl for the skin. Steri-Strips and op-site dressing was applied. Pt's Condition at end of Procedure/Post-Sedation: Patient was transferred to recovery room in stable condition. recovery room neurovascular status was intact. Attending: Nena Watt MD was present during entire procedure and performed all steps by myself.

## 2022-09-19 NOTE — CONSULTS
ORTHOPEDIC CONSULT    Patient: Hong Vann MRN: [id number]  SSN: [id number]    YOB: 1932  Age: 80 y.o. Sex: female      Subjective:      Hong Vann is a 80 y.o. female who is being seen in orthopedic consult for left hip fracture. The patient states she lost her footing on a pair of pants in the bathroom and she fell causing injury to her left hip. The patient denies any dizziness or lightheadedness prior to falling. She denies any history of frequent falls. I was able to speak with the patient's daughter Cortney Hayden by phone and she states that her mother recently had a kyphoplasty procedure done approximately 2 months ago and she is spent about a month in rehab at [address]. She states since that time she has had decreasing ability ambulating. She has been having therapy come to the house twice a week to work with her on this. She does use a walker and wheelchair for mobility. The patient is now complaining of mild pain of her left hip which is aggravated with any movement. She denies any numbness or ting of her left lower extremity. She denies any injury to head or cervical spine from a fall. She denies any other musculoskeletal complaints at this time.     Patient complains of diffuse pain around the hip  10 out of 10 with any movement of extremity  Dull aching and spasm kind of pain      Past Medical History:   Diagnosis Date    Arthritis     CHF (congestive heart failure) (HCC)     Chronic kidney disease     Diabetes ([facility]     Heart failure (HCC)     Hypertension      Past Surgical History:   Procedure Laterality Date    HX APPENDECTOMY      HX BILATERAL SALPINGO-OOPHORECTOMY  1971    HX HEENT      lump removal on head    HX HYSTERECTOMY      IR KYPHOPLASTY LUMBAR  5/4/2022      Family History   Problem Relation Age of Onset    Hypertension Mother      Social History     Tobacco Use    Smoking status: Never    Smokeless tobacco: Never   Substance Use Topics    Alcohol use: Never      Prior to Admission medications    Medication Sig Start Date End Date Taking? Authorizing Provider   fexofenadine (ALLEGRA) 180 mg tablet  3/9/22   Provider, Historical   Lantus Solostar U-100 Insulin 100 unit/mL (3 mL) inpn  3/9/22   Provider, Historical   lisinopriL (PRINIVIL, ZESTRIL) 20 mg tablet  3/9/22   Provider, Historical   Januvia 50 mg tablet  3/9/22   Provider, Historical   aspirin delayed-release 81 mg tablet Take 81 mg by mouth daily. Provider, Historical   allopurinoL (ZYLOPRIM) 300 mg tablet Take 300 mg by mouth daily. Provider, Historical   melatonin 3 mg tablet Take 3 mg by mouth nightly. Provider, Historical   furosemide (LASIX) 40 mg tablet Take 40 mg by mouth daily. Provider, Historical   cholecalciferol, vitamin D3, 50 mcg (2,000 unit) tab Take 1 Tablet by mouth daily. Provider, Historical   hydrALAZINE (APRESOLINE) 50 mg tablet Take 25 mg by mouth three (3) times daily. Gino, MD Juan   DULoxetine (CYMBALTA) 60 mg capsule  10/4/21   Juan Christian MD   ferrous sulfate 325 mg (65 mg iron) tablet Take 325 mg by mouth. Juan Christian MD   insulin glargine (LANTUS) 100 unit/mL injection by SubCUTAneous route At bedtime. Juan Christian MD   potassium chloride (KAON 10%) 20 mEq/15 mL solution Take  by mouth daily. Juan Christian MD   verapamil ER (CALAN-SR) 240 mg CR tablet Take 240 mg by mouth nightly. Juan Christian MD       No Known Allergies    Review of Systems:  Review of Systems   Constitutional: Negative. HENT: Negative. Eyes: Negative. Respiratory: Negative. Cardiovascular: Negative. Gastrointestinal: Negative. Genitourinary: Negative. Musculoskeletal:  Positive for joint pain. Left hip   Skin: Negative. Neurological: Negative. Endo/Heme/Allergies: Negative. Psychiatric/Behavioral: Negative.          Objective:     Current Facility-Administered Medications   Medication Dose Route Frequency    sodium chloride (NS) flush 5-40 mL  5-40 mL IntraVENous Q8H    sodium chloride (NS) flush 5-40 mL  5-40 mL IntraVENous PRN    insulin lispro (HUMALOG) injection   SubCUTAneous AC&HS    glucose chewable tablet 16 g  4 Tablet Oral PRN    glucagon (GLUCAGEN) injection 1 mg  1 mg IntraMUSCular PRN    0.9% sodium chloride infusion  75 mL/hr IntraVENous CONTINUOUS    sodium chloride (NS) flush 5-40 mL  5-40 mL IntraVENous Q8H    sodium chloride (NS) flush 5-40 mL  5-40 mL IntraVENous PRN    naloxone (NARCAN) injection 0.4 mg  0.4 mg IntraVENous PRN    dextrose 10% infusion 0-250 mL  0-250 mL IntraVENous PRN    HYDROmorphone (DILAUDID) syringe 0.5 mg  0.5 mg IntraVENous Q6H PRN    verapamil ER (VERELAN) capsule 240 mg  240 mg Oral QHS    lisinopriL (PRINIVIL, ZESTRIL) tablet 20 mg  20 mg Oral DAILY    alogliptin (NESINA) tablet 12.5 mg  12.5 mg Oral DAILY    nitroglycerin (NITROBID) 2 % ointment 1 Inch  1 Inch Topical Q6H    metoprolol tartrate (LOPRESSOR) tablet 12.5 mg  12.5 mg Oral Q12H    traMADoL (ULTRAM) tablet 50 mg  50 mg Oral Q6H PRN    acetaminophen (TYLENOL) tablet 1,000 mg  1,000 mg Oral Q6H      Vitals:    09/19/22 0826 09/19/22 1223 09/19/22 1406 09/19/22 1448   BP: (!) 142/74 136/73 (!) 147/72    Pulse: 99 (!) 104 (!) 101    Resp: 20 18     Temp: 98.9 °F (37.2 °C) 99.4 °F (37.4 °C)     SpO2: 98% 92% 96%    Weight:       Height:    5' 1\" (1.549 m)        Alert and oriented x3, No apparent distress    Physical Exam:  Left lower extremity: There is foreshortening and external rotation seen of her left lower extremity compared to the right. There was +1 nonpitting edema seen of her bilateral lower extremities. No calf pain to palpation. No erythema or ecchymosis seen. No tenderness palpation throughout her left lower extremity. DP/PT pulses are palpable. Cap refill is 2 seconds. EHL/DF/PF is 3 out of 5. Negative Homans. Left lower extremity neurovascularly intact.     Labs:  CBC:  Recent Labs     09/19/22  0304   WBC 14.0*   RBC 3.78*   HGB 11.5   HCT 35.5   MCV 93.9   RDW 14.8*          CHEMISTRIES:  Recent Labs     09/19/22  0441 09/19/22  0304   NA  --  140  141   K Hemolyzed, recollect requested 4.4  Hemolyzed, recollect requested   CL  --  107  107   CO2  --  25  27   BUN  --  23*  22*   CREA  --  1.64*  1.64*   CA  --  8.8  8.8   PHOS  --  3.9   MG Hemolyzed, recollect requested Hemolyzed, recollect requested     PT/INR:  Recent Labs     09/19/22  0304   INR 1.1       APTT:No results for input(s): APTT in the last 72 hours. LIVER PROFILE:  Recent Labs     09/19/22  0304   AST Hemolyzed, recollect requested   ALT 13         IMAGING:  X-rays taken of her pelvis and left hip show a displaced intertrochanteric fracture of her left hip. Moderate osteopenia seen throughout pelvic girdle and left proximal femur. No other acute fracture seen. Assessment/Plan:   Displaced intertrochanteric fracture of right hip    Plan/recommendation:  -X-rays and clinical findings discussed with the patient as well as family members. Patient was limited ambulator before but she was living alone and managing to move around the home. With the displaced fracture, we discussed operative treatment as the nonoperative treatment will make her bedridden and which has its own complications. After discussion of pros and cons of both treatment modalities, we all agreed for treatment with surgery. Intramedullary nailing discussed.  -Risk and benefits of surgery discussed. Possible complications including but not limited to infection, nonunion of the fracture, malunion of the fracture, fracture fixation failure, nerve and vessels injury, tendon injury, shortening of the extremity and blood clot in legs or lungs discussed.  -Patient has been admitted under medical service for optimization  -We will take her to operating room when the OR is available and patient is optimized.   Signed By: Ollie Aggarwal MD     September 19, 2022

## 2022-09-19 NOTE — ED PROVIDER NOTES
EMERGENCY DEPARTMENT HISTORY AND PHYSICAL EXAM      Date: 9/19/2022  Patient Name: Juan Oviedo    History of Presenting Illness     Chief Complaint   Patient presents with    Fall       History Provided By: Patient    HPI: Juan Oviedo, 80 y.o. female who slipped and fell in the bathroom just prior to arrival.  She says she was unable to ambulate. She is complaining of some abrasions and pain on her left hip. She does arrive via EMS. Is been no treatment prior to arrival.  Pain is mild to moderate does not radiate and is made worse with movement. There are no other complaints, changes, or physical findings at this time.     PCP: Bartolo Jeter MD    Current Facility-Administered Medications   Medication Dose Route Frequency Provider Last Rate Last Admin    sodium chloride (NS) flush 5-40 mL  5-40 mL IntraVENous Q8H Chantale CARVAJAL MD   10 mL at 09/19/22 0548    sodium chloride (NS) flush 5-40 mL  5-40 mL IntraVENous PRN Carol Haile MD        insulin lispro (HUMALOG) injection   SubCUTAneous AC&HS Rosa Leblanc MD        glucose chewable tablet 16 g  4 Tablet Oral PRN Rosa Leblanc MD        glucagon (GLUCAGEN) injection 1 mg  1 mg IntraMUSCular PRN Rosa Leblanc MD        0.9% sodium chloride infusion  75 mL/hr IntraVENous CONTINUOUS Rosa Leblanc MD 75 mL/hr at 09/19/22 0458 75 mL/hr at 09/19/22 0458    sodium chloride (NS) flush 5-40 mL  5-40 mL IntraVENous Q8H Rosa Leblanc MD   10 mL at 09/19/22 0547    sodium chloride (NS) flush 5-40 mL  5-40 mL IntraVENous PRN Rosa Leblanc MD        naloxone [facility]) injection 0.4 mg  0.4 mg IntraVENous PRN Rosa Leblanc MD        dextrose 10% infusion 0-250 mL  0-250 mL IntraVENous PRN Rosa Leblanc MD           Past History   Past Medical History:  Past Medical History:   Diagnosis Date    Arthritis     CHF (congestive heart failure) ([facility] [address]     Chronic kidney disease     Diabetes ([facility]     Heart failure ([facility]     Hypertension        Past Surgical History:  Past Surgical History:   Procedure Laterality Date    HX APPENDECTOMY      HX BILATERAL SALPINGO-OOPHORECTOMY  1971    HX HEENT      lump removal on head    HX HYSTERECTOMY      IR KYPHOPLASTY LUMBAR  5/4/2022       Family History:  Family History   Problem Relation Age of Onset    Hypertension Mother        Social History:  Social History     Tobacco Use    Smoking status: Never    Smokeless tobacco: Never   Vaping Use    Vaping Use: Never used   Substance Use Topics    Alcohol use: Never    Drug use: Never       Allergies:  No Known Allergies  Review of Systems   Review of Systems   Constitutional: Negative. Negative for appetite change, chills, fatigue and fever. HENT: Negative. Negative for congestion and sinus pain. Eyes: Negative. Negative for pain and visual disturbance. Respiratory: Negative. Negative for chest tightness and shortness of breath. Cardiovascular: Negative. Negative for chest pain. Gastrointestinal: Negative. Negative for abdominal pain, diarrhea, nausea and vomiting. Genitourinary: Negative. Negative for difficulty urinating. No discharge   Musculoskeletal:  Positive for arthralgias. Skin:  Positive for wound. Negative for rash. Neurological: Negative. Negative for weakness and headaches. Hematological: Negative. Psychiatric/Behavioral: Negative. Negative for agitation. The patient is not nervous/anxious. All other systems reviewed and are negative. Physical Exam   Physical Exam  Vitals and nursing note reviewed. Constitutional:       General: She is not in acute distress. Appearance: She is well-developed. HENT:      Head: Normocephalic and atraumatic. Nose: Nose normal.      Mouth/Throat:      Mouth: Mucous membranes are moist.      Pharynx: Oropharynx is clear. No oropharyngeal exudate. Eyes:      General:         Right eye: No discharge.          Left eye: No discharge. Conjunctiva/sclera: Conjunctivae normal.      Pupils: Pupils are equal, round, and reactive to light. Cardiovascular:      Rate and Rhythm: Normal rate and regular rhythm. Chest Wall: PMI is not displaced. No thrill. Heart sounds: Normal heart sounds. No murmur heard. No friction rub. No gallop. Pulmonary:      Effort: Pulmonary effort is normal. No respiratory distress. Breath sounds: Normal breath sounds. No wheezing or rales. Chest:      Chest wall: No tenderness. Abdominal:      General: Bowel sounds are normal. There is no distension. Palpations: Abdomen is soft. There is no mass. Tenderness: There is no abdominal tenderness. There is no guarding or rebound. Musculoskeletal:         General: Tenderness present. Normal range of motion. Cervical back: Normal range of motion and neck supple. Comments: Mild tender to palpation at left hip with left leg externally rotated and shortened. DP and PT pulses intact cap refill less than 2 seconds. Lymphadenopathy:      Cervical: No cervical adenopathy. Skin:     General: Skin is warm and dry. Capillary Refill: Capillary refill takes less than 2 seconds. Findings: No erythema or rash. Neurological:      Mental Status: She is alert and oriented to person, place, and time. Cranial Nerves: No cranial nerve deficit.       Coordination: Coordination normal.   Psychiatric:         Mood and Affect: Mood normal.         Behavior: Behavior normal.       Lab and Diagnostic Study Results   Labs -     Recent Results (from the past 12 hour(s))   CBC WITH AUTOMATED DIFF    Collection Time: 09/19/22  3:04 AM   Result Value Ref Range    WBC 14.0 (H) 3.6 - 11.0 K/uL    RBC 3.78 (L) 3.80 - 5.20 M/uL    HGB 11.5 11.5 - 16.0 g/dL    HCT 35.5 35.0 - 47.0 %    MCV 93.9 80.0 - 99.0 FL    MCH 30.4 26.0 - 34.0 PG    MCHC 32.4 30.0 - 36.5 g/dL    RDW 14.8 (H) 11.5 - 14.5 %    PLATELET 905 769 - 086 K/uL MPV 9.9 8.9 - 12.9 FL    NRBC 0.0 0.0  WBC    ABSOLUTE NRBC 0.00 0.00 - 0.01 K/uL    NEUTROPHILS 81 (H) 32 - 75 %    LYMPHOCYTES 11 (L) 12 - 49 %    MONOCYTES 5 5 - 13 %    EOSINOPHILS 2 0 - 7 %    BASOPHILS 0 0 - 1 %    IMMATURE GRANULOCYTES 1 (H) 0 - 0.5 %    ABS. NEUTROPHILS 11.3 (H) 1.8 - 8.0 K/UL    ABS. LYMPHOCYTES 1.5 0.8 - 3.5 K/UL    ABS. MONOCYTES 0.7 0.0 - 1.0 K/UL    ABS. EOSINOPHILS 0.3 0.0 - 0.4 K/UL    ABS. BASOPHILS 0.1 0.0 - 0.1 K/UL    ABS. IMM. GRANS. 0.1 (H) 0.00 - 0.04 K/UL    DF AUTOMATED     METABOLIC PANEL, COMPREHENSIVE    Collection Time: 09/19/22  3:04 AM   Result Value Ref Range    Sodium 141 136 - 145 mmol/L    Potassium Hemolyzed, recollect requested 3.5 - 5.1 mmol/L    Chloride 107 97 - 108 mmol/L    CO2 27 21 - 32 mmol/L    Anion gap 7 5 - 15 mmol/L    Glucose 202 (H) 65 - 100 mg/dL    BUN 22 (H) 6 - 20 mg/dL    Creatinine 1.64 (H) 0.55 - 1.02 mg/dL    BUN/Creatinine ratio 13 12 - 20      GFR est AA 36 (L) >60 ml/min/1.73m2    GFR est non-AA 29 (L) >60 ml/min/1.73m2    Calcium 8.8 8.5 - 10.1 mg/dL    Bilirubin, total 0.3 0.2 - 1.0 mg/dL    AST (SGOT) Hemolyzed, recollect requested 15 - 37 U/L    ALT (SGPT) 13 12 - 78 U/L    Alk.  phosphatase 67 45 - 117 U/L    Protein, total 6.0 (L) 6.4 - 8.2 g/dL    Albumin 2.8 (L) 3.5 - 5.0 g/dL    Globulin 3.2 2.0 - 4.0 g/dL    A-G Ratio 0.9 (L) 1.1 - 2.2     MAGNESIUM    Collection Time: 09/19/22  3:04 AM   Result Value Ref Range    Magnesium Hemolyzed, recollect requested 1.6 - 2.4 mg/dL   PROTHROMBIN TIME + INR    Collection Time: 09/19/22  3:04 AM   Result Value Ref Range    Prothrombin time 13.9 11.9 - 14.6 sec    INR 1.1 0.9 - 1.1     TYPE & SCREEN    Collection Time: 09/19/22  3:04 AM   Result Value Ref Range    Crossmatch Expiration 09/22/2022,2359     ABO/Rh(D) A Positive     Antibody screen Negative    URINALYSIS W/MICROSCOPIC    Collection Time: 09/19/22  3:17 AM   Result Value Ref Range    Color Yellow/Straw      Appearance Clear Clear      Specific gravity 1.010 1.003 - 1.030      pH (UA) 5.0      Protein Negative Negative mg/dL    Glucose Negative Negative mg/dL    Ketone Negative Negative mg/dL    Bilirubin Negative Negative      Blood Small (A) Negative      Urobilinogen 0.1 (L) 0.2 - 1.0 EU/dL    Nitrites Negative Negative      Leukocyte Esterase Negative Negative      WBC 0-4 0 - 4 /hpf    RBC 0-5 0 - 5 /hpf    Bacteria Negative Negative /hpf   POTASSIUM    Collection Time: 09/19/22  4:41 AM   Result Value Ref Range    Potassium Hemolyzed, recollect requested 3.5 - 5.1 mmol/L   MAGNESIUM    Collection Time: 09/19/22  4:41 AM   Result Value Ref Range    Magnesium Hemolyzed, recollect requested 1.6 - 2.4 mg/dL       Radiologic Studies -   [unfilled]  CT Results  (Last 48 hours)      None          CXR Results  (Last 48 hours)                 09/19/22 0315  XR CHEST PORT Final result    Impression:  No evidence of acute cardiopulmonary process. Narrative:  INDICATION: Chest pain       FINDINGS: AP portable imaging of the chest performed at 3:01 AM demonstrates a   normal cardiomediastinal silhouette. The lungs are clear bilaterally. There is   dextroconvex curvature of the thoracic spine, with associated degenerative   changes. Medical Decision Making and ED Course   Differential Diagnosis & Medical Decision Making Provider Note:   Differential includes strain, sprain, contusion, fracture. - I am the first and primary provider for this patient. I reviewed the vital signs, available nursing notes, past medical history, past surgical history, family history and social history. The patient's presenting problems have been discussed, and the staff are in agreement with the care plan formulated and outlined with them. I have encouraged them to ask questions as they arise throughout their visit. Vital Signs-Reviewed the patient's vital signs.   Patient Vitals for the past 12 hrs:   Temp Pulse Resp BP SpO2 09/19/22 0545 98 °F (36.7 °C) (!) 105 18 (!) 149/92 95 %   09/19/22 0505 98.1 °F (36.7 °C) (!) 108 16 (!) 138/120 94 %   09/19/22 0231 98.2 °F (36.8 °C) 96 18 (!) 193/88 93 %       EKG interpretation: (Preliminary): EKG Interpreted by me. Shows rate 94 bpm,  ms, QRS duration 118 ms,  ms. Interpretation: Normal sinus rhythm with right bundle branch block. Abnormal EKG. ED Course:      Is a left intertrochanteric hip fracture. Patient be admitted to the hospital for Ortho evaluation. Procedures and Critical Care     Disposition   Disposition: Condition stable        Diagnosis/Clinical Impression     Clinical Impression:   1. Closed fracture of left hip, initial encounter [facility])        Attestations: Merrick Jorgensen MD, am the primary clinician of record. Please note that this dictation was completed with Modify, the computer voice recognition software. Quite often unanticipated grammatical, syntax, homophones, and other interpretive errors are inadvertently transcribed by the computer software. Please disregard these errors. Please excuse any errors that have escaped final proofreading. Thank you.

## 2022-09-19 NOTE — ANESTHESIA POSTPROCEDURE EVALUATION
Procedure(s):  Open Reduction Internal Fixation Left Hip.    general    Anesthesia Post Evaluation        Patient location during evaluation: PACU  Patient participation: complete - patient participated  Level of consciousness: awake  Pain score: 0  Pain management: adequate  Airway patency: patent  Anesthetic complications: no  Cardiovascular status: acceptable  Respiratory status: acceptable  Hydration status: acceptable  Post anesthesia nausea and vomiting:  controlled  Final Post Anesthesia Temperature Assessment:  Normothermia (36.0-37.5 degrees C)      INITIAL Post-op Vital signs:   Vitals Value Taken Time   /72 09/19/22 1735   Temp 36.2 °C (97.1 °F) 09/19/22 1722   Pulse 83 09/19/22 1740   Resp 18 09/19/22 1740   SpO2 93 % 09/19/22 1740   Vitals shown include unvalidated device data.

## 2022-09-19 NOTE — PROGRESS NOTES
Comprehensive Nutrition Assessment    Type and Reason for Visit: Initial, Positive nutrition screen (MST 2)    Nutrition Recommendations/Plan:   Consider initiating PO diet as Cardiac or as medically appropriate. Ensure HP x1/d (160 kcal, 16 gm PRO). Monitor and document PO intakes, Bms in I/Os. Malnutrition Assessment:  Malnutrition Status:  No malnutrition (09/19/22 1451)    Context:  Acute illness     Findings of the 6 clinical characteristics of malnutrition:   Energy Intake:  No significant decrease in energy intake  Weight Loss:  No significant weight loss     Body Fat Loss:  No significant body fat loss,     Muscle Mass Loss:  No significant muscle mass loss,    Fluid Accumulation:  No significant fluid accumulation,     Strength:  Not performed     Nutrition Assessment:    Admitted for closed interochanteric  L. Femur fx. Noted MST 2 d/t ?wt loss based on wts in chart. Noted h/o wt as 154# 4 mths ago; current wt indicated wt gain vs wt loss. Unable to verify w/ Pt today. Currently NPO for ORIF today. Start diet when medically appropriate as Cardiac, add ONS for s/p sx. Labs: , BUN 23, Cr 1.64, GFR 29. Meds: NS @ 75 mL/hr, humalog, dilaudid. Nutrition Related Findings:    NFPE deferred; appears well nourished. No N/V/D/C nor c/s issues reported per RN. No noted h/o dysphagia. Last BM PTA. No edema. Wound Type: None    Current Nutrition Intake & Therapies:  Average Meal Intake: Unable to assess (No meals since admitted. (LOS <1 day))  Average Supplement Intake: None ordered  DIET NPO Sips of Water with Meds    Anthropometric Measures:  Height: 5' 1\" (154.9 cm)  Ideal Body Weight (IBW): 105 lbs (48 kg)  Admission Body Weight: 291 lb 3.6 oz  Current Body Wt:  132.1 kg (291 lb 3.6 oz) (9/19), 277.4 % IBW.  Bed scale  Current BMI (kg/m2): 55.1  Usual Body Weight: 69.9 kg (154 lb) (~5 months ago per chart review.)  % Weight Change (Calculated): 89.1  BMI Category: Obese class 3 (BMI 40.0 or greater)    Nutrition Diagnosis:   No nutrition diagnosis at this time     Nutrition Interventions:   Food and/or Nutrient Delivery: Continue current diet, Start oral nutrition supplement  Nutrition Education/Counseling: No recommendations at this time  Coordination of Nutrition Care: Continue to monitor while inpatient  Plan of Care discussed with: RN    Goals:  Goals: Meet at least 75% of estimated needs, PO intake 50% or greater, within 7 days, Initiate PO diet    Nutrition Monitoring and Evaluation:   Behavioral-Environmental Outcomes: None identified  Food/Nutrient Intake Outcomes: Diet advancement/tolerance, Food and nutrient intake, Supplement intake  Physical Signs/Symptoms Outcomes: Biochemical data, Skin, Weight, Meal time behavior    Discharge Planning: Too soon to determine    Harinder Falk RD  Contact: ext. [phone] or [employer].

## 2022-09-19 NOTE — ED NOTES
.. TRANSFER - OUT REPORT:    Verbal report given to Tobias Frey RN(name) on [employer]  being transferred to [facility](unit) for routine progression of care       Report consisted of patients Situation, Background, Assessment and   Recommendations(SBAR). Information from the following report(s) SBAR, Kardex, ED Summary, MAR, Accordion and Recent Results was reviewed with the receiving nurse. Lines:   Peripheral IV 09/19/22 Left Hand (Active)        Opportunity for questions and clarification was provided.       Patient transported with:   Registered Nurse

## 2022-09-19 NOTE — PROGRESS NOTES
TRANSFER - OUT REPORT:    Verbal report given to Marcy Bhatia RN (name) on [employer]  being transferred to [address] [place] [address] (unit) for routine post - op       Report consisted of patients Situation, Background, Assessment and   Recommendations(SBAR). Information from the following report(s) OR Summary, Intake/Output, and Quality Measures was reviewed with the receiving nurse. Opportunity for questions and clarification was provided.       Patient transported with:   O2 @ 2 liters  Registered Nurse

## 2022-09-19 NOTE — ED TRIAGE NOTES
Internal Medicine  Progress Note  PGY-3    Hospital Day: 2                                                      Admit Date: 2/13/2021                                     PCP: Marysol Crespo MD      CC: AMS                      Interval Hx: No acute events overnight. -170/30-70s    Daily Plan:  2/14/2021    Subjective: Patient seen and examined at bedside. Patient with severe aphasia, unable to obtain to further history. Medications:    Scheduled Meds:   sodium chloride flush  10 mL Intravenous 2 times per day    insulin lispro  0-12 Units Subcutaneous TID WC    insulin lispro  0-6 Units Subcutaneous Nightly    heparin (porcine)  5,000 Units Subcutaneous 3 times per day    ipratropium-albuterol  1 vial Inhalation 4x daily    insulin glargine  5 Units Subcutaneous Nightly    aspirin  81 mg Oral Daily    atorvastatin  40 mg Oral Nightly    clopidogrel  75 mg Oral Daily    levothyroxine  125 mcg Oral Daily    pantoprazole  40 mg Oral QAM AC    predniSONE  7.5 mg Oral Daily    traZODone  50 mg Oral Nightly    divalproex  250 mg Oral Nightly    lisinopril  40 mg Oral Daily      Continuous Infusions:   dextrose       PRN Meds:LORazepam, albuterol, sodium chloride flush, polyethylene glycol, promethazine **OR** ondansetron, acetaminophen **OR** acetaminophen, glucose, dextrose, glucagon (rDNA), dextrose, perflutren lipid microspheres, labetalol    Allergies: Allergies   Allergen Reactions    Buspar [Buspirone Hcl]          Physical Exam:     Vitals: BP (!) 152/36   Pulse 71   Temp 97.8 °F (36.6 °C) (Oral)   Resp 20   Wt 184 lb 11.9 oz (83.8 kg)   SpO2 97%   BMI 33.79 kg/m²     I/O:      Intake/Output Summary (Last 24 hours) at 2/14/2021 6075  Last data filed at 2/13/2021 2149  Gross per 24 hour   Intake 560 ml   Output    Net 560 ml     Physical Exam  Constitutional:       General: She is not in acute distress. Appearance: She is not ill-appearing, toxic-appearing or diaphoretic. Pt had a ground level fall. Pt was walking to bathroom and she tripped over her pants and fell no loc no blood thinner pt c/o of left hip pain and has skin tears noted on left elbow and upper arm. Comments: Awake, sitting up in bed    HENT:      Head: Normocephalic and atraumatic. Eyes:      General: No scleral icterus. Right eye: No discharge. Left eye: No discharge. Comments: Unable to assess EOM   Neck:      Musculoskeletal: Neck supple. No neck rigidity. Cardiovascular:      Rate and Rhythm: Normal rate and regular rhythm. Heart sounds: Normal heart sounds. No murmur. No friction rub. No gallop. Pulmonary:      Effort: Pulmonary effort is normal. No respiratory distress. Breath sounds: Normal breath sounds. No wheezing, rhonchi or rales. Abdominal:      General: Bowel sounds are normal. There is no distension. Palpations: Abdomen is soft. Tenderness: There is no abdominal tenderness. There is no guarding or rebound. Musculoskeletal:         General: No swelling or tenderness. Skin:     General: Skin is warm and dry. Coloration: Skin is not jaundiced or pale. Neurological:      Comments: Awake, unable to assess orientation. Severe expressive aphasia. Not following commands this morning. Does move all extremities spontaneously.           DATA:       Labs  CBC:   Recent Labs     02/12/21 2035 02/13/21  0549 02/14/21  0434   WBC 13.3* 10.1 13.3*   HGB 14.6 14.0 12.7   HCT 44.3 43.5 40.5    313 270       BMP:   Recent Labs     02/12/21 2035 02/13/21  0549 02/14/21  0434    140 140   K 4.2 3.8 3.8   CL 99 98* 100   CO2 26 27 28   BUN 18 21* 28*   CREATININE 1.0 1.2 1.1   GLUCOSE 153* 250* 144*     LFT's:   Recent Labs     02/12/21 2035   AST 21   ALT 12   BILITOT 0.4   ALKPHOS 91     Troponin:   Recent Labs     02/12/21 2035   TROPONINI <0.01     INR:   Recent Labs     02/12/21 2035   INR 1.14       Urinalysis:  Lab Results   Component Value Date    NITRU Negative 02/12/2021    WBCUA 0-2 02/12/2021    BACTERIA Rare 07/06/2017    RBCUA 5-10 02/12/2021    BLOODU TRACE-INTACT 02/12/2021    SPECGRAV 1.010 02/12/2021 GLUCOSEU Negative 02/12/2021       Radiology:  CT head without contrast   Final Result        Evolving right MCA territory infarct. No hemorrhage, herniation, or mass-   effect. MRI BRAIN WO CONTRAST    (Results Pending)       ASSESSMENT AND PLAN:   Mar Yuen a 76 y. o. female with PMHx significant for COPD (on 5L of O2), CAD s/p stent, DM, Hypothyroidism, and HLD presented with altered mental status.      Acute R MCA Ischemic stroke [facility]): - repeat CT head yesterday with evolving infarct, no hemorrhage. ECHO with no evidence of PFO. - neurology consulted, apprec recs  - goal BP now 140/90  - ASA, plavix, lipitor  - MRI brain  - telemetry, will need cardiac monitor on DC  - PT/OT/SLP   -Lipid level  - Hemoglobin A1c level     Hypertensive Emergency - -794-92-70s overnight. - continue home lisinopril   - PRN labetalol      Chronic Diastolic Heart Failure : no s/s acute exacerbation, ECHO with EF 65-70%  - daily weights  - strict I/O     DM II: Last Hemoglobin A1c 6.2 (07/11/2020).   Takes Metformin 1000 mg twice daily at home.  - lantus 5 units nightly  - MDSSI  - A1c pending  - hypoglycemia protocol  - accuchecks AC/HS     Hypothyroidism: TSH 2/12/21 1.45  - continue home synthroid      Chronic Hypoxic Respiratory Failure 2/2 COPD: She is on 5 L of oxygen at home.   - supplemental O2  - DuoNeb  -Prednisone 7.5 mg daily  - symbicort (on BREO at home)     CAD s/p stent:  - ASA/plavix, lipitor     Psych: Valproic acid level < 2.8  - Depakote 250 twice daily  - Trazodone 15 mg      Code Status:Full Code  FEN: DIET DYSPHAGIA MINCED AND MOIST; Carb Control: 4 carb choices (60 gms)/meal  PPX: subQ heparin  DISPO: GMF  PT/OT Eval Status: Consulted     I will discuss the patient with Driss Zarate MD  -----------------------------  Dara Zarate MD  Internal Medicine Resident, PGY-3

## 2022-09-19 NOTE — CONSULTS
9/19/2022, 10:10 AM        [facility] at [facility]  Phone: [phone]      Cardiology Consultation    9/19/2022, 22:89 AM      Sally Jensen  80 y.o. female  2/17/1932      Admit Date:  9/19/2022    Primary Cardiologist:  Dr. Shashank Avalos  Reason for consult: Preop cardiac clearance for left hip fracture. Requesting provider: Dr. Henriquez Books:      Patient's clinical evaluation does not suggest any active CAD or decompensated CHF. Patient however due to her advanced age and multiple ongoing cardiac risk factors with hx of CHF is moderate  risk for her left hip surgery and general anesthesia. Plan:      I will get preop EKG if not already done. Agree with echocardiogram to have baseline LV function and valve function. Continue Lasix to keep patient euvolemic during perioperative course due to patient's history of congestive heart failure. Nitrates and betablocker as tolerated by hemodynamics. Subjective     This is a 77-year-old female with a history of diabetes mellitus, hypertension, heart failure, CKD, GERD and obesity who was brought to the emergency room after sustaining a GLF from tripping. Patient's work-up in the hospital showed left intertrochanteric fracture and a small tear on left elbow. Patient now is preop for her left fracture repair and general anesthesia and cardiology now has been consulted for preop cardiac clearance. Patient at home is a sedentary lifestyle but denies any chest pain or unusual dyspnea at rest.  Denies any palpitation, chest fluttering or fainting spell.     Past medical, Family & Social History   The following medical history was reviewed    Past Medical History:   Past Medical History:   Diagnosis Date    Arthritis     CHF (congestive heart failure) ([address]     Chronic kidney disease     Diabetes ([address]     Heart failure ([address]     Hypertension       Past Surgical History:   Procedure Laterality Date    HX APPENDECTOMY      HX BILATERAL SALPINGO-OOPHORECTOMY  1971    HX HEENT      lump removal on head    HX HYSTERECTOMY      IR KYPHOPLASTY LUMBAR  5/4/2022       Social History:  Social History     Socioeconomic History    Marital status:      Spouse name: Not on file    Number of children: Not on file    Years of education: Not on file    Highest education level: Not on file   Occupational History    Not on file   Tobacco Use    Smoking status: Never    Smokeless tobacco: Never   Vaping Use    Vaping Use: Never used   Substance and Sexual Activity    Alcohol use: Never    Drug use: Never    Sexual activity: Not on file   Other Topics Concern     Service Not Asked    Blood Transfusions Not Asked    Caffeine Concern Not Asked    Occupational Exposure Not Asked    Hobby Hazards Not Asked    Sleep Concern Not Asked    Stress Concern Not Asked    Weight Concern Not Asked    Special Diet Not Asked    Back Care Not Asked    Exercise Not Asked    Bike Helmet Not Asked    Seat Belt Not Asked    Self-Exams Not Asked   Social History Narrative    ** Merged History Encounter **          Social Determinants of Health     Financial Resource Strain: Not on file   Food Insecurity: Not on file   Transportation Needs: Not on file   Physical Activity: Not on file   Stress: Not on file   Social Connections: Not on file   Intimate Partner Violence: Not on file   Housing Stability: Not on file       Family History:   Family History   Problem Relation Age of Onset    Hypertension Mother        Medications & Allergies     Allergies:   No Known Allergies    Home Medications:  Prior to Admission medications    Medication Sig Start Date End Date Taking?  Authorizing Provider   fexofenadine (ALLEGRA) 180 mg tablet  3/9/22   Provider, Historical   Lantus Solostar U-100 Insulin 100 unit/mL (3 mL) in  3/9/22   Provider, Historical   lisinopriL (PRINIVIL, ZESTRIL) 20 mg tablet  3/9/22   Provider, Historical   Januvia 50 mg tablet 3/9/22   Provider, Historical   aspirin delayed-release 81 mg tablet Take 81 mg by mouth daily. Provider, Historical   allopurinoL (ZYLOPRIM) 300 mg tablet Take 300 mg by mouth daily. Provider, Historical   melatonin 3 mg tablet Take 3 mg by mouth nightly. Provider, Historical   furosemide (LASIX) 40 mg tablet Take 40 mg by mouth daily. Provider, Historical   cholecalciferol, vitamin D3, 50 mcg (2,000 unit) tab Take 1 Tablet by mouth daily. Provider, Historical   hydrALAZINE (APRESOLINE) 50 mg tablet Take 25 mg by mouth three (3) times daily. Other, MD Juan   DULoxetine (CYMBALTA) 60 mg capsule  10/4/21   Juan Christian MD   ferrous sulfate 325 mg (65 mg iron) tablet Take 325 mg by mouth. Juan Christian MD   insulin glargine (LANTUS) 100 unit/mL injection by SubCUTAneous route At bedtime. Juan Christian MD   potassium chloride (KAON 10%) 20 mEq/15 mL solution Take  by mouth daily. Juan Christian MD   verapamil ER (CALAN-SR) 240 mg CR tablet Take 240 mg by mouth nightly. Juan Christian MD         REVIEW OF SYSTEMS    Review of Systems   Constitutional:  Negative for chills, fever and weight loss. HENT:  Negative for congestion and sore throat. Eyes:  Negative for blurred vision and double vision. Respiratory:  Negative for cough, shortness of breath and wheezing. Cardiovascular:  Negative for claudication and PND. Gastrointestinal:  Negative for abdominal pain, blood in stool, nausea and vomiting. Genitourinary:  Negative for dysuria and hematuria. Musculoskeletal:  Positive for falls and joint pain. Negative for myalgias. Left hip pain due to acute intertrochanteric fracture   Skin:  Negative for itching and rash. Neurological:  Negative for dizziness, sensory change, focal weakness, loss of consciousness and headaches. Endo/Heme/Allergies:  Negative for polydipsia. Does not bruise/bleed easily. Psychiatric/Behavioral:  Negative for depression and hallucinations. Objective       Vitals, I/O's, Weight     24 hour vital signs  BP  Min: 138/120  Max: 193/88  Temp  Av.3 °F (36.8 °C)  Min: 98 °F (36.7 °C)  Max: 98.9 °F (37.2 °C)  Capillary Refill could not be evaluated. This SmartLink does not work with rows of the type: Custom List  Resp  Av  Min: 16  Max: 20  SpO2  Av %  Min: 93 %  Max: 98 %  Systolic (26EGX), FTN:328 , Min:138 , QJE:582   Diastolic (04YQZ), BPW:04, Min:74, Max:120    Body mass index is 55.03 kg/m². Intake/Output Summary (Last 24 hours) at 2022 1010  Last data filed at 2022 0608  Gross per 24 hour   Intake 75 ml   Output 150 ml   Net -75 ml     Patient Vitals for the past 120 hrs:   Weight   22 0231 56.7 kg (125 lb)   22 0608 132.1 kg (291 lb 3.6 oz)           Admit weight: Weight: 56.7 kg (125 lb)      Exam       Constitutional Generally well without symptoms, No weight loss, no fever, chills or nausea or vominting, and Well developed, well nourished in no apparent distress   HEENT normal atraumatic, no neck masses, normal thyroid   Cardiovascular regular heart rate, no murmurs, no JVD, S1 and S2 normal, no gallops notedboth carotids normal upstroke without bruits, radial pulses normal, pedal pulses normal both DP's and Pt's,  LE edema Trace   Pulmonary clear to auscultation bilaterally   Abnominal soft, non-tender, without masses or organomegaly, normal bowel sounds, no masses palpated   Genitourinary Deferred   Muskuloskeletal No obvious joint deformities.   No clubbing   Neuro Alert and oriented   Psychiatric Demonstrates good judgement and insight into his or her medical condition   Extremities warm, well perfused   Skin Warm and dry         Labs     Lab Results   Component Value Date/Time    WBC 14.0 (H) 2022 03:04 AM    HGB 11.5 2022 03:04 AM    HCT 35.5 2022 03:04 AM    PLATELET 535  03:04 AM    MCV 93.9 2022 03:04 AM     Lab Results   Component Value Date/Time    Sodium 141 09/19/2022 03:04 AM    Sodium 140 09/19/2022 03:04 AM    Potassium Hemolyzed, recollect requested 09/19/2022 04:41 AM    Chloride 107 09/19/2022 03:04 AM    Chloride 107 09/19/2022 03:04 AM    CO2 27 09/19/2022 03:04 AM    CO2 25 09/19/2022 03:04 AM    Anion gap 7 09/19/2022 03:04 AM    Anion gap 8 09/19/2022 03:04 AM    Glucose 202 (H) 09/19/2022 03:04 AM    Glucose 204 (H) 09/19/2022 03:04 AM    BUN 22 (H) 09/19/2022 03:04 AM    BUN 23 (H) 09/19/2022 03:04 AM    Creatinine 1.64 (H) 09/19/2022 03:04 AM    Creatinine 1.64 (H) 09/19/2022 03:04 AM    BUN/Creatinine ratio 13 09/19/2022 03:04 AM    BUN/Creatinine ratio 14 09/19/2022 03:04 AM    GFR est AA 36 (L) 09/19/2022 03:04 AM    GFR est AA 36 (L) 09/19/2022 03:04 AM    GFR est non-AA 29 (L) 09/19/2022 03:04 AM    GFR est non-AA 29 (L) 09/19/2022 03:04 AM    Calcium 8.8 09/19/2022 03:04 AM    Calcium 8.8 09/19/2022 03:04 AM      Last Lipid:  No results found for: CHOL, CHOLX, CHLST, CHOLV, HDL, HDLP, LDL, LDLC, DLDLP, TGLX, TRIGL, TRIGP, CHHD, CHHDX  No results found for: CPK, RCK1, RCK2, RCK3, RCK4, CKMB, CKNDX, CKND1, TROPT, TROIQ, BNPP, BNP   Lab Results   Component Value Date/Time    TSH 1.31 09/19/2022 03:04 AM          Imaging & Acillary Studies     EKG: Pending      XR CHEST PORT   Final Result   No evidence of acute cardiopulmonary process. XR HIP LT W OR WO PELV 2-3 VWS   Final Result   Intertrochanteric left hip fracture. Echo: Pending      Total time spent: 60 minutes      Dr. Jone Katz.  Nely Correa MD.PhD. [facility]

## 2022-09-19 NOTE — H&P
Hospitalist History & Physical Notes. [facility]. Name : Marco Willett      MRN number : [id number]     YOB: 1932     Subjective :   Chief Complaint : Fall left hip intertrochanteric fracture displaced      Source of information : Patient is hard of hearing but able to give information. From ED provider. History of present illness:   80 y.o. female with history of diabetes mellitus, hypertension, heart failure presents to the emergency room after a fall. She was trying to walk to the bathroom which is very close to her bedroom, tripped and fell. Unable to get up due to pain, found with fracture on evaluation in the emergency room. She denies any pain at this time, has a small tear on left elbow and upper arm. Denies any loss of consciousness, dizziness. Recently found with fracture lumbar spine had kyphoplasty done without any difficulty. Do not have any information about her cardiac status. But patient looks in good condition. Past Medical History:   Diagnosis Date    Arthritis     CHF (congestive heart failure) ([facility]     Chronic kidney disease     Diabetes ([facility]     Heart failure (H[facility])     Hypertension      Past Surgical History:   Procedure Laterality Date    HX APPENDECTOMY      HX BILATERAL SALPINGO-OOPHORECTOMY  1971    HX HEENT      lump removal on head    HX HYSTERECTOMY      IR KYPHOPLASTY LUMBAR  5/4/2022     Family History   Problem Relation Age of Onset    Hypertension Mother       Social History     Tobacco Use    Smoking status: Never    Smokeless tobacco: Never   Substance Use Topics    Alcohol use: Never       Prior to Admission medications    Medication Sig Start Date End Date Taking?  Authorizing Provider   fexofenadine (ALLEGRA) 180 mg tablet  3/9/22   Provider, Historical   Lantus Solostar U-100 Insulin 100 unit/mL (3 mL) inpn  3/9/22   Provider, Historical   lisinopriL (PRINIVIL, ZESTRIL) 20 mg tablet  3/9/22   Provider, Historical Januvia 50 mg tablet  3/9/22   Provider, Historical   aspirin delayed-release 81 mg tablet Take 81 mg by mouth daily. Provider, Historical   allopurinoL (ZYLOPRIM) 300 mg tablet Take 300 mg by mouth daily. Provider, Historical   melatonin 3 mg tablet Take 3 mg by mouth nightly. Provider, Historical   furosemide (Lasix) 40 mg tablet Take 40 mg by mouth daily. Provider, Historical   cholecalciferol, vitamin D3, (Vitamin D3) 50 mcg (2,000 unit) tab Take 1 Tablet by mouth daily. Provider, Historical   hydrALAZINE (APRESOLINE) 50 mg tablet Take 25 mg by mouth three (3) times daily. Gino, MD Juan   DULoxetine (CYMBALTA) 60 mg capsule  10/4/21   Juan Christian MD   ferrous sulfate 325 mg (65 mg iron) tablet Take 325 mg by mouth. Juan Christian MD   insulin glargine (LANTUS) 100 unit/mL injection by SubCUTAneous route At bedtime. Juan Christian MD   potassium chloride (KAON 10%) 20 mEq/15 mL solution Take  by mouth daily. Juan Christian MD   verapamil ER (CALAN-SR) 240 mg CR tablet Take 240 mg by mouth nightly. Juan Christian MD     No Known Allergies          Review of Systems:  Constitutional: Appetite is good, denies weight loss, no fever, no chills, no night sweats. Eye: No recent visual disturbances, no discharge, no double vision. Ear/nose/mouth/throat : Very hard of hearing at baseline. Can read lips. Need to talk to her loud. No ear pain, no nasal congestion, no sore throat, no trouble swallowing. Respiratory : No trouble breathing, no cough, no shortness of breath,  no wheezing. Cardiovascular : No chest pain, no palpitation,  no orthopnea, no  peripheral edema. Gastrointestinal : No nausea, no vomiting,   Genitourinary : No dysuria, no hematuria,   Endocrine : No excessive thirst, No polyuria   Immunologic :  No seasonal allergies. Musculoskeletal : Hip fracture as mentioned. Recent history of lumbar spine compression fracture with no injury.     Integumentary : No rash or itching. Hematology : No bleeding disorders. .  Neurology : Denies change in mental status, No headache, No confusion, No numbness or tingling. Psychiatric : No mood swings, No anxiety, No depression. Vitals:   Patient Vitals for the past 12 hrs:   Temp Pulse Resp BP SpO2   09/19/22 0231 98.2 °F (36.8 °C) 96 18 (!) 193/88 93 %       Physical Exam:   General : Looks tired but comfortable, no respiratory distress noted. HEENT : PERRLA, normal oral mucosa, atraumatic normocephalic, Normal ear and nose. Neck : Supple, no JVD, no masses noted, no carotid bruit. Lungs : Breath sounds with moderate air entry bilaterally, no wheezes or rales, no accessory muscle use. CVS : Rhythm rate regular, S1+, S2+, did not hear any murmur. .  Abdomen : Soft, nontender, bowel sounds active. Extremities : Mild edema noted,  pedal pulses not palpable. Musculoskeletal : no joint swelling or effusion, muscle tone and power appears fair. Skin : Moist, warm, no pathological rash. Lymphatic : No cervical lymphadenopathy. Neurological : Awake, alert, oriented to time place person. No neurological deficits. Psychiatric : Very pleasant. .         Data Review:   Recent Results (from the past 24 hour(s))   CBC WITH AUTOMATED DIFF    Collection Time: 09/19/22  3:04 AM   Result Value Ref Range    WBC 14.0 (H) 3.6 - 11.0 K/uL    RBC 3.78 (L) 3.80 - 5.20 M/uL    HGB 11.5 11.5 - 16.0 g/dL    HCT 35.5 35.0 - 47.0 %    MCV 93.9 80.0 - 99.0 FL    MCH 30.4 26.0 - 34.0 PG    MCHC 32.4 30.0 - 36.5 g/dL    RDW 14.8 (H) 11.5 - 14.5 %    PLATELET 510 140 - 720 K/uL    MPV 9.9 8.9 - 12.9 FL    NRBC 0.0 0.0  WBC    ABSOLUTE NRBC 0.00 0.00 - 0.01 K/uL    NEUTROPHILS 81 (H) 32 - 75 %    LYMPHOCYTES 11 (L) 12 - 49 %    MONOCYTES 5 5 - 13 %    EOSINOPHILS 2 0 - 7 %    BASOPHILS 0 0 - 1 %    IMMATURE GRANULOCYTES 1 (H) 0 - 0.5 %    ABS. NEUTROPHILS 11.3 (H) 1.8 - 8.0 K/UL    ABS. LYMPHOCYTES 1.5 0.8 - 3.5 K/UL    ABS.  MONOCYTES 0.7 0.0 - 1.0 K/UL ABS. EOSINOPHILS 0.3 0.0 - 0.4 K/UL    ABS. BASOPHILS 0.1 0.0 - 0.1 K/UL    ABS. IMM. GRANS. 0.1 (H) 0.00 - 0.04 K/UL    DF AUTOMATED     METABOLIC PANEL, COMPREHENSIVE    Collection Time: 09/19/22  3:04 AM   Result Value Ref Range    Sodium 141 136 - 145 mmol/L    Potassium Hemolyzed, recollect requested 3.5 - 5.1 mmol/L    Chloride 107 97 - 108 mmol/L    CO2 27 21 - 32 mmol/L    Anion gap 7 5 - 15 mmol/L    Glucose 202 (H) 65 - 100 mg/dL    BUN 22 (H) 6 - 20 mg/dL    Creatinine 1.64 (H) 0.55 - 1.02 mg/dL    BUN/Creatinine ratio 13 12 - 20      GFR est AA 36 (L) >60 ml/min/1.73m2    GFR est non-AA 29 (L) >60 ml/min/1.73m2    Calcium 8.8 8.5 - 10.1 mg/dL    Bilirubin, total 0.3 0.2 - 1.0 mg/dL    AST (SGOT) Hemolyzed, recollect requested 15 - 37 U/L    ALT (SGPT) 13 12 - 78 U/L    Alk. phosphatase 67 45 - 117 U/L    Protein, total 6.0 (L) 6.4 - 8.2 g/dL    Albumin 2.8 (L) 3.5 - 5.0 g/dL    Globulin 3.2 2.0 - 4.0 g/dL    A-G Ratio 0.9 (L) 1.1 - 2.2     MAGNESIUM    Collection Time: 09/19/22  3:04 AM   Result Value Ref Range    Magnesium Hemolyzed, recollect requested 1.6 - 2.4 mg/dL   PROTHROMBIN TIME + INR    Collection Time: 09/19/22  3:04 AM   Result Value Ref Range    Prothrombin time 13.9 11.9 - 14.6 sec    INR 1.1 0.9 - 1.1     TYPE & SCREEN    Collection Time: 09/19/22  3:04 AM   Result Value Ref Range    Crossmatch Expiration 09/22/2022,2359     ABO/Rh(D) A Positive     Antibody screen Negative        Radiologic Studies :       CXR Results  (Last 48 hours)                 09/19/22 0315  XR CHEST PORT Final result    Impression:  No evidence of acute cardiopulmonary process. Narrative:  INDICATION: Chest pain       FINDINGS: AP portable imaging of the chest performed at 3:01 AM demonstrates a   normal cardiomediastinal silhouette. The lungs are clear bilaterally. There is   dextroconvex curvature of the thoracic spine, with associated degenerative   changes.                      Assessment and Plan : Left intertrochanteric fracture closed: Consultation placed to orthopedics, will follow with recommendations. Recent history of lumbar spine compression fracture suspected osteoporosis    Benign essential hypertension: Need to verify the home medications. Diabetes mellitus type 2: We will keep her on Accu-Cheks with a sliding scale insulin. Continue sitagliptin from home medication. Held scheduled insulin. Laboratory data from emergency room hemolyzed specimen, completed electrolyte panel not available, we will order for renal function panel stat and follow-up. Admitted to surgical floor, full CODE STATUS, home medications unable to verify, pharmacy and floor nursing staff are discussed about it to call [address] [place] [address] to check the correct medications list.  We will continue meanwhile lisinopril, verapamil. We will keep her on Accu-Cheks with a sliding scale insulin. CC : Lamberto Hensley MD  Signed By: Pretty Jacobson MD     September 19, 2022      This dictation was done by dragon, computer voice recognition software. Often unanticipated grammatical, syntax, [place] phones and other interpretive errors are inadvertently transcribed. Please excuse errors that have escaped final proofreading.

## 2022-09-19 NOTE — PROGRESS NOTES
Hospitalist Progress Note    Subjective:   Daily Progress Note: 9/19/2022 9:23 AM    Hospital Course: Patient is a 80-year-old female with a history of diabetes, hypertension, heart failure that presented to the emergency room on 9/19/2022 after sustaining a fall. Patient denied any dizziness, chest pain, loss of consciousness preceding the event. In the ED patient was found with a left hip fracture. Admitted for orthopedic evaluation. Cardiology consulted for cardiac clearance      Subjective: Patient says that she has some pain in her left hip when she moves.   Denies any chest pain, shortness of breath, cough, abdominal pain, nausea, vomiting    Current Facility-Administered Medications   Medication Dose Route Frequency    sodium chloride (NS) flush 5-40 mL  5-40 mL IntraVENous Q8H    sodium chloride (NS) flush 5-40 mL  5-40 mL IntraVENous PRN    insulin lispro (HUMALOG) injection   SubCUTAneous AC&HS    glucose chewable tablet 16 g  4 Tablet Oral PRN    glucagon (GLUCAGEN) injection 1 mg  1 mg IntraMUSCular PRN    0.9% sodium chloride infusion  75 mL/hr IntraVENous CONTINUOUS    sodium chloride (NS) flush 5-40 mL  5-40 mL IntraVENous Q8H    sodium chloride (NS) flush 5-40 mL  5-40 mL IntraVENous PRN    naloxone (NARCAN) injection 0.4 mg  0.4 mg IntraVENous PRN    dextrose 10% infusion 0-250 mL  0-250 mL IntraVENous PRN    HYDROmorphone (DILAUDID) syringe 0.5 mg  0.5 mg IntraVENous Q6H PRN    verapamil ER (VERELAN) capsule 240 mg  240 mg Oral QHS    lisinopriL (PRINIVIL, ZESTRIL) tablet 20 mg  20 mg Oral DAILY    alogliptin (NESINA) tablet 12.5 mg  12.5 mg Oral DAILY        Review of Systems  Constitutional: No fevers, No chills, No sweats, No fatigue, No Weakness  Eyes: No redness  Ears, nose, mouth, throat, and face: No nasal congestion, No sore throat, No voice change  Respiratory: No Shortness of Breath, No cough, No wheezing  Cardiovascular: No chest pain, No palpitations, No extremity edema  Gastrointestinal: No nausea, No vomiting, No diarrhea, No abdominal pain  Genitourinary: No frequency, No dysuria, No hematuria  Integument/breast: No skin lesion(s)   Neurological: No Confusion, No headaches, No dizziness      Objective:     Visit Vitals  BP (!) 142/74 (BP 1 Location: Left upper arm, BP Patient Position: At rest;Supine)   Pulse 99   Temp 98.9 °F (37.2 °C)   Resp 20   Ht 5' 1\" (1.549 m)   Wt 132.1 kg (291 lb 3.6 oz)   SpO2 98%   Breastfeeding No   BMI 55.03 kg/m²      O2 Device: None (Room air)    Temp (24hrs), Av.3 °F (36.8 °C), Min:98 °F (36.7 °C), Max:98.9 °F (37.2 °C)      No intake/output data recorded.  1901 -  0700  In: 75 [I.V.:75]  Out: 150 [Urine:150]    PHYSICAL EXAM:  Constitutional: No acute distress  Skin: Extremities and face reveal no rashes. HEENT: Sclerae anicteric. Extra-occular muscles are intact. No oral ulcers. The neck is supple and no masses. Cardiovascular: Regular rate and rhythm. Respiratory:  Clear breath sounds bilaterally with no wheezes, rales, or rhonchi. GI: Abdomen nondistended, soft, and nontender. Normal active bowel sounds. Musculoskeletal: No pitting edema of the lower legs. Able to move all ext  Neurological:  Patient is alert and oriented.  Cranial nerves II-XII grossly intact  Psychiatric: Mood appears appropriate       Data Review    Recent Results (from the past 24 hour(s))   CBC WITH AUTOMATED DIFF    Collection Time: 22  3:04 AM   Result Value Ref Range    WBC 14.0 (H) 3.6 - 11.0 K/uL    RBC 3.78 (L) 3.80 - 5.20 M/uL    HGB 11.5 11.5 - 16.0 g/dL    HCT 35.5 35.0 - 47.0 %    MCV 93.9 80.0 - 99.0 FL    MCH 30.4 26.0 - 34.0 PG    MCHC 32.4 30.0 - 36.5 g/dL    RDW 14.8 (H) 11.5 - 14.5 %    PLATELET 981 315 - 426 K/uL    MPV 9.9 8.9 - 12.9 FL    NRBC 0.0 0.0  WBC    ABSOLUTE NRBC 0.00 0.00 - 0.01 K/uL    NEUTROPHILS 81 (H) 32 - 75 %    LYMPHOCYTES 11 (L) 12 - 49 %    MONOCYTES 5 5 - 13 %    EOSINOPHILS 2 0 - 7 % BASOPHILS 0 0 - 1 %    IMMATURE GRANULOCYTES 1 (H) 0 - 0.5 %    ABS. NEUTROPHILS 11.3 (H) 1.8 - 8.0 K/UL    ABS. LYMPHOCYTES 1.5 0.8 - 3.5 K/UL    ABS. MONOCYTES 0.7 0.0 - 1.0 K/UL    ABS. EOSINOPHILS 0.3 0.0 - 0.4 K/UL    ABS. BASOPHILS 0.1 0.0 - 0.1 K/UL    ABS. IMM. GRANS. 0.1 (H) 0.00 - 0.04 K/UL    DF AUTOMATED     METABOLIC PANEL, COMPREHENSIVE    Collection Time: 09/19/22  3:04 AM   Result Value Ref Range    Sodium 141 136 - 145 mmol/L    Potassium Hemolyzed, recollect requested 3.5 - 5.1 mmol/L    Chloride 107 97 - 108 mmol/L    CO2 27 21 - 32 mmol/L    Anion gap 7 5 - 15 mmol/L    Glucose 202 (H) 65 - 100 mg/dL    BUN 22 (H) 6 - 20 mg/dL    Creatinine 1.64 (H) 0.55 - 1.02 mg/dL    BUN/Creatinine ratio 13 12 - 20      GFR est AA 36 (L) >60 ml/min/1.73m2    GFR est non-AA 29 (L) >60 ml/min/1.73m2    Calcium 8.8 8.5 - 10.1 mg/dL    Bilirubin, total 0.3 0.2 - 1.0 mg/dL    AST (SGOT) Hemolyzed, recollect requested 15 - 37 U/L    ALT (SGPT) 13 12 - 78 U/L    Alk.  phosphatase 67 45 - 117 U/L    Protein, total 6.0 (L) 6.4 - 8.2 g/dL    Albumin 2.8 (L) 3.5 - 5.0 g/dL    Globulin 3.2 2.0 - 4.0 g/dL    A-G Ratio 0.9 (L) 1.1 - 2.2     MAGNESIUM    Collection Time: 09/19/22  3:04 AM   Result Value Ref Range    Magnesium Hemolyzed, recollect requested 1.6 - 2.4 mg/dL   PROTHROMBIN TIME + INR    Collection Time: 09/19/22  3:04 AM   Result Value Ref Range    Prothrombin time 13.9 11.9 - 14.6 sec    INR 1.1 0.9 - 1.1     TYPE & SCREEN    Collection Time: 09/19/22  3:04 AM   Result Value Ref Range    Crossmatch Expiration 09/22/2022,2359     ABO/Rh(D) A Positive     Antibody screen Negative    URINALYSIS W/MICROSCOPIC    Collection Time: 09/19/22  3:17 AM   Result Value Ref Range    Color Yellow/Straw      Appearance Clear Clear      Specific gravity 1.010 1.003 - 1.030      pH (UA) 5.0      Protein Negative Negative mg/dL    Glucose Negative Negative mg/dL    Ketone Negative Negative mg/dL    Bilirubin Negative Negative Blood Small (A) Negative      Urobilinogen 0.1 (L) 0.2 - 1.0 EU/dL    Nitrites Negative Negative      Leukocyte Esterase Negative Negative      WBC 0-4 0 - 4 /hpf    RBC 0-5 0 - 5 /hpf    Bacteria Negative Negative /hpf   POTASSIUM    Collection Time: 09/19/22  4:41 AM   Result Value Ref Range    Potassium Hemolyzed, recollect requested 3.5 - 5.1 mmol/L   MAGNESIUM    Collection Time: 09/19/22  4:41 AM   Result Value Ref Range    Magnesium Hemolyzed, recollect requested 1.6 - 2.4 mg/dL   GLUCOSE, POC    Collection Time: 09/19/22  6:22 AM   Result Value Ref Range    Glucose (POC) 146 (H) 65 - 100 mg/dL    Performed by Romel Cornelius, POC    Collection Time: 09/19/22  8:36 AM   Result Value Ref Range    Glucose (POC) 156 (H) 65 - 100 mg/dL    Performed by Ronni Gupta        Radiology review: Chest xray and xray of left hip    Assessment:   1. Left intertrochanteric fracture closed  2. Hypertension with history of CHF  3. Type 2 diabetes    Plan:   1. X-ray of the hip shows a fracture. Orthopedics consulted. Currently NPO. PT and OT postop per orthopedics  2. Blood pressure stable. Continue to monitor per unit protocol. Chest x-ray shows no signs of CHF. Patient is on lisinopril and verapamil. Get a 2D echo. Cardiology consulted for cardiac clearance  3. Hemoglobin A1c is pending. On insulin sliding scale and alogliptin 12.5 mg daily. 4.  CBC BMP in the a.m. Dispo: 48 hours. Barriers include surgery for left hip fracture, postop PT OT, clearance from consulting doctors. Suspect patient will require placement     CODE STATUS full     DVT prophylaxis: Hold at this time due to possible surgery  Ulcer prophylaxis: N.p.o.    Care Plan discussed with: Patient/Family, Nurse, and     Total time spent with patient: 34 minutes.

## 2022-09-19 NOTE — ANESTHESIA PREPROCEDURE EVALUATION
Relevant Problems   No relevant active problems       Anesthetic History   No history of anesthetic complications            Review of Systems / Medical History  Patient summary reviewed, nursing notes reviewed and pertinent labs reviewed    Pulmonary  Within defined limits                 Neuro/Psych             Comments: Hearing loss, hearing aids dependent. Cardiovascular    Hypertension      CHF          Comments: EKG (5-1-22) Sinus rhythm with Premature supraventricular complexes and with occasional   Premature ventricular complexes   Baseline artifact   Left axis deviation   Incomplete right bundle branch block   Minimal voltage criteria for LVH, may be normal variant   Nonspecific ST and T wave abnormality   Abnormal ECG    GI/Hepatic/Renal     GERD    Renal disease (STAGE III KIDNEY DISEASE. ): CRI       Endo/Other    Diabetes    Arthritis    Comments: Dry lips / mucosa. Other Findings   Comments: Ch. BACK PAIN. Closed intertrochanteric fracture of left femur. Physical Exam    Airway    TM Distance: 4 - 6 cm  Neck ROM: normal range of motion   Mouth opening: Diminished (comment)     Cardiovascular    Rhythm: regular  Rate: normal         Dental      Comments: Dentures. Pulmonary  Breath sounds clear to auscultation               Abdominal         Other Findings            Anesthetic Plan    ASA: 3, emergent  Anesthesia type: spinal            Anesthetic plan and risks discussed with: Patient      No versed.

## 2022-09-19 NOTE — ANESTHESIA PROCEDURE NOTES
Spinal Block    Start time: 9/19/2022 3:39 PM  End time: 9/19/2022 4:04 PM  Performed by: Mary Webb MD  Authorized by: Mary Webb MD     Pre-procedure: Indications: primary anesthetic  Preanesthetic Checklist: patient identified, risks and benefits discussed, anesthesia consent, site marked, patient being monitored, timeout performed and fire risk safety assessment completed and verbalized    Timeout Time: 15:13 EDT      Spinal Block:   Patient Position:  Right lateral decubitus  Prep Region:  Lumbar    Prep comment:  Povidone/iodine x 3  Location:  L3-4  Technique:  Single shot  Local: bupivacaine (PF) (MARCAINE) 0.75 % (7.5 mg/mL) Intrathecal - Intrathecal   13.5 mg - 9/19/2022 4:04:00 PM  Local Dose (mL):  2  Med Admin Time: 9/19/2022 4:04 PM    Needle:   Needle Type:  Pencan  Needle Gauge:  25 G  Attempts:  3      Events: CSF confirmed, no blood with aspiration and no paresthesia        Assessment:  Insertion:  Uncomplicated  Patient tolerance:  Patient tolerated the procedure well with no immediate complications  Spinal .  Performed in holding Rm. Strict asepsis,site palpated, lidocaine 1% 2ml local/skin wheel.+ introducer,  No complications. CRNA tried, no success, then I took over.

## 2022-09-19 NOTE — PROGRESS NOTES
Reason for Admission: Closed intertrochanteric fracture of left femur                   RUR Score: 12%                    Plan for utilizing home health: HH in the past, daughter can't remember name of agency. PCP: First and Last name:  Jong Posey MD     Name of Practice:    Are you a current patient: Yes/No:    Approximate date of last visit: 8/25/22- Next appointment is 9/29/22   Can you participate in a virtual visit with your PCP:                     Current Advanced Directive/Advance Care Plan: Full Code    Healthcare Decision Maker:   Click here to complete [address] including selection of the Healthcare Decision Maker Relationship (ie \"Primary\")           Daughter: Catalina Rivera- [phone]  Daughter: Tima Holloway- [phone]                  Transition of Care Plan:  Patient off unit in [facility] [address] [facility] [address]. CM called patients daughter, Ebonie Coleman, to complete assessment. Address verified. Patient was admitted back in May, 2022 for a spinal fracture and went to [address] [place] [address] upon DC. Once DC'd from SNF, patient went to stay with her son and daughter-in-law. DME: walker, wheelchair. Bathroom is handicap accessible. Family assists with all ADL's and IADL's. Daughter wants to await therapy recs before making further decisions.

## 2022-09-19 NOTE — PROGRESS NOTES
Per Dr. Piedad Hinkle request.   James Garcia  to find out about pt.'s med list,but they didn't had any new record of pt.'s med. \  [address] [facility] and talked to Neil's. She verified Pt's med as follow:  1- Duloxetine 90 mg po daily. 2- lisinopril 10 mg po daily. 3- Januvia 50 mg po daily. 4- lantus 14 units SQ QHS. 5- Miralax 17 g daily. 6- Tamadol  50 mg BID. 7- Docusate 100 mg po daily. They also mentioned  pt is not on Verapamil and Metoprolol. Called JAIRO Taylor  NP and she wanted pt. to continue on all these meds but discontinued Verapamil.

## 2022-09-19 NOTE — PROGRESS NOTES
TRANSFER - IN REPORT:    Verbal report received from Mary(name) on [employer]  being received from ER(unit) for routine progression of care      Report consisted of patients Situation, Background, Assessment and   Recommendations(SBAR). Information from the following report(s) SBAR, Kardex, ED Summary, Intake/Output, and MAR was reviewed with the receiving nurse. Opportunity for questions and clarification was provided. Assessment completed upon patients arrival to unit and care assumed.

## 2022-09-19 NOTE — CONSULTS
ORTHOPEDIC CONSULT    Patient: Lluvia Teixeira MRN: [id number]  SSN: [id number]    YOB: 1932  Age: [address]. Sex: female      Subjective:      Lluvia Teixeira is a [address]. female who is being seen in orthopedic consult for left hip fracture. The patient states she lost her footing on a pair of pants in the bathroom and she fell causing injury to her left hip. The patient denies any dizziness or lightheadedness prior to falling. She denies any history of frequent falls. I was able to speak with the patient's daughter Nati Reyna by phone and she states that her mother recently had a kyphoplasty procedure done approximately 2 months ago and she is spent about a month in rehab at [address] She states since that time she has had decreasing ability ambulating. She has been having therapy come to the house twice a week to work with her on this. She does use a walker and wheelchair for mobility. The patient is now complaining of mild pain of her left hip which is aggravated with any movement. She denies any numbness or ting of her left lower extremity. She denies any injury to head or cervical spine from a fall. She denies any other musculoskeletal complaints at this time. Past Medical History:   Diagnosis Date    Arthritis     CHF (congestive heart failure) ([facility]     Chronic kidney disease     Diabetes ([facility]     Heart failure (HCC)     Hypertension      Past Surgical History:   Procedure Laterality Date    HX APPENDECTOMY      HX BILATERAL SALPINGO-OOPHORECTOMY  1971    HX HEENT      lump removal on head    HX HYSTERECTOMY      IR KYPHOPLASTY LUMBAR  5/4/2022      Family History   Problem Relation Age of Onset    Hypertension Mother      Social History     Tobacco Use    Smoking status: Never    Smokeless tobacco: Never   Substance Use Topics    Alcohol use: Never      Prior to Admission medications    Medication Sig Start Date End Date Taking?  Authorizing Provider   neri Bernal 180 mg tablet  3/9/22   Provider, Historical   Lantus Solostar U-100 Insulin 100 unit/mL (3 mL) inpn  3/9/22   Provider, Historical   lisinopriL (PRINIVIL, ZESTRIL) 20 mg tablet  3/9/22   Provider, Historical   Januvia 50 mg tablet  3/9/22   Provider, Historical   aspirin delayed-release 81 mg tablet Take 81 mg by mouth daily. Provider, Historical   allopurinoL (ZYLOPRIM) 300 mg tablet Take 300 mg by mouth daily. Provider, Historical   melatonin 3 mg tablet Take 3 mg by mouth nightly. Provider, Historical   furosemide (LASIX) 40 mg tablet Take 40 mg by mouth daily. Provider, Historical   cholecalciferol, vitamin D3, 50 mcg (2,000 unit) tab Take 1 Tablet by mouth daily. Provider, Historical   hydrALAZINE (APRESOLINE) 50 mg tablet Take 25 mg by mouth three (3) times daily. Other, MD Juan   DULoxetine (CYMBALTA) 60 mg capsule  10/4/21   Gino, MD Juan   ferrous sulfate 325 mg (65 mg iron) tablet Take 325 mg by mouth. Gino, MD Juan   insulin glargine (LANTUS) 100 unit/mL injection by SubCUTAneous route At bedtime. Juan Christian MD   potassium chloride (KAON 10%) 20 mEq/15 mL solution Take  by mouth daily. Juan Christian MD   verapamil ER (CALAN-SR) 240 mg CR tablet Take 240 mg by mouth nightly. Juan Christian MD       No Known Allergies    Review of Systems:  Review of Systems   Constitutional: Negative. HENT: Negative. Eyes: Negative. Respiratory: Negative. Cardiovascular: Negative. Gastrointestinal: Negative. Genitourinary: Negative. Musculoskeletal:  Positive for joint pain. Left hip   Skin: Negative. Neurological: Negative. Endo/Heme/Allergies: Negative. Psychiatric/Behavioral: Negative.          Objective:     Current Facility-Administered Medications   Medication Dose Route Frequency    sodium chloride (NS) flush 5-40 mL  5-40 mL IntraVENous Q8H    sodium chloride (NS) flush 5-40 mL  5-40 mL IntraVENous PRN    insulin lispro (HUMALOG) injection SubCUTAneous AC&HS    glucose chewable tablet 16 g  4 Tablet Oral PRN    glucagon (GLUCAGEN) injection 1 mg  1 mg IntraMUSCular PRN    0.9% sodium chloride infusion  75 mL/hr IntraVENous CONTINUOUS    sodium chloride (NS) flush 5-40 mL  5-40 mL IntraVENous Q8H    sodium chloride (NS) flush 5-40 mL  5-40 mL IntraVENous PRN    naloxone (NARCAN) injection 0.4 mg  0.4 mg IntraVENous PRN    dextrose 10% infusion 0-250 mL  0-250 mL IntraVENous PRN    HYDROmorphone (DILAUDID) syringe 0.5 mg  0.5 mg IntraVENous Q6H PRN    verapamil ER (VERELAN) capsule 240 mg  240 mg Oral QHS    lisinopriL (PRINIVIL, ZESTRIL) tablet 20 mg  20 mg Oral DAILY    alogliptin (NESINA) tablet 12.5 mg  12.5 mg Oral DAILY    nitroglycerin (NITROBID) 2 % ointment 1 Inch  1 Inch Topical Q6H    metoprolol tartrate (LOPRESSOR) tablet 12.5 mg  12.5 mg Oral Q12H      Vitals:    09/19/22 0505 09/19/22 0545 09/19/22 0608 09/19/22 0826   BP: (!) 138/120 (!) 149/92  (!) 142/74   Pulse: (!) 108 (!) 105  99   Resp: 16 18  20   Temp: 98.1 °F (36.7 °C) 98 °F (36.7 °C)  98.9 °F (37.2 °C)   SpO2: 94% 95%  98%   Weight:   132.1 kg (291 lb 3.6 oz)    Height:   5' 1\" (1.549 m)         Alert and oriented x3, No apparent distress    Physical Exam:  Left lower extremity: There is foreshortening and external rotation seen of her left lower extremity compared to the right. There was +1 nonpitting edema seen of her bilateral lower extremities. No calf pain to palpation. No erythema or ecchymosis seen. No tenderness palpation throughout her left lower extremity. DP/PT pulses are palpable. Cap refill is 2 seconds. EHL/DF/PF is 3 out of 5. Negative Homans. Left lower extremity neurovascularly intact.     Labs:  CBC:  Recent Labs     09/19/22  0304   WBC 14.0*   RBC 3.78*   HGB 11.5   HCT 35.5   MCV 93.9   RDW 14.8*        CHEMISTRIES:  Recent Labs     09/19/22  0441 09/19/22  0304   NA  --  140  141   K Hemolyzed, recollect requested 4.4  Hemolyzed, recollect requested   CL  --  107  107   CO2  --  25  27   BUN  --  23*  22*   CREA  --  1.64*  1.64*   CA  --  8.8  8.8   PHOS  --  3.9   MG Hemolyzed, recollect requested Hemolyzed, recollect requested   PT/INR:  Recent Labs     09/19/22  0304   INR 1.1     APTT:No results for input(s): APTT in the last 72 hours. LIVER PROFILE:  Recent Labs     09/19/22  0304   AST Hemolyzed, recollect requested   ALT 13       IMAGING:  X-rays taken of her pelvis and left hip show a displaced intertrochanteric fracture of her left hip. Moderate osteopenia seen throughout pelvic girdle and left proximal femur. No other acute fracture seen. Assessment/Plan:     Hospital Problems  Date Reviewed: 9/19/2022            Codes Class Noted POA    Closed [facility]chanteric fracture of left femur ([facility] ICD-10-CM: E14.988Y  ICD-9-CM: 820.21  9/19/2022 Yes        Hip fracture [facility]) ICD-10-CM: W99.200T  ICD-9-CM: 820.8  9/19/2022 Unknown         Intertrochanteric fracture left hip  I discussed operative versus nonoperative management of her hip fracture with the patient and her daughter Endy Rainey by phone. Risk and benefits of both were explained. The patient and her daughter has decided to proceed with surgical fixation. The procedure, risks benefits were explained in detail including but not limited to: Risk of infection, neurovascular injury, gait disturbance, leg length discrepancy, non-/malunion, pain, risk of DVT, risk of pneumonia, possible need of blood transfusion related risk, reaction to anesthesia and possible death. The patient expresses full understanding and agrees to proceed with surgical intervention. I explained this as well as her daughter Endy Rainey by phone. All questions were answered. We will proceed with surgical intervention today with Dr. Adore Flynn. Because of the patient's history of CHF she may need cardiac evaluation prior to surgery.   Again, she has seen Dr. Cade Delvalle in the past.    This patient was examined direct consult with Dr. Autumn Huston. Thank you for courtesy of this consult.   Signed By: Jim Mo PA-C     September 19, 2022

## 2022-09-20 LAB
ALBUMIN SERPL-MCNC: 2.2 G/DL (ref 3.5–5)
ALBUMIN/GLOB SERPL: 0.9 {RATIO} (ref 1.1–2.2)
ALP SERPL-CCNC: 44 U/L (ref 45–117)
ALT SERPL-CCNC: 8 U/L (ref 12–78)
ANION GAP SERPL CALC-SCNC: 6 MMOL/L (ref 5–15)
AST SERPL W P-5'-P-CCNC: 10 U/L (ref 15–37)
BASOPHILS # BLD: 0.1 K/UL (ref 0–0.1)
BASOPHILS NFR BLD: 1 % (ref 0–1)
BILIRUB SERPL-MCNC: 0.3 MG/DL (ref 0.2–1)
BUN SERPL-MCNC: 25 MG/DL (ref 6–20)
BUN/CREAT SERPL: 17 (ref 12–20)
CA-I BLD-MCNC: 8 MG/DL (ref 8.5–10.1)
CHLORIDE SERPL-SCNC: 110 MMOL/L (ref 97–108)
CO2 SERPL-SCNC: 25 MMOL/L (ref 21–32)
CREAT SERPL-MCNC: 1.43 MG/DL (ref 0.55–1.02)
DIFFERENTIAL METHOD BLD: ABNORMAL
EOSINOPHIL # BLD: 0.3 K/UL (ref 0–0.4)
EOSINOPHIL NFR BLD: 3 % (ref 0–7)
ERYTHROCYTE [DISTWIDTH] IN BLOOD BY AUTOMATED COUNT: 14.6 % (ref 11.5–14.5)
EST. AVERAGE GLUCOSE BLD GHB EST-MCNC: 143 MG/DL
GLOBULIN SER CALC-MCNC: 2.4 G/DL (ref 2–4)
GLUCOSE BLD STRIP.AUTO-MCNC: 129 MG/DL (ref 65–100)
GLUCOSE BLD STRIP.AUTO-MCNC: 129 MG/DL (ref 65–100)
GLUCOSE BLD STRIP.AUTO-MCNC: 134 MG/DL (ref 65–100)
GLUCOSE SERPL-MCNC: 146 MG/DL (ref 65–100)
HBA1C MFR BLD: 6.6 % (ref 4–5.6)
HCT VFR BLD AUTO: 23.8 % (ref 35–47)
HGB BLD-MCNC: 7.5 G/DL (ref 11.5–16)
IMM GRANULOCYTES # BLD AUTO: 0.1 K/UL (ref 0–0.04)
IMM GRANULOCYTES NFR BLD AUTO: 1 % (ref 0–0.5)
LYMPHOCYTES # BLD: 1.7 K/UL (ref 0.8–3.5)
LYMPHOCYTES NFR BLD: 18 % (ref 12–49)
MCH RBC QN AUTO: 29.9 PG (ref 26–34)
MCHC RBC AUTO-ENTMCNC: 31.5 G/DL (ref 30–36.5)
MCV RBC AUTO: 94.8 FL (ref 80–99)
MONOCYTES # BLD: 0.9 K/UL (ref 0–1)
MONOCYTES NFR BLD: 10 % (ref 5–13)
NEUTS SEG # BLD: 6.2 K/UL (ref 1.8–8)
NEUTS SEG NFR BLD: 67 % (ref 32–75)
NRBC # BLD: 0 K/UL (ref 0–0.01)
NRBC BLD-RTO: 0 PER 100 WBC
PERFORMED BY, TECHID: ABNORMAL
PLATELET # BLD AUTO: 153 K/UL (ref 150–400)
PMV BLD AUTO: 10.3 FL (ref 8.9–12.9)
POTASSIUM SERPL-SCNC: 4 MMOL/L (ref 3.5–5.1)
PROT SERPL-MCNC: 4.6 G/DL (ref 6.4–8.2)
RBC # BLD AUTO: 2.51 M/UL (ref 3.8–5.2)
SODIUM SERPL-SCNC: 141 MMOL/L (ref 136–145)
WBC # BLD AUTO: 9.2 K/UL (ref 3.6–11)

## 2022-09-20 PROCEDURE — 94761 N-INVAS EAR/PLS OXIMETRY MLT: CPT

## 2022-09-20 PROCEDURE — 83036 HEMOGLOBIN GLYCOSYLATED A1C: CPT

## 2022-09-20 PROCEDURE — 74011250637 HC RX REV CODE- 250/637: Performed by: INTERNAL MEDICINE

## 2022-09-20 PROCEDURE — 82962 GLUCOSE BLOOD TEST: CPT

## 2022-09-20 PROCEDURE — 74011250636 HC RX REV CODE- 250/636: Performed by: ORTHOPAEDIC SURGERY

## 2022-09-20 PROCEDURE — 74011000250 HC RX REV CODE- 250: Performed by: HOSPITALIST

## 2022-09-20 PROCEDURE — 80053 COMPREHEN METABOLIC PANEL: CPT

## 2022-09-20 PROCEDURE — 74011636637 HC RX REV CODE- 636/637: Performed by: HOSPITALIST

## 2022-09-20 PROCEDURE — 97165 OT EVAL LOW COMPLEX 30 MIN: CPT

## 2022-09-20 PROCEDURE — 74011000250 HC RX REV CODE- 250: Performed by: INTERNAL MEDICINE

## 2022-09-20 PROCEDURE — 74011250637 HC RX REV CODE- 250/637: Performed by: HOSPITALIST

## 2022-09-20 PROCEDURE — 97162 PT EVAL MOD COMPLEX 30 MIN: CPT

## 2022-09-20 PROCEDURE — 74011250637 HC RX REV CODE- 250/637: Performed by: PHYSICIAN ASSISTANT

## 2022-09-20 PROCEDURE — 92610 EVALUATE SWALLOWING FUNCTION: CPT

## 2022-09-20 PROCEDURE — 65270000029 HC RM PRIVATE

## 2022-09-20 PROCEDURE — 85025 COMPLETE CBC W/AUTO DIFF WBC: CPT

## 2022-09-20 PROCEDURE — 97530 THERAPEUTIC ACTIVITIES: CPT

## 2022-09-20 PROCEDURE — 36415 COLL VENOUS BLD VENIPUNCTURE: CPT

## 2022-09-20 PROCEDURE — 74011250636 HC RX REV CODE- 250/636: Performed by: INTERNAL MEDICINE

## 2022-09-20 PROCEDURE — 74011636637 HC RX REV CODE- 636/637: Performed by: PHYSICIAN ASSISTANT

## 2022-09-20 PROCEDURE — 74011250637 HC RX REV CODE- 250/637: Performed by: ORTHOPAEDIC SURGERY

## 2022-09-20 PROCEDURE — 77010033678 HC OXYGEN DAILY

## 2022-09-20 PROCEDURE — 94762 N-INVAS EAR/PLS OXIMTRY CONT: CPT

## 2022-09-20 PROCEDURE — 74011000250 HC RX REV CODE- 250: Performed by: ORTHOPAEDIC SURGERY

## 2022-09-20 RX ADMIN — POLYETHYLENE GLYCOL 3350 17 G: 17 POWDER, FOR SOLUTION ORAL at 09:27

## 2022-09-20 RX ADMIN — METOPROLOL TARTRATE 12.5 MG: 25 TABLET, FILM COATED ORAL at 18:07

## 2022-09-20 RX ADMIN — ACETAMINOPHEN 1000 MG: 500 TABLET ORAL at 12:01

## 2022-09-20 RX ADMIN — SENNOSIDES AND DOCUSATE SODIUM 1 TABLET: 50; 8.6 TABLET ORAL at 20:14

## 2022-09-20 RX ADMIN — TRAMADOL HYDROCHLORIDE 50 MG: 50 TABLET, COATED ORAL at 09:28

## 2022-09-20 RX ADMIN — DOCUSATE SODIUM 100 MG: 100 CAPSULE, LIQUID FILLED ORAL at 09:26

## 2022-09-20 RX ADMIN — ALOGLIPTIN 12.5 MG: 25 TABLET, FILM COATED ORAL at 09:27

## 2022-09-20 RX ADMIN — SODIUM CHLORIDE, PRESERVATIVE FREE 10 ML: 5 INJECTION INTRAVENOUS at 00:22

## 2022-09-20 RX ADMIN — POLYETHYLENE GLYCOL 3350 17 G: 17 POWDER, FOR SOLUTION ORAL at 09:30

## 2022-09-20 RX ADMIN — Medication 1 TABLET: at 18:02

## 2022-09-20 RX ADMIN — INSULIN LISPRO 2 UNITS: 100 INJECTION, SOLUTION INTRAVENOUS; SUBCUTANEOUS at 09:28

## 2022-09-20 RX ADMIN — NITROGLYCERIN 1 INCH: 20 OINTMENT TOPICAL at 12:00

## 2022-09-20 RX ADMIN — SODIUM CHLORIDE, PRESERVATIVE FREE 10 ML: 5 INJECTION INTRAVENOUS at 06:22

## 2022-09-20 RX ADMIN — SODIUM CHLORIDE 125 ML/HR: 9 INJECTION, SOLUTION INTRAVENOUS at 00:31

## 2022-09-20 RX ADMIN — CEFAZOLIN 3 G: 1 INJECTION, POWDER, FOR SOLUTION INTRAMUSCULAR; INTRAVENOUS at 00:22

## 2022-09-20 RX ADMIN — NITROGLYCERIN 1 INCH: 20 OINTMENT TOPICAL at 18:01

## 2022-09-20 RX ADMIN — SENNOSIDES AND DOCUSATE SODIUM 1 TABLET: 50; 8.6 TABLET ORAL at 09:34

## 2022-09-20 RX ADMIN — DULOXETINE 90 MG: 30 CAPSULE, DELAYED RELEASE ORAL at 09:34

## 2022-09-20 RX ADMIN — SODIUM CHLORIDE, PRESERVATIVE FREE 10 ML: 5 INJECTION INTRAVENOUS at 14:03

## 2022-09-20 RX ADMIN — LISINOPRIL 20 MG: 20 TABLET ORAL at 09:29

## 2022-09-20 RX ADMIN — ASPIRIN 81 MG: 81 TABLET, COATED ORAL at 09:29

## 2022-09-20 RX ADMIN — ASPIRIN 81 MG: 81 TABLET, COATED ORAL at 20:14

## 2022-09-20 RX ADMIN — ACETAMINOPHEN 1000 MG: 500 TABLET ORAL at 18:02

## 2022-09-20 RX ADMIN — TRAMADOL HYDROCHLORIDE 50 MG: 50 TABLET, COATED ORAL at 20:13

## 2022-09-20 RX ADMIN — ACETAMINOPHEN 1000 MG: 500 TABLET ORAL at 00:21

## 2022-09-20 RX ADMIN — CEFAZOLIN 3 G: 1 INJECTION, POWDER, FOR SOLUTION INTRAMUSCULAR; INTRAVENOUS at 09:27

## 2022-09-20 RX ADMIN — SODIUM CHLORIDE, PRESERVATIVE FREE 10 ML: 5 INJECTION INTRAVENOUS at 21:45

## 2022-09-20 RX ADMIN — ACETAMINOPHEN 1000 MG: 500 TABLET ORAL at 06:22

## 2022-09-20 RX ADMIN — Medication 1 TABLET: at 09:27

## 2022-09-20 RX ADMIN — INSULIN GLARGINE 14 UNITS: 100 INJECTION, SOLUTION SUBCUTANEOUS at 21:50

## 2022-09-20 RX ADMIN — Medication 1 TABLET: at 12:01

## 2022-09-20 NOTE — PROGRESS NOTES
Problem: Dysphagia (Adult)  Goal: *Acute Goals and Plan of Care (Insert Text)  Description: Speech Therapy Goals  Initiated 9/20/2022  -Patient stated goal: Pt wants to be able to open mouth normally  -Patient will tolerate soft and bite size diet with thin liquids without signs/symptoms of aspiration given no cues within 7day(s). [ ] Not met  [ ]  MET   [ ] Progressing  [ ] Mariaa Freeman  -Patient will demonstrate understanding of swallow safety precautions and aspiration precautions, diet recs with no cues within 7 day(s). [ ] Not met  [ ]  MET   [ ] Progressing  [ ] Discontinue  Outcome: Not Met   SPEECH [address] EVALUATION  Patient: Ita Ahumada [de-identified]80 y.o. female)  Date: 9/20/2022  Primary Diagnosis: Hip fracture ([facility] [S72.009A]  Procedure(s) (LRB):  Open Reduction Internal Fixation Left Hip (Left) 1 Day Post-Op   Precautions: aspiration, hip       ASSESSMENT :  Based on the objective data described below, the patient presents with mild oral dysphagia. Pt seen for bedside sw evaluation, requested by family for PO diet recs. Pt reportedly with trismus c/b jaw stiffness and limited oral opening that started following recent ORIF. This has happened following prior surgery as well and resolved without intervention. Hx includes arthritis. Pt with top and bottom dentures, oral motor function WFL aside from limited oral opening. Unable to assess posterior oral cavity. Lingual tremulousness is noted when pt is accepting bolus. Vocal quality weak but functional.   Upon palpation, pt denies pain/discomfort near TMJ, but does report discomfort with palpation along body of mandible, bilaterally. Palpation instigates pain that resolves when palpation is ceased. Pt does report some difficulty placing dentures this date. Dentures are present for assessment. Pt given trials of thin via straw, jello and soft solids.   Pt is unable to accept rounded bolus from spoon but tolerates flat boluses without difficulty. Pt independently takes small bites and demonstrates slowed/careful mastication. Pt is able to clear oral cavity. Pharyngeal phase WFL. Pt tolerates all without overt s/s aspiration. Pt denies hx of dysphagia. Patient will benefit from skilled intervention to address the above impairments. Patients rehabilitation potential is considered to be Good     PLAN :  Recommendations and Planned Interventions:  Recommend diet downgrade to soft and bite size/thin with meds as tolerated, crushed if able in puree if pt is unable to tolerate whole. Pt is appropriate for small bites of flat crackers/cookies if able to bite. If pt is unable to safety place and remove dentures, recommend keep dentures out and notify SLP for re-assessment for modification of PO diet recs. Will cont to follow with reduced frequency of tx for diet tolerance and sw safely. Anticipate trismus to resolve spontaneously; however, recommend continued SLP follow-up if trismus persists. Frequency/Duration: Patient will be followed by speech-language pathology 2 times a week to address goals. Discharge Recommendations: cont SLP tx at this time. SUBJECTIVE:   Patient alert, agreeable, pleasant. OBJECTIVE:     CXR Results  (Last 48 hours)                 09/19/22 0315  XR CHEST PORT Final result    Impression:  No evidence of acute cardiopulmonary process. Narrative:  INDICATION: Chest pain       FINDINGS: AP portable imaging of the chest performed at 3:01 AM demonstrates a   normal cardiomediastinal silhouette. The lungs are clear bilaterally. There is   dextroconvex curvature of the thoracic spine, with associated degenerative   changes.                   Past Medical History:   Diagnosis Date    Arthritis     CHF (congestive heart failure) ([address]     Chronic kidney disease     Diabetes ([address]     Heart failure ([address]     Hypertension      Past Surgical History:   Procedure Laterality Date    HX APPENDECTOMY      HX BILATERAL SALPINGO-OOPHORECTOMY  1971    HX HEENT      lump removal on head    HX HYSTERECTOMY      IR KYPHOPLASTY LUMBAR  5/4/2022     Prior Level of Function/Home Situation:   Home Situation  Home Environment: Trailer/mobile home  # Steps to Enter: 2  Rails to Enter: Yes  Hand Rails : Bilateral  Wheelchair Ramp: Yes  One/Two Story Residence: One story  Living Alone: No  Support Systems: Child(christopher) (Son and daughter in law)  Patient Expects to be Discharged to[de-identified] Rehab Unit Subacute  Current DME Used/Available at Home: Nick Opal, rolling, Cane, straight, Wheelchair  Tub or Shower Type: Tub/Shower combination  Diet prior to admission: regular/thin  Current Diet:  regular/thin   Cognitive and Communication Status:  Neurologic State: Alert  Orientation Level: Oriented to person, Oriented to place  Cognition: Follows commands        Safety/Judgement: Awareness of environment    Pain:  Pain Scale 1: Numeric (0 - 10)     After treatment:   Patient left in no apparent distress in bed, Call bell within reach, Nursing notified, and Bed / chair alarm activated    COMMUNICATION/EDUCATION:   Patient was educated regarding purpose of SLP assessment, POC, diet recs and sw safety precautions. Patient demonstrated Good understanding as evidenced by verbal responsiveness, response to questions, demonstration. The patient's plan of care including recommendations, planned interventions, and recommended diet changes were discussed with:  PA .     Patient/family have participated as able in goal setting and plan of care. Patient/family agree to work toward stated goals and plan of care.     Thank you for this referral.  Oly Giordano M.S. CCC-SLP  Time Calculation: 18 mins

## 2022-09-20 NOTE — PROGRESS NOTES
PHYSICAL THERAPY EVALUATION  Patient: Kenyetta Parmar (18 y.o. female)  Date: 9/20/2022  Primary Diagnosis: Hip fracture ([facility] [address] [S72.009A]  Procedure(s) (LRB):  Open Reduction Internal Fixation Left Hip (Left) 1 Day Post-Op   Precautions: WBAT LLE       ASSESSMENT  This is a 80 yrs old female adm s/p fall and Fx left hip she is s/p ORIF and WBAT. PMH: As below. Pt A&O x 3. Please refer to flow sheet for home info. Based on the objective data described below, the patient presents with generalized weakness, impaired functional mobility, impaired amb, impaired balance, and decreased endurance to activities. Pt semi supine  upon PT arrival and working with OT to get OOB, agreeable to PT evaluation. Please refer to flow sheet for mobility assessment. Pt amb 4 feet with gt belt, RW, and mod; demonstrating shuffling gt pattern with constant support required. Pt did good with session today with PT/OT. Pt will benefit from continued skilled PT to address above deficits and return to PLOF. Current PT DC recommendation IRF due to above deficits. Other factors to consider for discharge: IRF      PLAN :  Recommendations and Planned Interventions: bed mobility training, transfer training, gait training, therapeutic exercises, patient and family training/education, and therapeutic activities          Frequency/Duration: Patient will be followed by physical therapy:  3-5x/week to address goals. Recommendation for discharge: (in order for the patient to meet his/her long term goals)  [address]     This discharge recommendation:  Has been made in collaboration with the attending provider and/or case management    IF patient discharges home will need the following DME: rolling walker         SUBJECTIVE:   Patient stated I am ok.     OBJECTIVE DATA SUMMARY:   HISTORY:    Past Medical History:   Diagnosis Date    Arthritis     CHF (congestive heart failure) ([facility] [address]     Chronic kidney disease     Diabetes ([facility] [address]     Heart failure ([facility] [address]     Hypertension      Past Surgical History:   Procedure Laterality Date    HX APPENDECTOMY      HX BILATERAL SALPINGO-OOPHORECTOMY  1971    HX HEENT      lump removal on head    HX HYSTERECTOMY      IR KYPHOPLASTY LUMBAR  5/4/2022       Home Situation  Home Environment: Trailer/mobile home  # Steps to Enter: 2  Rails to Enter: Yes  Hand Rails : Bilateral  Wheelchair Ramp: Yes  One/Two Story Residence: One story  Living Alone: No  Support Systems: Child(christopher) (Son and daughter in law)  Patient Expects to be Discharged to[de-identified] Rehab Unit Subacute  Current DME Used/Available at Home: Alyssa Favia, rolling, Cane, straight, Wheelchair  Tub or Shower Type: Tub/Shower combination    EXAMINATION/PRESENTATION/DECISION MAKING:   Critical Behavior:  Neurologic State: Alert  Orientation Level: Oriented to person, Oriented to place, Oriented to time  Cognition: Follows commands  Safety/Judgement: Awareness of environment  Hearing: Auditory  Auditory Impairment: Hard of hearing, bilateral, Hearing aid(s)  Hearing Aids/Status: At home  Skin:    Edema:   Range Of Motion:  AROM: Generally decreased, functional                       Strength:    Strength: Generally decreased, functional                    Tone & Sensation:                                  Coordination:  Coordination: Generally decreased, functional       Functional Mobility:  Bed Mobility:  Rolling: Minimum assistance  Supine to Sit: Minimum assistance     Scooting: Minimum assistance; Additional time  Transfers:  Sit to Stand: Minimum assistance  Stand to Sit: Minimum assistance        Bed to Chair: Minimum assistance; Additional time              Balance:   Sitting: Intact; Without support  Standing: Impaired; With support  Standing - Static: Fair;Constant support  Standing - Dynamic : Fair;Constant support  Ambulation/Gait Training:              Gait Description (WDL): Exceptions to [facility]                                       Functional Measure:  [address] [place] [id number] AM-PAC 6 Clicks         Basic Mobility Inpatient Short Form  How much difficulty does the patient currently have. .. Unable A Lot A Little None   1. Turning over in bed (including adjusting bedclothes, sheets and blankets)? [] 1   [x] 2   [] 3   [] 4   2. Sitting down on and standing up from a chair with arms ( e.g., wheelchair, bedside commode, etc.)   [] 1   [x] 2   [] 3   [] 4   3. Moving from lying on back to sitting on the side of the bed? [] 1   [x] 2   [] 3   [] 4          How much help from another person does the patient currently need. .. Total A Lot A Little None   4. Moving to and from a bed to a chair (including a wheelchair)? [] 1   [x] 2   [] 3   [] 4   5. Need to walk in hospital room? [] 1   [x] 2   [] 3   [] 4   6. Climbing 3-5 steps with a railing? [] 1   [x] 2   [] 3   [] 4   © , Trustees of [address] [place] [id number], under license to [employer]. All rights reserved     Score:  Initial:  Most Recent: X (Date: 2022 )   Interpretation of Tool:  Represents activities that are increasingly more difficult (i.e. Bed mobility, Transfers, Gait).   Score 24 23 22-20 19-15 14-10 9-7 6   Modifier CH CI CJ CK CL CM CN         Physical Therapy Evaluation Charge Determination   History Examination Presentation Decision-Making   LOW Complexity : Zero comorbidities / personal factors that will impact the outcome / POC LOW Complexity : 1-2 Standardized tests and measures addressing body structure, function, activity limitation and / or participation in recreation  LOW Complexity : Stable, uncomplicated  Other outcome measures [facility] 6        Based on the above components, the patient evaluation is determined to be of the following complexity level: MEDIUM    Pain Ratin/10     Activity Tolerance:   Good    After treatment patient left in no apparent distress:   Bed returned to lowest position, Sitting in chair and Call bell within reach Ellie Petty GOALS:    Problem: Mobility Impaired (Adult and Pediatric)  Goal: *Acute Goals and Plan of Care (Insert Text)  Description: Patient stated goal: I want to go home  Patient will move from supine to sit and sit to supine , scoot up and down, and roll side to side in bed with minimal assistance/contact guard assist within 7 day(s). Patient will transfer from bed to chair and chair to bed with minimal assistance/contact guard assist using the least restrictive device within 7 day(s). Patient will improve static standing balance to minimal assistance within 1 week(s). Patient will ambulate 40 feet with minimal assistance with least restrictive device within 1 weeks. Outcome: Progressing Towards Goal       COMMUNICATION/EDUCATION:   The patients plan of care was discussed with: Occupational therapist, Registered nurse, and Case management. Fall prevention education was provided and the patient/caregiver indicated understanding., Patient/family have participated as able in goal setting and plan of care. , and Patient/family agree to work toward stated goals and plan of care. Thank you for this referral.  Kyleigh Monterroso PT.    Time Calculation: 20 mins

## 2022-09-20 NOTE — PROGRESS NOTES
Problem: Falls - Risk of  Goal: *Absence of Falls  Description: Document Talib Navarro Fall Risk and appropriate interventions in the flowsheet. Outcome: Progressing Towards Goal  Note: Fall Risk Interventions:  Mobility Interventions: Patient to call before getting OOB         Medication Interventions: Bed/chair exit alarm    Elimination Interventions: Call light in reach, Bed/chair exit alarm              Problem: Patient Education: Go to Patient Education Activity  Goal: Patient/Family Education  Outcome: Progressing Towards Goal     Problem: Pressure Injury - Risk of  Goal: *Prevention of pressure injury  Description: Document Chris Scale and appropriate interventions in the flowsheet.   Outcome: Progressing Towards Goal  Note: Pressure Injury Interventions:  Sensory Interventions: Minimize linen layers    Moisture Interventions: Absorbent underpads    Activity Interventions: PT/OT evaluation    Mobility Interventions: PT/OT evaluation    Nutrition Interventions: Document food/fluid/supplement intake    Friction and Shear Interventions: HOB 30 degrees or less                Problem: Patient Education: Go to Patient Education Activity  Goal: Patient/Family Education  Outcome: Progressing Towards Goal

## 2022-09-20 NOTE — PROGRESS NOTES
0750: Chart reviewed. Per MD notes, patient POD#1 Closed Reduction and Intramedullary Nailing of Left Hip Intertrochanteric Everlina Edin. PT/OT guy pending discharge recs. (s):  Daughters:   Suellen Mahoney [phone]  Kandi Vargas [phone]    CM will continue to follow patient and recs of medical team.    1100: CM met with patient sitting up in recliner with daughters and son-in-law at side. Therapy recs for IRF discussed. Patient and family would like referral sent to [facility]. Choice letter signed and placed on chart. Referral sent via [facility]. CM discussed referral with [facility] Liaison on unit.

## 2022-09-20 NOTE — PROGRESS NOTES
Patient educated to use call bell for assistance to prevent falls and for pain management. Patient verbalized understanding.

## 2022-09-20 NOTE — PROGRESS NOTES
Orthopedic progress note    Date:2022       CDUV:[id number]  Patient Albina Pacheco     YOB: 1932     Age:89 y.o. Subjective    20-year-old female status post ORIF left hip. Postop day #1. Patient doing well. She is sitting up in a chair at bedside. Family members are present. She participated with therapy this morning and tolerated that well. She is complaining of low back pain which has been present prior to surgery. Patient also complaining of difficulty opening her mouth and chewing food after surgery. She states this has happened before to her in the past when she had back surgery. No other complaints at this time.   Objective           Vitals Last 24 Hours:  TEMPERATURE:  Temp  Av.2 °F (36.8 °C)  Min: 97.1 °F (36.2 °C)  Max: 99.3 °F (37.4 °C)  RESPIRATIONS RANGE: Resp  Av.4  Min: 14  Max: 19  PULSE OXIMETRY RANGE: SpO2  Av.9 %  Min: 9 %  Max: 99 %  PULSE RANGE: Pulse  Av.3  Min: 78  Max: 101  BLOOD PRESSURE RANGE: Systolic (82UJT), BZR:341 , Min:129 , FRT:808   ; Diastolic (41EWV), DUM:55, Min:57, Max:84    Current Facility-Administered Medications   Medication Dose Route Frequency    sodium chloride (NS) flush 5-40 mL  5-40 mL IntraVENous Q8H    insulin lispro (HUMALOG) injection   SubCUTAneous AC&HS    glucose chewable tablet 16 g  4 Tablet Oral PRN    glucagon (GLUCAGEN) injection 1 mg  1 mg IntraMUSCular PRN    sodium chloride (NS) flush 5-40 mL  5-40 mL IntraVENous Q8H    naloxone (NARCAN) injection 0.4 mg  0.4 mg IntraVENous PRN    dextrose 10% infusion 0-250 mL  0-250 mL IntraVENous PRN    HYDROmorphone (DILAUDID) syringe 0.5 mg  0.5 mg IntraVENous Q6H PRN    lisinopriL (PRINIVIL, ZESTRIL) tablet 20 mg  20 mg Oral DAILY    alogliptin (NESINA) tablet 12.5 mg  12.5 mg Oral DAILY    nitroglycerin (NITROBID) 2 % ointment 1 Inch  1 Inch Topical Q6H    metoprolol tartrate (LOPRESSOR) tablet 12.5 mg  12.5 mg Oral Q12H    traMADoL (ULTRAM) tablet 50 mg  50 mg Oral Q6H PRN    acetaminophen (TYLENOL) tablet 1,000 mg  1,000 mg Oral Q6H    sodium chloride (NS) flush 5-40 mL  5-40 mL IntraVENous Q8H    sodium chloride (NS) flush 5-40 mL  5-40 mL IntraVENous PRN    naloxone (NARCAN) injection 0.4 mg  0.4 mg IntraVENous PRN    calcium-vitamin D 600 mg-5 mcg (200 unit) per tablet 1 Tablet  1 Tablet Oral TID WITH MEALS    senna-docusate (PERICOLACE) 8.6-50 mg per tablet 1 Tablet  1 Tablet Oral BID    polyethylene glycol (MIRALAX) packet 17 g  17 g Oral DAILY    [START ON 9/21/2022] bisacodyL (DULCOLAX) suppository 10 mg  10 mg Rectal DAILY PRN    aspirin delayed-release tablet 81 mg  81 mg Oral BID    DULoxetine (CYMBALTA) capsule 90 mg  90 mg Oral DAILY    insulin glargine (LANTUS) injection 14 Units  14 Units SubCUTAneous QHS    polyethylene glycol (MIRALAX) packet 17 g  17 g Oral DAILY    traMADoL (ULTRAM) tablet 50 mg  50 mg Oral BID    docusate sodium (COLACE) capsule 100 mg  100 mg Oral DAILY      Review of Systems   Constitutional: Negative for malaise/fatigue. Respiratory: Negative for cough, shortness of breath and wheezing. Cardiovascular: Negative for chest pain and palpitations. Gastrointestinal: Negative for abdominal pain, heartburn, nausea and vomiting. Neurological: Negative for headaches. Musculoskeletal: Denies any numbness/tingling of operative extremity     I/O (24Hr): Intake/Output Summary (Last 24 hours) at 9/20/2022 1253  Last data filed at 9/20/2022 0626  Gross per 24 hour   Intake 1750 ml   Output 1100 ml   Net 650 ml     Objective:  Vital signs: (most recent): Blood pressure (!) 132/57, pulse 78, temperature 98.8 °F (37.1 °C), resp. rate 16, height 5' 1\" (1.549 m), weight 132.1 kg (291 lb 3.6 oz), SpO2 96 %, not currently breastfeeding.     Labs/Imaging/Diagnostics    Labs:  CBC:  Recent Labs     09/20/22  0740 09/19/22  0304   WBC 9.2 14.0*   RBC 2.51* 3.78*   HGB 7.5* 11.5   HCT 23.8* 35.5   MCV 94.8 93.9   RDW 14.6* 14.8*    243 CHEMISTRIES:  Recent Labs     09/20/22  0740 09/19/22  0441 09/19/22  0304     --  140  141   K 4.0 Hemolyzed, recollect requested 4.4  Hemolyzed, recollect requested   *  --  107  107   CO2 25  --  25  27   BUN 25*  --  23*  22*   CREA 1.43*  --  1.64*  1.64*   CA 8.0*  --  8.8  8.8   PHOS  --   --  3.9   MG  --  Hemolyzed, recollect requested Hemolyzed, recollect requested   PT/INR:  Recent Labs     09/19/22  0304   INR 1.1     APTT:No results for input(s): APTT in the last 72 hours. LIVER PROFILE:  Recent Labs     09/20/22  0740 09/19/22  0304   AST 10* Hemolyzed, recollect requested   ALT 8* 13     Lab Results   Component Value Date/Time    ALT (SGPT) 8 (L) 09/20/2022 07:40 AM    AST (SGOT) 10 (L) 09/20/2022 07:40 AM    Alk. phosphatase 44 (L) 09/20/2022 07:40 AM    Bilirubin, total 0.3 09/20/2022 07:40 AM       Physical Exam:  Left hip: Wound dressing clean dry and intact. Normal swelling to the left thigh. No tenderness palpation about left thigh. Mild tenderness palpation to the calf region. No palpable cords. Negative Homans. DP/PT pulse are palpable. Cap refill is 2 seconds. Left lower extremity neurovascularly intact. HEENT: He does have decreased in ability to open her mouth. There is no tenderness to palpation of the TMJ or masseter region. No oral or pharyngeal swelling seen. Assessment//Plan           Patient Active Problem List    Diagnosis Date Noted    Closed intertrochanteric fracture of left femur ([address] 09/19/2022    Hip fracture ([address] 09/19/2022    Compression fracture of L2 lumbar vertebra ([address] 05/02/2022    Compression fracture of L2 ([address] 05/01/2022     Status post ORIF left hip. Postop day #1. Continue with therapy as tolerated. Spirometer and exercises encouraged. Family is requesting to have speech therapy evaluate patient for alternate diet since she is having difficulty chewing. Acute blood loss anemia. Asymptomatic.   Continue to monitor. Anticipate patient be ready for discharge by tomorrow.       Electronically signed by Jam Rodriguez PA-C on 9/20/2022 at 12:53 PM

## 2022-09-20 NOTE — PROGRESS NOTES
OCCUPATIONAL THERAPY EVALUATION  Patient: Fe Becerra (67 y.o. female)  Date: 9/20/2022  Primary Diagnosis: Hip fracture ([facility] [address] [S72.009A]  Procedure(s) (LRB):  Open Reduction Internal Fixation Left Hip (Left) 1 Day Post-Op   Precautions: WBAT LLE, fall risk       ASSESSMENT  Pt is a 81 y/o F with PMH of heart failure, arthritis, HTN, CKD, DM, and CHF presenting to [facility] with c/o a fall, admitted 09/19/22 and being treated for L intertrochanteric fracture, recent hx of lumbar spine compression fracture and s/p kyphoplasty on 5/4/22, benign essential HTN, and DM II. Pt received semi-supine in bed upon arrival, AXO to person and time (cuing for month) and disoriented to place, and agreeable to OT and PT evaluation at this time. Per pt report, pt lives with her son and daughter in law in a mobile home with 2 MARQUES, B HR, and a ramp entrance, was IND with ADLs and Mod I using SPC for mobility at [facility]. Pt has a tub/shower combination, however, reports she has been sponge bathing d/t fear of falling in the shower. Other DME owned includes: WC, RW, grab bars     Based on current observations, pt presents with deficits in generalized strength/AROM, balance, functional activity tolerance, coordination, cognition/confusion, and increased pain impacting overall performance of ADLs and functional transfers/mobility. Pt currently req's total A to don socks while supine in bed. Pt completes bed mobility (rolling, sup>sit EOB, and scooting) with min A and additional time. Sit EOB>stand with RW completed with min A and SPT from EOB>chair using RW completed with min A. Stand with RW>sit in chair completed with min A. Pt then washed face with set up A to hand pt items. Pt washed dentures with set up A to place items on pt's table with pt demo'ing decreased  strength as well as FM and GM coordination during task resulting in difficulty squeezing toothpaste and managing toothbrush on dentures while brushing.  Overall, pt tolerates session fair. Pt would benefit from continued skilled OT services to address current impairments and improve IND and safety with self cares and functional transfers/mobility. Recommend discharge to IRF once medically appropriate. Other factors to consider for discharge: home support, PLOF, severity of deficits     Patient will benefit from skilled therapy intervention to address the above noted impairments. PLAN :  Recommendations and Planned Interventions: self care training, functional mobility training, therapeutic exercise, balance training, therapeutic activities, endurance activities, patient education, and home safety training    Frequency/Duration: Patient will be followed by occupational therapy:  3-5x/week to address goals. Recommendation for discharge: (in order for the patient to meet his/her long term goals)  [address] [facility]     This discharge recommendation:  Has been made in collaboration with the attending provider and/or case management    IF patient discharges home will need the following DME: TBD; pt may benefit from AE: long handled bathing, AE: long handled dressing, and shower chair       SUBJECTIVE:   Patient stated I am okay.     OBJECTIVE DATA SUMMARY:   HISTORY:   Past Medical History:   Diagnosis Date    Arthritis     CHF (congestive heart failure) ([facility] [address]     Chronic kidney disease     Diabetes ([facility] [address]     Heart failure ([facility] [address]     Hypertension      Past Surgical History:   Procedure Laterality Date    HX APPENDECTOMY      HX BILATERAL SALPINGO-OOPHORECTOMY  1971    HX HEENT      lump removal on head    HX HYSTERECTOMY      IR KYPHOPLASTY LUMBAR  5/4/2022       Expanded or extensive additional review of patient history:     Home Situation  Home Environment: Trailer/mobile home  # Steps to Enter: 2  Rails to Enter: Yes  Hand Rails : Bilateral  Wheelchair Ramp: Yes  One/Two Story Residence: One story  Living Alone: No  Support Systems: Child(christopher) (Son and daughter in law)  Patient Expects to be Discharged to[de-identified] Rehab Unit Subacute  Current DME Used/Available at Home: Richerd Williamson, rolling, Cane, straight, Wheelchair  Tub or Shower Type: Tub/Shower combination    Hand dominance: Right    EXAMINATION OF PERFORMANCE DEFICITS:  Cognitive/Behavioral Status:  Neurologic State: Alert  Orientation Level: Oriented to person;Oriented to time;Disoriented to place (Oriented to month with cues)  Cognition: Follows commands; Appropriate decision making; Appropriate for age attention/concentration        Safety/Judgement: Awareness of environment    Hearing: Auditory  Auditory Impairment: Hard of hearing, bilateral, Hearing aid(s)  Hearing Aids/Status: At home    Range of Motion:  AROM: Generally decreased, functional    Strength:  Strength: Generally decreased, functional    Coordination:  Coordination: Generally decreased, functional  Fine Motor Skills-Upper: Left Impaired;Right Impaired    Gross Motor Skills-Upper: Left Impaired;Right Impaired    Balance:  Sitting: Intact; Without support  Standing: Impaired; With support  Standing - Static: Fair;Constant support  Standing - Dynamic : Fair;Constant support    Functional Mobility and Transfers for ADLs:  Bed Mobility:  Rolling: Minimum assistance  Supine to Sit: Minimum assistance  Scooting: Minimum assistance; Additional time    Transfers:  Sit to Stand: Minimum assistance  Stand to Sit: Minimum assistance  Bed to Chair: Minimum assistance; Additional time    ADL Intervention and task modifications:  Grooming  Position Performed: Seated in chair  Washing Face: Set-up  Brushing Teeth: Set-up (Dentures)    Lower Body Dressing Assistance  Socks: Total assistance (dependent)    Cognitive Retraining  Safety/Judgement: Awareness of environment    Therapeutic Exercise:  Pt would benefit from UE HEP initiated at next session in prep for ADLs and functional mobility/transfers.      Functional Measure:    [employer] \"6 Clicks\" Daily Activity Inpatient Short Form  How much help from another person does the patient currently need. .. Total; A Lot A Little None   1. Putting on and taking off regular lower body clothing? [x]  1 []  2 []  3 []  4   2. Bathing (including washing, rinsing, drying)? []  1 [x]  2 []  3 []  4   3. Toileting, which includes using toilet, bedpan or urinal? [] 1 [x]  2 []  3 []  4   4. Putting on and taking off regular upper body clothing? []  1 []  2 [x]  3 []  4   5. Taking care of personal grooming such as brushing teeth? []  1 []  2 [x]  3 []  4   6. Eating meals? []  1 []  2 [x]  3 []  4   © , Trustees of [facility] [employer], under license to [employer]. All rights reserved     Score:      Interpretation of Tool:  Represents clinically-significant functional categories (i.e. Activities of daily living). Percentage of Impairment CH    0%   CI    1-19% CJ    20-39% CK    40-59% CL    60-79% CM    80-99% CN     100%   AMPA  Score 6-24 24 23 20-22 15-19 10-14 7-9 6         Occupational Therapy Evaluation Charge Determination   History Examination Decision-Making   LOW Complexity : Brief history review  LOW Complexity : 1-3 performance deficits relating to physical, cognitive , or psychosocial skils that result in activity limitations and / or participation restrictions  LOW Complexity : No comorbidities that affect functional and no verbal or physical assistance needed to complete eval tasks       Based on the above components, the patient evaluation is determined to be of the following complexity level: LOW   Pain Ratin/10 LLE    Activity Tolerance:   Fair  Please refer to the flowsheet for vital signs taken during this treatment.     After treatment patient left in no apparent distress:    Sitting in chair, Call bell within reach, and nsg present passing medication    COMMUNICATION/EDUCATION:   The patients plan of care was discussed with: Physical therapist and Registered nurse. Patient/family agree to work toward stated goals and plan of care. This patients plan of care is appropriate for delegation to GILLIAN. PT/OT sessions overlapped for increased safety of pt and clinician. Thank you for this referral.  Juanpablo Grajeda OT  Time Calculation: 48 mins    Problem: Self Care Deficits Care Plan (Adult)  Goal: *Acute Goals and Plan of Care (Insert Text)  Description: Patient stated goal: Get back to walking and going to the bathroom IND. 1. Pt will be IND sup <> sit in prep for EOB ADLs  2. Pt will be IND grooming sitting/standing at sink  3. Pt will be mod I LE dressing sitting EOB/long sit  4. Pt will be IND sit <>  prep for toileting LRAD  5. Pt will be IND toileting/toilet transfer/cloth mgmt LRAD  6.  Pt will be IND following UE HEP in prep for self care tasks      Outcome: Not Met

## 2022-09-20 NOTE — PROGRESS NOTES
= Hospitalist Progress Note    Subjective:   Daily Progress Note: 9/20/2022 9:23 AM    Hospital Course: Patient is a 27-year-old female with a history of diabetes, hypertension, heart failure that presented to the emergency room on 9/19/2022 after sustaining a fall. Patient denied any dizziness, chest pain, loss of consciousness preceding the event. In the ED patient was found with a left hip fracture. Admitted for orthopedic evaluation. Cardiology consulted for cardiac clearance. Echo showed EF 60-65%. Went to the OR on 9/19/2022 for ORIF of the left hip. Tolerated the procedure well. Postop hemoglobin dropped to 7.5. Subjective: Patient is hard of hearing. Denies any chest pain, shortness of breath, abdominal pain, nausea, vomiting. Working with physical therapy.     Current Facility-Administered Medications   Medication Dose Route Frequency    sodium chloride (NS) flush 5-40 mL  5-40 mL IntraVENous Q8H    insulin lispro (HUMALOG) injection   SubCUTAneous AC&HS    glucose chewable tablet 16 g  4 Tablet Oral PRN    glucagon (GLUCAGEN) injection 1 mg  1 mg IntraMUSCular PRN    sodium chloride (NS) flush 5-40 mL  5-40 mL IntraVENous Q8H    naloxone (NARCAN) injection 0.4 mg  0.4 mg IntraVENous PRN    dextrose 10% infusion 0-250 mL  0-250 mL IntraVENous PRN    HYDROmorphone (DILAUDID) syringe 0.5 mg  0.5 mg IntraVENous Q6H PRN    lisinopriL (PRINIVIL, ZESTRIL) tablet 20 mg  20 mg Oral DAILY    alogliptin (NESINA) tablet 12.5 mg  12.5 mg Oral DAILY    nitroglycerin (NITROBID) 2 % ointment 1 Inch  1 Inch Topical Q6H    metoprolol tartrate (LOPRESSOR) tablet 12.5 mg  12.5 mg Oral Q12H    traMADoL (ULTRAM) tablet 50 mg  50 mg Oral Q6H PRN    acetaminophen (TYLENOL) tablet 1,000 mg  1,000 mg Oral Q6H    0.9% sodium chloride infusion  125 mL/hr IntraVENous CONTINUOUS    sodium chloride (NS) flush 5-40 mL  5-40 mL IntraVENous Q8H    sodium chloride (NS) flush 5-40 mL  5-40 mL IntraVENous PRN    naloxone (NARCAN) injection 0.4 mg  0.4 mg IntraVENous PRN    calcium-vitamin D 600 mg-5 mcg (200 unit) per tablet 1 Tablet  1 Tablet Oral TID WITH MEALS    senna-docusate (PERICOLACE) 8.6-50 mg per tablet 1 Tablet  1 Tablet Oral BID    polyethylene glycol (MIRALAX) packet 17 g  17 g Oral DAILY    [START ON 2022] bisacodyL (DULCOLAX) suppository 10 mg  10 mg Rectal DAILY PRN    aspirin delayed-release tablet 81 mg  81 mg Oral BID    DULoxetine (CYMBALTA) capsule 90 mg  90 mg Oral DAILY    insulin glargine (LANTUS) injection 14 Units  14 Units SubCUTAneous QHS    polyethylene glycol (MIRALAX) packet 17 g  17 g Oral DAILY    traMADoL (ULTRAM) tablet 50 mg  50 mg Oral BID    docusate sodium (COLACE) capsule 100 mg  100 mg Oral DAILY    ceFAZolin (ANCEF) 3 g in sterile water (preservative free) 30 mL IV syringe  3 g IntraVENous Q8H        Review of Systems  Constitutional: No fevers, No chills, No sweats, No fatigue, No Weakness  Eyes: No redness  Ears, nose, mouth, throat, and face: No nasal congestion, No sore throat, No voice change  Respiratory: No Shortness of Breath, No cough, No wheezing  Cardiovascular: No chest pain, No palpitations, No extremity edema  Gastrointestinal: No nausea, No vomiting, No diarrhea, No abdominal pain  Genitourinary: No frequency, No dysuria, No hematuria  Integument/breast: No skin lesion(s)   Neurological: No Confusion, No headaches, No dizziness      Objective:     Visit Vitals  BP (!) 132/57 (BP 1 Location: Left upper arm, BP Patient Position: At rest)   Pulse 78   Temp 98.8 °F (37.1 °C)   Resp 16   Ht 5' 1\" (1.549 m)   Wt 132.1 kg (291 lb 3.6 oz)   SpO2 (!) 9%   Breastfeeding No   BMI 55.03 kg/m²    O2 Flow Rate (L/min): 2 l/min O2 Device: Nasal cannula    Temp (24hrs), Av.4 °F (36.9 °C), Min:97.1 °F (36.2 °C), Max:99.4 °F (37.4 °C)      No intake/output data recorded.    1901 -  0700  In: 1825 [I.V.:1825]  Out: 1250 [Urine:1250]    PHYSICAL EXAM:  Constitutional: No acute distress  Skin: Extremities and face reveal no rashes. HEENT: Sclerae anicteric. Extra-occular muscles are intact. No oral ulcers. The neck is supple and no masses. Cardiovascular: RRR  Respiratory:  Clear breath sounds bilaterally with no wheezes, rales, or rhonchi. GI: Abdomen nondistended, soft, and nontender. Normal active bowel sounds. Musculoskeletal: No pitting edema of the lower legs. Able to move all ext  Neurological:  Patient is alert and oriented.  Cranial nerves II-XII grossly intact  Psychiatric: Mood appears appropriate       Data Review    Recent Results (from the past 24 hour(s))   ECHO ADULT COMPLETE    Collection Time: 09/19/22 12:12 PM   Result Value Ref Range    AV Peak Velocity 1.0 m/s    AV Peak Gradient 4 mmHg    Aortic Root 3.1 cm    IVSd 1.1 (A) 0.6 - 0.9 cm    LVIDd 3.5 (A) 3.9 - 5.3 cm    LVIDs 2.3 cm    LVOT Peak Velocity 0.9 m/s    LVOT Peak Gradient 3 mmHg    LVPWd 0.8 0.6 - 0.9 cm    LV E' Lateral Velocity 8 cm/s    LV E' Septal Velocity 5 cm/s    LA Diameter 2.0 cm    MV E Wave Deceleration Time 102.0 ms    MV A Velocity 0.80 m/s    MV E Velocity 0.84 m/s    PV Max Velocity 0.9 m/s    PV Peak Gradient 3 mmHg    Est. RA Pressure 3 mmHg    RVIDd 2.7 cm    TR Max Velocity 2.53 m/s    TR Peak Gradient 26 mmHg    Fractional Shortening 2D 34 28 - 44 %    LVIDd Index 1.58 cm/m2    LVIDs Index 1.04 cm/m2    LV RWT Ratio 0.46     LV Mass 2D 95.9 67 - 162 g    LV Mass 2D Index 43.2 43 - 95 g/m2    MV E/A 1.05     E/E' Ratio (Averaged) 13.65     E/E' Lateral 10.50     E/E' Septal 16.80     LA Size Index 0.90 cm/m2    LA/AO Root Ratio 0.65     Ao Root Index 1.40 cm/m2    AV Velocity Ratio 0.90     RVSP 29 mmHg    EF BP 65 55 - 100 %   GLUCOSE, POC    Collection Time: 09/19/22 12:29 PM   Result Value Ref Range    Glucose (POC) 131 (H) 65 - 100 mg/dL    Performed by Tamika Berg    EKG, 12 LEAD, INITIAL    Collection Time: 09/19/22 12:36 PM   Result Value Ref Range    Ventricular Rate 107 BPM    Atrial Rate 107 BPM    P-R Interval 168 ms    QRS Duration 118 ms    Q-T Interval 358 ms    QTC Calculation (Bezet) 477 ms    Calculated P Axis 66 degrees    Calculated R Axis -59 degrees    Calculated T Axis 14 degrees    Diagnosis       Sinus tachycardia  Incomplete right bundle branch block  Left anterior fascicular block  T wave abnormality, consider anterior ischemia  Abnormal ECG  When compared with ECG of 19-SEP-2022 02:48,  Minimal criteria for Anterior infarct are no longer Present  Confirmed by Mark Ruffin MD, [facility] ([id number]) on 9/19/2022 12:58:05 PM     GLUCOSE, POC    Collection Time: 09/19/22  7:11 PM   Result Value Ref Range    Glucose (POC) 118 (H) 65 - 100 mg/dL    Performed by Ac Calero    GLUCOSE, POC    Collection Time: 09/19/22 10:29 PM   Result Value Ref Range    Glucose (POC) 190 (H) 65 - 100 mg/dL    Performed by [facility]    METABOLIC PANEL, COMPREHENSIVE    Collection Time: 09/20/22  7:40 AM   Result Value Ref Range    Sodium 141 136 - 145 mmol/L    Potassium 4.0 3.5 - 5.1 mmol/L    Chloride 110 (H) 97 - 108 mmol/L    CO2 25 21 - 32 mmol/L    Anion gap 6 5 - 15 mmol/L    Glucose 146 (H) 65 - 100 mg/dL    BUN 25 (H) 6 - 20 mg/dL    Creatinine 1.43 (H) 0.55 - 1.02 mg/dL    BUN/Creatinine ratio 17 12 - 20      GFR est AA 42 (L) >60 ml/min/1.73m2    GFR est non-AA 34 (L) >60 ml/min/1.73m2    Calcium 8.0 (L) 8.5 - 10.1 mg/dL    Bilirubin, total 0.3 0.2 - 1.0 mg/dL    AST (SGOT) 10 (L) 15 - 37 U/L    ALT (SGPT) 8 (L) 12 - 78 U/L    Alk.  phosphatase 44 (L) 45 - 117 U/L    Protein, total 4.6 (L) 6.4 - 8.2 g/dL    Albumin 2.2 (L) 3.5 - 5.0 g/dL    Globulin 2.4 2.0 - 4.0 g/dL    A-G Ratio 0.9 (L) 1.1 - 2.2     CBC WITH AUTOMATED DIFF    Collection Time: 09/20/22  7:40 AM   Result Value Ref Range    WBC 9.2 3.6 - 11.0 K/uL    RBC 2.51 (L) 3.80 - 5.20 M/uL    HGB 7.5 (L) 11.5 - 16.0 g/dL    HCT 23.8 (L) 35.0 - 47.0 %    MCV 94.8 80.0 - 99.0 FL    MCH 29.9 26.0 - 34.0 PG    MCHC 31.5 30.0 - 36.5 g/dL    RDW 14.6 (H) 11.5 - 14.5 %    PLATELET 043 474 - 975 K/uL    MPV 10.3 8.9 - 12.9 FL    NRBC 0.0 0.0  WBC    ABSOLUTE NRBC 0.00 0.00 - 0.01 K/uL    NEUTROPHILS 67 32 - 75 %    LYMPHOCYTES 18 12 - 49 %    MONOCYTES 10 5 - 13 %    EOSINOPHILS 3 0 - 7 %    BASOPHILS 1 0 - 1 %    IMMATURE GRANULOCYTES 1 (H) 0 - 0.5 %    ABS. NEUTROPHILS 6.2 1.8 - 8.0 K/UL    ABS. LYMPHOCYTES 1.7 0.8 - 3.5 K/UL    ABS. MONOCYTES 0.9 0.0 - 1.0 K/UL    ABS. EOSINOPHILS 0.3 0.0 - 0.4 K/UL    ABS. BASOPHILS 0.1 0.0 - 0.1 K/UL    ABS. IMM. GRANS. 0.1 (H) 0.00 - 0.04 K/UL    DF AUTOMATED         Radiology review: Chest xray and xray of left hip    Assessment:   1. Left intertrochanteric fracture closed s/p ORIF #1  2. Hypertension with history of CHF  3. Type 2 diabetes    Plan:   1. X-ray of the hip shows a fracture. Orthopedics consulted. S/p ORIF #1. PT and OT postop per orthopedics  2. Blood pressure stable. Continue to monitor per unit protocol. Chest x-ray shows no signs of CHF. Patient is on lisinopril and verapamil. ECHo unremarkable. .  Cardiology consulted for cardiac clearance  3. Hemoglobin A1c is pending. On insulin sliding scale and alogliptin 12.5 mg daily. 4.  CBC BMP in the a.m. Dispo: 24-48. Barriers include clearance from ortho, post op day #3,stabilization of hemoglobin. Suspect patient will require placement     CODE STATUS full     DVT prophylaxis: Hold at this time due to possible surgery  Ulcer prophylaxis:Tolerating oral diet     Care Plan discussed with: Patient/Family, Nurse, and     Total time spent with patient: 33 minutes.

## 2022-09-21 VITALS
HEART RATE: 78 BPM | TEMPERATURE: 98.8 F | OXYGEN SATURATION: 96 % | HEIGHT: 61 IN | SYSTOLIC BLOOD PRESSURE: 149 MMHG | RESPIRATION RATE: 18 BRPM | WEIGHT: 291.23 LBS | BODY MASS INDEX: 54.98 KG/M2 | DIASTOLIC BLOOD PRESSURE: 62 MMHG

## 2022-09-21 LAB
ANION GAP SERPL CALC-SCNC: 6 MMOL/L (ref 5–15)
BASOPHILS # BLD: 0 K/UL (ref 0–0.1)
BASOPHILS NFR BLD: 0 % (ref 0–1)
BUN SERPL-MCNC: 27 MG/DL (ref 6–20)
BUN/CREAT SERPL: 22 (ref 12–20)
CA-I BLD-MCNC: 8.4 MG/DL (ref 8.5–10.1)
CHLORIDE SERPL-SCNC: 111 MMOL/L (ref 97–108)
CO2 SERPL-SCNC: 25 MMOL/L (ref 21–32)
CREAT SERPL-MCNC: 1.21 MG/DL (ref 0.55–1.02)
DIFFERENTIAL METHOD BLD: ABNORMAL
EOSINOPHIL # BLD: 0.5 K/UL (ref 0–0.4)
EOSINOPHIL NFR BLD: 5 % (ref 0–7)
ERYTHROCYTE [DISTWIDTH] IN BLOOD BY AUTOMATED COUNT: 14.7 % (ref 11.5–14.5)
GLUCOSE BLD STRIP.AUTO-MCNC: 101 MG/DL (ref 65–100)
GLUCOSE BLD STRIP.AUTO-MCNC: 82 MG/DL (ref 65–100)
GLUCOSE SERPL-MCNC: 78 MG/DL (ref 65–100)
HCT VFR BLD AUTO: 23.2 % (ref 35–47)
HGB BLD-MCNC: 7.5 G/DL (ref 11.5–16)
IMM GRANULOCYTES # BLD AUTO: 0.1 K/UL (ref 0–0.04)
IMM GRANULOCYTES NFR BLD AUTO: 1 % (ref 0–0.5)
LYMPHOCYTES # BLD: 1.1 K/UL (ref 0.8–3.5)
LYMPHOCYTES NFR BLD: 10 % (ref 12–49)
MCH RBC QN AUTO: 30.5 PG (ref 26–34)
MCHC RBC AUTO-ENTMCNC: 32.3 G/DL (ref 30–36.5)
MCV RBC AUTO: 94.3 FL (ref 80–99)
MONOCYTES # BLD: 1.1 K/UL (ref 0–1)
MONOCYTES NFR BLD: 10 % (ref 5–13)
NEUTS SEG # BLD: 7.7 K/UL (ref 1.8–8)
NEUTS SEG NFR BLD: 74 % (ref 32–75)
NRBC # BLD: 0 K/UL (ref 0–0.01)
NRBC BLD-RTO: 0 PER 100 WBC
PERFORMED BY, TECHID: ABNORMAL
PERFORMED BY, TECHID: NORMAL
PLATELET # BLD AUTO: 158 K/UL (ref 150–400)
PMV BLD AUTO: 10.9 FL (ref 8.9–12.9)
POTASSIUM SERPL-SCNC: 3.6 MMOL/L (ref 3.5–5.1)
RBC # BLD AUTO: 2.46 M/UL (ref 3.8–5.2)
SODIUM SERPL-SCNC: 142 MMOL/L (ref 136–145)
WBC # BLD AUTO: 10.5 K/UL (ref 3.6–11)

## 2022-09-21 PROCEDURE — 74011250637 HC RX REV CODE- 250/637: Performed by: PHYSICIAN ASSISTANT

## 2022-09-21 PROCEDURE — 74011250637 HC RX REV CODE- 250/637: Performed by: ORTHOPAEDIC SURGERY

## 2022-09-21 PROCEDURE — 80048 BASIC METABOLIC PNL TOTAL CA: CPT

## 2022-09-21 PROCEDURE — 97530 THERAPEUTIC ACTIVITIES: CPT

## 2022-09-21 PROCEDURE — 74011250637 HC RX REV CODE- 250/637: Performed by: INTERNAL MEDICINE

## 2022-09-21 PROCEDURE — 82962 GLUCOSE BLOOD TEST: CPT

## 2022-09-21 PROCEDURE — 74011250637 HC RX REV CODE- 250/637: Performed by: HOSPITALIST

## 2022-09-21 PROCEDURE — 85025 COMPLETE CBC W/AUTO DIFF WBC: CPT

## 2022-09-21 PROCEDURE — 74011000250 HC RX REV CODE- 250: Performed by: HOSPITALIST

## 2022-09-21 PROCEDURE — 36415 COLL VENOUS BLD VENIPUNCTURE: CPT

## 2022-09-21 RX ORDER — METOPROLOL TARTRATE 25 MG/1
25 TABLET, FILM COATED ORAL EVERY 12 HOURS
Qty: 60 TABLET | Refills: 0 | Status: SHIPPED | OUTPATIENT
Start: 2022-09-21 | End: 2022-10-21

## 2022-09-21 RX ORDER — ISOSORBIDE DINITRATE 10 MG/1
10 TABLET ORAL 3 TIMES DAILY
Status: DISCONTINUED | OUTPATIENT
Start: 2022-09-21 | End: 2022-09-21 | Stop reason: HOSPADM

## 2022-09-21 RX ORDER — LEVOFLOXACIN 250 MG/1
500 TABLET ORAL EVERY 24 HOURS
Status: DISCONTINUED | OUTPATIENT
Start: 2022-09-21 | End: 2022-09-21

## 2022-09-21 RX ORDER — LEVOFLOXACIN 250 MG/1
250 TABLET ORAL EVERY 24 HOURS
Status: DISCONTINUED | OUTPATIENT
Start: 2022-09-22 | End: 2022-09-21 | Stop reason: HOSPADM

## 2022-09-21 RX ORDER — ASPIRIN 81 MG/1
81 TABLET ORAL 2 TIMES DAILY
Qty: 60 TABLET | Refills: 0 | Status: SHIPPED | OUTPATIENT
Start: 2022-09-21 | End: 2022-10-21

## 2022-09-21 RX ORDER — LEVOFLOXACIN 500 MG/1
500 TABLET, FILM COATED ORAL EVERY 24 HOURS
Qty: 6 TABLET | Refills: 0 | Status: SHIPPED | OUTPATIENT
Start: 2022-09-21 | End: 2022-09-27

## 2022-09-21 RX ORDER — METOPROLOL TARTRATE 25 MG/1
25 TABLET, FILM COATED ORAL EVERY 12 HOURS
Status: DISCONTINUED | OUTPATIENT
Start: 2022-09-21 | End: 2022-09-21 | Stop reason: HOSPADM

## 2022-09-21 RX ORDER — TRAMADOL HYDROCHLORIDE 50 MG/1
50 TABLET ORAL
Qty: 15 TABLET | Refills: 0 | Status: SHIPPED | OUTPATIENT
Start: 2022-09-21 | End: 2022-09-26

## 2022-09-21 RX ORDER — LEVOFLOXACIN 250 MG/1
500 TABLET ORAL
Status: DISCONTINUED | OUTPATIENT
Start: 2022-09-21 | End: 2022-09-21 | Stop reason: HOSPADM

## 2022-09-21 RX ORDER — POLYETHYLENE GLYCOL 3350 17 G/17G
17 POWDER, FOR SOLUTION ORAL DAILY
Qty: 30 PACKET | Refills: 0 | Status: SHIPPED | OUTPATIENT
Start: 2022-09-22 | End: 2022-10-22

## 2022-09-21 RX ORDER — ISOSORBIDE DINITRATE 10 MG/1
10 TABLET ORAL 3 TIMES DAILY
Qty: 90 TABLET | Refills: 0 | Status: SHIPPED | OUTPATIENT
Start: 2022-09-21 | End: 2022-10-21

## 2022-09-21 RX ORDER — DULOXETIN HYDROCHLORIDE 30 MG/1
90 CAPSULE, DELAYED RELEASE ORAL DAILY
Qty: 90 CAPSULE | Refills: 0 | Status: SHIPPED | OUTPATIENT
Start: 2022-09-22 | End: 2022-10-22

## 2022-09-21 RX ADMIN — SENNOSIDES AND DOCUSATE SODIUM 1 TABLET: 50; 8.6 TABLET ORAL at 09:41

## 2022-09-21 RX ADMIN — Medication 1 TABLET: at 12:27

## 2022-09-21 RX ADMIN — POLYETHYLENE GLYCOL 3350 17 G: 17 POWDER, FOR SOLUTION ORAL at 09:42

## 2022-09-21 RX ADMIN — Medication 1 TABLET: at 09:41

## 2022-09-21 RX ADMIN — LISINOPRIL 20 MG: 20 TABLET ORAL at 09:41

## 2022-09-21 RX ADMIN — ASPIRIN 81 MG: 81 TABLET, COATED ORAL at 09:41

## 2022-09-21 RX ADMIN — METOPROLOL TARTRATE 25 MG: 25 TABLET, FILM COATED ORAL at 12:28

## 2022-09-21 RX ADMIN — DOCUSATE SODIUM 100 MG: 100 CAPSULE, LIQUID FILLED ORAL at 09:41

## 2022-09-21 RX ADMIN — DULOXETINE 90 MG: 30 CAPSULE, DELAYED RELEASE ORAL at 09:41

## 2022-09-21 RX ADMIN — ACETAMINOPHEN 1000 MG: 500 TABLET ORAL at 12:27

## 2022-09-21 RX ADMIN — TRAMADOL HYDROCHLORIDE 50 MG: 50 TABLET, COATED ORAL at 09:41

## 2022-09-21 RX ADMIN — ALOGLIPTIN 12.5 MG: 25 TABLET, FILM COATED ORAL at 09:41

## 2022-09-21 RX ADMIN — SODIUM CHLORIDE, PRESERVATIVE FREE 10 ML: 5 INJECTION INTRAVENOUS at 05:28

## 2022-09-21 NOTE — PROGRESS NOTES
OCCUPATIONAL THERAPY TREATMENT  Patient: Sally Jensen (80 y.o. female)  Date: 9/21/2022  Diagnosis: Hip fracture ([address] Ethan Lux <principal problem not specified>  Procedure(s) (LRB):  Open Reduction Internal Fixation Left Hip (Left) 2 Days Post-Op  Precautions:    Chart, occupational therapy assessment, plan of care, and goals were reviewed. ASSESSMENT  Patient continues with skilled OT services and is progressing towards goals. Upon BLAIR arrival, pt semi supine in bed and agreeable to tx session. Pt completed bed mobility with Mayra at this time. Pt completed seated rest break at EOB for ~2 minutes before completing sit>stand with Mayra from EOB using RW for balance upon standing. Pt completed transfer from EOB>recliner with ModA due to pain. Pt declining further ambulation to bathroom at this time due to pain. Pt completed seated hair brushing with setup A. Pt set up with breakfast tray and required total A for container management. Pt repositioned in chair and left sitting in recliner with call bell within reach and needs met. Will continue to follow pt throughout remainder of stay and progress towards OT goals. Recommending IRF at discharge when medically appropriate. Other factors to consider for discharge: family/social support, DME, time since onset, severity of deficits, decline from functional baseline         PLAN :  Patient continues to benefit from skilled intervention to address the above impairments. Continue treatment per established plan of care. to address goals. Recommendation for discharge: (in order for the patient to meet his/her long term goals)  [address]     This discharge recommendation:  Has been made in collaboration with the attending provider and/or case management    IF patient discharges home will need the following DME: TBD       SUBJECTIVE:   Patient stated I want to just get to the chair.     OBJECTIVE DATA SUMMARY: Cognitive/Behavioral Status:  Neurologic State: Alert  Orientation Level: Oriented X4  Cognition: Follows commands    Functional Mobility and Transfers for ADLs:  Bed Mobility:  Rolling: Minimum assistance  Supine to Sit: Minimum assistance  Scooting: Minimum assistance    Transfers:  Sit to Stand: Minimum assistance     Bed to Chair: Moderate assistance    Balance:  Sitting: Intact; Without support  Standing: Impaired; With support  Standing - Static: Constant support; Fair  Standing - Dynamic : Constant support; Fair    ADL Intervention:  Grooming  Position Performed: Seated in chair  Brushing/Combing Hair: Set-up    Pain:  9/10 L hip/back pain    Activity Tolerance:   Fair and requires rest breaks  Please refer to the flowsheet for vital signs taken during this treatment. After treatment patient left in no apparent distress:   Bed locked and in lowest position, Sitting in chair and Call bell within reach    COMMUNICATION/COLLABORATION:   The patients plan of care was discussed with: Physical therapy assistant and Registered nurse. JOHNNIE Kay  Time Calculation: 32 mins    Problem: Self Care Deficits Care Plan (Adult)  Goal: *Acute Goals and Plan of Care (Insert Text)  Description: Patient stated goal: Get back to walking and going to the bathroom IND. 1. Pt will be IND sup <> sit in prep for EOB ADLs  2. Pt will be IND grooming sitting/standing at sink  3. Pt will be mod I LE dressing sitting EOB/long sit  4. Pt will be IND sit <>  prep for toileting LRAD  5. Pt will be IND toileting/toilet transfer/cloth mgmt LRAD  6.  Pt will be IND following UE HEP in prep for self care tasks      Outcome: Progressing Towards Goal

## 2022-09-21 NOTE — DISCHARGE SUMMARY
Hospitalist Discharge Summary     Patient ID:    Kenyetta Parmar  [id number]  49 y.o.  2/17/1932    Admit date: 9/19/2022    Discharge date : 9/21/2022    Chronic Diagnoses:    Problem List as of 9/21/2022 Date Reviewed: 9/19/2022            Codes Class Noted - Resolved    Closed intertrochanteric fracture of left femur ([facility] ICD-10-CM: M46.213W  ICD-9-CM: 820.21  9/19/2022 - Present        Hip fracture ([facility] ICD-10-CM: S72.009A  ICD-9-CM: 820.8  9/19/2022 - Present        Compression fracture of L2 lumbar vertebra ([facility] ICD-10-CM: U99.769X  ICD-9-CM: 805.4  5/2/2022 - Present        Compression fracture of L2 ([facility] ICD-10-CM: Q03.976U  ICD-9-CM: 805.4  5/1/2022 - Present           Final Diagnoses:   1. Left intertrochanteric fracture closed s/p ORIF #2  2. Hypertension with history of CHF  3. Type 2 diabetes  4. UTI    Reason for Hospitalization: [facility] Course: Patient is a 70-year-old female with a history of diabetes, hypertension, heart failure that presented to the emergency room on 9/19/2022 after sustaining a fall. Patient denied any dizziness, chest pain, loss of consciousness preceding the event. In the ED patient was found with a left hip fracture. Admitted for orthopedic evaluation. Cardiology consulted for cardiac clearance. Echo showed EF 60-65%. Went to the OR on 9/19/2022 for ORIF of the left hip. Tolerated the procedure well. Postop hemoglobin dropped to 7.5. The following day patient's hemoglobin is still 7.5. No signs of active bleeding. Hemodynamically stable. No need for transfusion. She is postop day #2. She is stable for discharge to her. Hemoglobin A1c 6.6. Urine culture positive for gram-negative rods. Afebrile and WBC WNL. Will start oral levaquin 500mg daily for 7 days. She will be going to [facility] and can follow culture and sensitivity.      Discharge Medications:   Current Discharge Medication List        START taking these medications    Details   polyethylene glycol (MIRALAX) 17 gram packet Take 1 Packet by mouth daily for 30 days. Qty: 30 Packet, Refills: 0  Start date: 9/22/2022, End date: 10/22/2022      isosorbide dinitrate (ISORDIL) 10 mg tablet Take 1 Tablet by mouth three (3) times daily for 30 days. Qty: 90 Tablet, Refills: 0  Start date: 9/21/2022, End date: 10/21/2022      metoprolol tartrate (LOPRESSOR) 25 mg tablet Take 1 Tablet by mouth every twelve (12) hours for 30 days. Qty: 60 Tablet, Refills: 0  Start date: 9/21/2022, End date: 10/21/2022      traMADoL (ULTRAM) 50 mg tablet Take 1 Tablet by mouth every six (6) hours as needed for Pain for up to 5 days. Max Daily Amount: 200 mg. Indications: pain  Qty: 15 Tablet, Refills: 0  Start date: 9/21/2022, End date: 9/26/2022    Associated Diagnoses: Closed fracture of left hip, initial encounter [facility]      levoFLOXacin (LEVAQUIN) 500 mg tablet Take 1 Tablet by mouth every twenty-four (24) hours for 6 days. Qty: 6 Tablet, Refills: 0  Start date: 9/21/2022, End date: 9/27/2022           CONTINUE these medications which have CHANGED    Details   aspirin delayed-release 81 mg tablet Take 1 Tablet by mouth two (2) times a day for 30 days. Qty: 60 Tablet, Refills: 0  Start date: 9/21/2022, End date: 10/21/2022      DULoxetine (CYMBALTA) 30 mg capsule Take 3 Capsules by mouth daily for 30 days. Qty: 90 Capsule, Refills: 0  Start date: 9/22/2022, End date: 10/22/2022           CONTINUE these medications which have NOT CHANGED    Details   fexofenadine (ALLEGRA) 180 mg tablet       lisinopriL (PRINIVIL, ZESTRIL) 20 mg tablet       Januvia 50 mg tablet       allopurinoL (ZYLOPRIM) 300 mg tablet Take 300 mg by mouth daily. melatonin 3 mg tablet Take 3 mg by mouth nightly. furosemide (LASIX) 40 mg tablet Take 40 mg by mouth daily. cholecalciferol, vitamin D3, 50 mcg (2,000 unit) tab Take 1 Tablet by mouth daily.       ferrous sulfate 325 mg (65 mg iron) tablet Take 325 mg by mouth. insulin glargine (LANTUS) 100 unit/mL injection by SubCUTAneous route At bedtime. potassium chloride (KAON 10%) 20 mEq/15 mL solution Take  by mouth daily. STOP taking these medications       Lantus Solostar U-100 Insulin 100 unit/mL (3 mL) inpn Comments:   Reason for Stopping:         HYDROcodone-acetaminophen (NORCO) 5-325 mg per tablet Comments:   Reason for Stopping:         verapamil ER (CALAN-SR) 240 mg CR tablet Comments:   Reason for Stopping:         hydrALAZINE (APRESOLINE) 50 mg tablet Comments:   Reason for Stopping:         hydrALAZINE (APRESOLINE) 50 mg tablet Comments:   Reason for Stopping:         acetaminophen (TYLENOL) 325 mg tablet Comments:   Reason for Stopping:         verapamil ER (CALAN-SR) 240 mg CR tablet Comments:   Reason for Stopping: Follow up Care:    1. Ravindra Santos MD in 1-2 weeks. Follow-up Information       Follow up With Specialties Details Why Contact Info    Ravindra Santos MD Family Medicine Follow up in 2 week(s)  [facility]      Blane Shepherd MD Orthopedic Surgery Follow up in 2 week(s)  [id number]  [phone]                Patient Follow Up Instructions: Activity: Activity as tolerated  Diet:  Cardiac Diet  Wound care: None Needed    Condition at Discharge:  Stable  __________________________________________________________________    Disposition  IP Rehab  ____________________________________________________________________    Code Status: Full Code  ___________________________________________________________________    Discharge Exam:  Patient seen and examined by me on discharge day. Pertinent Findings:  Gen:    Not in distress  Chest: Clear lungs  CVS:   Regular rhythm.   No edema  Abd:  Soft, not distended, not tender  Neuro:  Alert    CONSULTATIONS: Cardiology and Orthopedic Surgery    Significant Diagnostic Studies:   9/19/2022: BUN 22 mg/dL (H; Ref range: 6 - 20 mg/dL); BUN 23 mg/dL (H; Ref range: 6 - 20 mg/dL); Calcium 8.8 mg/dL (Ref range: 8.5 - 10.1 mg/dL); Calcium 8.8 mg/dL (Ref range: 8.5 - 10.1 mg/dL); CO2 27 mmol/L (Ref range: 21 - 32 mmol/L); CO2 25 mmol/L (Ref range: 21 - 32 mmol/L); Creatinine 1.64 mg/dL (H; Ref range: 0.55 - 1.02 mg/dL); Creatinine 1.64 mg/dL (H; Ref range: 0.55 - 1.02 mg/dL); Glucose 202 mg/dL (H; Ref range: 65 - 100 mg/dL); Glucose 204 mg/dL (H; Ref range: 65 - 100 mg/dL); HCT 35.5 % (Ref range: 35.0 - 47.0 %); HGB 11.5 g/dL (Ref range: 11.5 - 16.0 g/dL); Potassium Hemolyzed, recollect requested mmol/L (Ref range: 3.5 - 5.1 mmol/L); Potassium 4.4 mmol/L (Ref range: 3.5 - 5.1 mmol/L); Potassium Hemolyzed, recollect requested mmol/L (Ref range: 3.5 - 5.1 mmol/L); Sodium 141 mmol/L (Ref range: 136 - 145 mmol/L); Sodium 140 mmol/L (Ref range: 136 - 145 mmol/L)  9/20/2022: BUN 25 mg/dL (H; Ref range: 6 - 20 mg/dL); Calcium 8.0 mg/dL (L; Ref range: 8.5 - 10.1 mg/dL); CO2 25 mmol/L (Ref range: 21 - 32 mmol/L); Creatinine 1.43 mg/dL (H; Ref range: 0.55 - 1.02 mg/dL); Glucose 146 mg/dL (H; Ref range: 65 - 100 mg/dL); HCT 23.8 % (L; Ref range: 35.0 - 47.0 %); HGB 7.5 g/dL (L; Ref range: 11.5 - 16.0 g/dL); Potassium 4.0 mmol/L (Ref range: 3.5 - 5.1 mmol/L);  Sodium 141 mmol/L (Ref range: 136 - 145 mmol/L)  Recent Labs     09/21/22  0727 09/20/22  0740   WBC 10.5 9.2   HGB 7.5* 7.5*   HCT 23.2* 23.8*    153     Recent Labs     09/21/22  0727 09/20/22  0740 09/19/22  0441 09/19/22  0304    141  --  140  141   K 3.6 4.0 Hemolyzed, recollect requested 4.4  Hemolyzed, recollect requested   * 110*  --  107  107   CO2 25 25  --  25  27   BUN 27* 25*  --  23*  22*   CREA 1.21* 1.43*  --  1.64*  1.64*   GLU 78 146*  --  204*  202*   CA 8.4* 8.0*  --  8.8  8.8   MG  --   --  Hemolyzed, recollect requested Hemolyzed, recollect requested   PHOS  --   --   --  3.9     Recent Labs     09/20/22  0740 09/19/22  0304   ALT 8* 13   AP 44* 67   TBILI 0.3 0.3   TP 4.6* 6.0*   ALB 2.2* 2.8*  2.8*   GLOB 2.4 3.2     Recent Labs     09/19/22  0304   INR 1.1   PTP 13.9      No results for input(s): FE, TIBC, PSAT, FERR in the last 72 hours. No results for input(s): PH, PCO2, PO2 in the last 72 hours. No results for input(s): CPK, CKMB in the last 72 hours.     No lab exists for component: TROPONINI  Lab Results   Component Value Date/Time    Glucose (POC) 101 (H) 09/21/2022 11:28 AM    Glucose (POC) 82 09/21/2022 07:18 AM    Glucose (POC) 129 (H) 09/20/2022 09:44 PM    Glucose (POC) 129 (H) 09/20/2022 04:30 PM    Glucose (POC) 134 (H) 09/20/2022 02:26 PM         Total Time: >30 min     Signed:  Ramiro Villegas PA-C  9/21/2022  11:57 AM

## 2022-09-21 NOTE — PROGRESS NOTES
Patient cleared to go to [facility] today, and they can accept at 2pm today. Nurse can call report to [phone] and room assignment will be given out during report. CM met with patient and family to notify of acceptance today and of DC. CM informed patient that Medicare only covers stretcher transport and they will not cover the cost if the patient can sit in a chair and has no qualifying bed bound diagnosis. CM stated that family can provide transportation for patient or it will need to be paid for out-of-pocket. Family asking for quote for wheelchair transport. CM called [facility] @ [phone] and spoke with Bora Herrera. Quote given for $138.68 and they will provide O2. CM notified family. Discharge plan of care/case management plan validated with provider discharge order. ---------------------------    1300- CM called [facility] and spoke with Bora Herrera and confirmed that family has paid.  Transport set for 2pm.

## 2022-09-21 NOTE — PROGRESS NOTES
Educated to use call bell for assistance to prevent falls and for pain management.   Verbalized understanding

## 2022-09-21 NOTE — PROGRESS NOTES
Physician Progress Note      PATIENT:               Leda Finch  CSN #:                  [id number]  :                       1932  ADMIT DATE:       2022 2:26 AM  DISCH DATE:        2022 2:36 PM  RESPONDING  PROVIDER #:        Raymundo FERREIRA PA-C        QUERY TEXT:    Stage of Chronic Kidney Disease: Please provide further specificity, if known. Clinical indicators include:  Options provided:  -- Chronic kidney disease stage 1  -- Chronic kidney disease stage 2  -- Chronic kidney disease stage 3  -- Chronic kidney disease stage 3a  -- Chronic kidney disease stage 3b  -- Chronic kidney disease stage 4  -- Chronic kidney disease stage 5  -- Chronic kidney disease stage 5, requiring dialysis  -- End stage renal disease  -- Other - I will add my own diagnosis  -- Disagree - Not applicable / Not valid  -- Disagree - Clinically Unable to determine / Unknown        PROVIDER RESPONSE TEXT:    The patient has chronic kidney disease stage 2.       Electronically signed by:  Lori Sierra PA-C 2022 2:55 PM

## 2022-09-21 NOTE — PROGRESS NOTES
Problem: Falls - Risk of  Goal: *Absence of Falls  Description: Document [place] Fall Risk and appropriate interventions in the flowsheet. Outcome: Progressing Towards Goal  Note: Fall Risk Interventions:  Mobility Interventions: Bed/chair exit alarm         Medication Interventions: Bed/chair exit alarm    Elimination Interventions: Bed/chair exit alarm, Call light in reach    History of Falls Interventions: Bed/chair exit alarm         Problem: Patient Education: Go to Patient Education Activity  Goal: Patient/Family Education  Outcome: Progressing Towards Goal     Problem: Pressure Injury - Risk of  Goal: *Prevention of pressure injury  Description: Document Chris Scale and appropriate interventions in the flowsheet. Outcome: Progressing Towards Goal  Note: Pressure Injury Interventions:  Sensory Interventions: Minimize linen layers    Moisture Interventions: Absorbent underpads    Activity Interventions: PT/OT evaluation    Mobility Interventions: PT/OT evaluation    Nutrition Interventions: Document food/fluid/supplement intake    Friction and Shear Interventions: HOB 30 degrees or less                Problem: Patient Education: Go to Patient Education Activity  Goal: Patient/Family Education  Outcome: Progressing Towards Goal     Problem: Patient Education: Go to Patient Education Activity  Goal: Patient/Family Education  Outcome: Progressing Towards Goal     Problem: Patient Education: Go to Patient Education Activity  Goal: Patient/Family Education  Outcome: Progressing Towards Goal     Problem: Patient Education: Go to Patient Education Activity  Goal: Patient/Family Education  Outcome: Progressing Towards Goal     Problem: Diabetes Self-Management  Goal: *Disease process and treatment process  Description: Define diabetes and identify own type of diabetes; list 3 options for treating diabetes.   Outcome: Progressing Towards Goal  Goal: *Incorporating nutritional management into lifestyle  Description: Describe effect of type, amount and timing of food on blood glucose; list 3 methods for planning meals. Outcome: Progressing Towards Goal  Goal: *Incorporating physical activity into lifestyle  Description: State effect of exercise on blood glucose levels. Outcome: Progressing Towards Goal  Goal: *Developing strategies to promote health/change behavior  Description: Define the ABC's of diabetes; identify appropriate screenings, schedule and personal plan for screenings. Outcome: Progressing Towards Goal  Goal: *Using medications safely  Description: State effect of diabetes medications on diabetes; name diabetes medication taking, action and side effects. Outcome: Progressing Towards Goal  Goal: *Monitoring blood glucose, interpreting and using results  Description: Identify recommended blood glucose targets  and personal targets. Outcome: Progressing Towards Goal  Goal: *Prevention, detection, treatment of acute complications  Description: List symptoms of hyper- and hypoglycemia; describe how to treat low blood sugar and actions for lowering  high blood glucose level. Outcome: Progressing Towards Goal  Goal: *Prevention, detection and treatment of chronic complications  Description: Define the natural course of diabetes and describe the relationship of blood glucose levels to long term complications of diabetes.   Outcome: Progressing Towards Goal  Goal: *Developing strategies to address psychosocial issues  Description: Describe feelings about living with diabetes; identify support needed and support network  Outcome: Progressing Towards Goal  Goal: *Insulin pump training  Outcome: Progressing Towards Goal  Goal: *Sick day guidelines  Outcome: Progressing Towards Goal  Goal: *Patient Specific Goal (EDIT GOAL, INSERT TEXT)  Outcome: Progressing Towards Goal     Problem: Patient Education: Go to Patient Education Activity  Goal: Patient/Family Education  Outcome: Progressing Towards Goal

## 2022-09-21 NOTE — PROGRESS NOTES
= Hospitalist Progress Note    Subjective:   Daily Progress Note: 9/21/2022 9:23 AM    Hospital Course: Patient is a 80-year-old female with a history of diabetes, hypertension, heart failure that presented to the emergency room on 9/19/2022 after sustaining a fall. Patient denied any dizziness, chest pain, loss of consciousness preceding the event. In the ED patient was found with a left hip fracture. Admitted for orthopedic evaluation. Cardiology consulted for cardiac clearance. Echo showed EF 60-65%. Went to the OR on 9/19/2022 for ORIF of the left hip. Tolerated the procedure well. Postop hemoglobin dropped to 7.5. Subjective: Patient has some left hip pain.  No chest pain or SOB    Current Facility-Administered Medications   Medication Dose Route Frequency    sodium chloride (NS) flush 5-40 mL  5-40 mL IntraVENous Q8H    insulin lispro (HUMALOG) injection   SubCUTAneous AC&HS    glucose chewable tablet 16 g  4 Tablet Oral PRN    glucagon (GLUCAGEN) injection 1 mg  1 mg IntraMUSCular PRN    sodium chloride (NS) flush 5-40 mL  5-40 mL IntraVENous Q8H    naloxone (NARCAN) injection 0.4 mg  0.4 mg IntraVENous PRN    dextrose 10% infusion 0-250 mL  0-250 mL IntraVENous PRN    HYDROmorphone (DILAUDID) syringe 0.5 mg  0.5 mg IntraVENous Q6H PRN    lisinopriL (PRINIVIL, ZESTRIL) tablet 20 mg  20 mg Oral DAILY    alogliptin (NESINA) tablet 12.5 mg  12.5 mg Oral DAILY    nitroglycerin (NITROBID) 2 % ointment 1 Inch  1 Inch Topical Q6H    metoprolol tartrate (LOPRESSOR) tablet 12.5 mg  12.5 mg Oral Q12H    traMADoL (ULTRAM) tablet 50 mg  50 mg Oral Q6H PRN    acetaminophen (TYLENOL) tablet 1,000 mg  1,000 mg Oral Q6H    sodium chloride (NS) flush 5-40 mL  5-40 mL IntraVENous Q8H    sodium chloride (NS) flush 5-40 mL  5-40 mL IntraVENous PRN    naloxone (NARCAN) injection 0.4 mg  0.4 mg IntraVENous PRN    calcium-vitamin D 600 mg-5 mcg (200 unit) per tablet 1 Tablet  1 Tablet Oral TID WITH MEALS    senna-docusate (PERICOLACE) 8.6-50 mg per tablet 1 Tablet  1 Tablet Oral BID    polyethylene glycol (MIRALAX) packet 17 g  17 g Oral DAILY    bisacodyL (DULCOLAX) suppository 10 mg  10 mg Rectal DAILY PRN    aspirin delayed-release tablet 81 mg  81 mg Oral BID    DULoxetine (CYMBALTA) capsule 90 mg  90 mg Oral DAILY    insulin glargine (LANTUS) injection 14 Units  14 Units SubCUTAneous QHS    polyethylene glycol (MIRALAX) packet 17 g  17 g Oral DAILY    traMADoL (ULTRAM) tablet 50 mg  50 mg Oral BID    docusate sodium (COLACE) capsule 100 mg  100 mg Oral DAILY        Review of Systems  Constitutional: No fevers, No chills, No sweats, ++ fatigue, ++ Weakness  Eyes: No redness  Ears, nose, mouth, throat, and face: No nasal congestion, No sore throat, No voice change  Respiratory: No Shortness of Breath, No cough, No wheezing  Cardiovascular: No chest pain, No palpitations, No extremity edema  Gastrointestinal: No nausea, No vomiting, No diarrhea, No abdominal pain  Genitourinary: No frequency, No dysuria, No hematuria  Integument/breast: No skin lesion(s)   Neurological: No Confusion, No headaches, No dizziness      Objective:     Visit Vitals  BP (!) 149/62 (BP 1 Location: Left upper arm, BP Patient Position: At rest;Supine)   Pulse 78   Temp 98.8 °F (37.1 °C)   Resp 18   Ht 5' 1\" (1.549 m)   Wt 132.1 kg (291 lb 3.6 oz)   SpO2 95%   Breastfeeding No   BMI 55.03 kg/m²    O2 Flow Rate (L/min): 1 l/min O2 Device: Nasal cannula    Temp (24hrs), Av.2 °F (36.8 °C), Min:97.8 °F (36.6 °C), Max:98.8 °F (37.1 °C)      No intake/output data recorded.  1901 -  0700  In: 0765 [P.O.:540; I.V.:1250]  Out: 1150 [Urine:1150]    PHYSICAL EXAM:  Constitutional: No acute distress  Skin: Extremities and face reveal no rashes. HEENT: Sclerae anicteric. Extra-occular muscles are intact. Cardiovascular: RRR  Respiratory:  Clear breath sounds bilaterally with no wheezes, rales, or rhonchi. GI: Abdomen nondistended, soft, and nontender. Normal active bowel sounds. Musculoskeletal: No pitting edema of the lower legs. Able to move all ext  Neurological:  Patient is alert and oriented. Cranial nerves II-XII grossly intact  Psychiatric: Mood appears appropriate       Data Review    Recent Results (from the past 24 hour(s))   GLUCOSE, POC    Collection Time: 09/20/22  2:26 PM   Result Value Ref Range    Glucose (POC) 134 (H) 65 - 100 mg/dL    Performed by Tariq Dailey, POC    Collection Time: 09/20/22  4:30 PM   Result Value Ref Range    Glucose (POC) 129 (H) 65 - 100 mg/dL    Performed by Le Hernandez, POC    Collection Time: 09/20/22  9:44 PM   Result Value Ref Range    Glucose (POC) 129 (H) 65 - 100 mg/dL    Performed by Solange Prado, POC    Collection Time: 09/21/22  7:18 AM   Result Value Ref Range    Glucose (POC) 82 65 - 100 mg/dL    Performed by Joceline STONER [place])        Radiology review: Chest xray and xray of left hip    Assessment:   1. Left intertrochanteric fracture closed s/p ORIF #2  2. Hypertension with history of CHF  3. Type 2 diabetes    Plan:   1. X-ray of the hip shows a fracture. Orthopedics consulted. S/p ORIF #2. PT and OT postop per orthopedics. They recommend IRF  2. Blood pressure stable. Continue to monitor per unit protocol. Chest x-ray shows no signs of CHF. Continue with lisinopril, metoprolol. ECHo unremarkable. .  Cardiology consulted for cardiac clearance  3. Hemoglobin A1c 6.6. On insulin sliding scale and alogliptin 12.5 mg daily. 4.  CBC BMP in the a.m. Dispo: today-24 hours. Barriers include clearance from ortho, post op day #2,stabilization of hemoglobin. Patient will be placement to her     CODE STATUS full     DVT prophylaxis: ASA BID  Ulcer prophylaxis:Tolerating oral diet     Care Plan discussed with: Patient/Family, Nurse, and     Total time spent with patient: 32 minutes.

## 2022-09-21 NOTE — PROGRESS NOTES
Pharmacy Note     Levofloxacin 500 mg PO q24h ordered for treatment of UTI. Per [facility] Lactam Policy, Levofloxacin will be changed to 500 mg PO once then 250 mg PO q24h. Estimated Creatinine Clearance: Estimated Creatinine Clearance: 39.8 mL/min (A) (based on SCr of 1.21 mg/dL (H)). Dialysis Status, KIRK, CKD:     BMI:  Body mass index is 55.03 kg/m². Recent Labs     22  0727 22  0740 22  0304   WBC 10.5 9.2 14.0*     Temp (24hrs), Av.2 °F (36.8 °C), Min:97.8 °F (36.6 °C), Max:98.8 °F (37.1 °C)      Rationale for Adjustment:  renal adjustment. Pharmacy will continue to monitor and adjust dose as necessary. Please call with any questions.     Thank you,  Tasia Palma, PHARMD

## 2022-09-21 NOTE — PROGRESS NOTES
Progress Note      9/21/2022 15:74 AM  NAME: Philip Schwab   MRN:  [id number]   Admit Diagnosis: [facility] [S72.009A]          Assessment/Plan:     HFpEF, patient well compensated from the standpoint of congestive heart failure post hip replacement surgery. Hypertension, blood pressure mildly elevated. We will go up on patient's metoprolol dose. Patient will continue current regimen of lisinopril and nitrates. Anemia, hemoglobin stable at 7.5. []       High complexity decision making was performed in this patient at high risk for decompensation with multiple organ involvement. Subjective:     Philip Schwab denies chest pain, dyspnea. Discussed with RN events overnight. Review of Systems:    Review of Systems   Constitutional:  Negative for chills, fever and weight loss. HENT:  Negative for congestion and sore throat. Eyes:  Negative for blurred vision and double vision. Respiratory:  Negative for cough, shortness of breath and wheezing. Cardiovascular:  Negative for claudication and PND. Gastrointestinal:  Negative for abdominal pain, blood in stool, nausea and vomiting. Genitourinary:  Negative for dysuria and hematuria. Musculoskeletal:  Positive for joint pain. Negative for myalgias. Complaints of left hip pain with generalized tiredness/weakness   Skin:  Negative for itching and rash. Neurological:  Negative for dizziness, sensory change, focal weakness, loss of consciousness and headaches. Endo/Heme/Allergies:  Negative for polydipsia. Does not bruise/bleed easily. Psychiatric/Behavioral:  Negative for depression and hallucinations. Objective:      Physical Exam:    Last 24hrs VS reviewed since prior progress note.  Most recent are:    Visit Vitals  BP (!) 149/62 (BP 1 Location: Left upper arm, BP Patient Position: At rest;Supine)   Pulse 78   Temp 98.8 °F (37.1 °C)   Resp 18   Ht 5' 1\" (1.549 m)   Wt 132.1 kg (291 lb 3.6 oz)   SpO2 95% Breastfeeding No   BMI 55.03 kg/m²       Intake/Output Summary (Last 24 hours) at 9/21/2022 1046  Last data filed at 9/21/2022 0945  Gross per 24 hour   Intake 840 ml   Output 650 ml   Net 190 ml        Physical Exam:  Constitutional: Well developed well-nourished patient who appeared in no acute distress  HEENT: Normocephalic and atraumatic. Extraocular movement intact. Pupil reactive to light bilaterally  Neck: Supple without thyromegaly  Lungs: Scattered rhonchi diffusely  Heart:  Regular rhythm, normal S1-S2.  Jugular venous distention absent. Carotid bruits absent. PMI in fifth intercostal space on left midclavicular line. Extremities  Trace edema bilaterally  Abdomen: Soft nontender nondistended hypoactive bowel sounds  Skin: Dry and warm    []         Post-cath site without hematoma, bruit, tenderness, or thrill. Distal pulses intact. PMH/SH reviewed - no change compared to H&P    Data Review    Telemetry: normal sinus rhythm     EKG:   []  No new EKG for review    XR FLUOROSCOPY UNDER 60 MINUTES   Final Result   Intraoperative views as above. See operative report for details. XR CHEST PORT   Final Result   No evidence of acute cardiopulmonary process. XR HIP LT W OR WO PELV 2-3 VWS   Final Result   Intertrochanteric left hip fracture.            Recent Results (from the past 24 hour(s))   GLUCOSE, POC    Collection Time: 09/20/22  2:26 PM   Result Value Ref Range    Glucose (POC) 134 (H) 65 - 100 mg/dL    Performed by Chuy Talley, POC    Collection Time: 09/20/22  4:30 PM   Result Value Ref Range    Glucose (POC) 129 (H) 65 - 100 mg/dL    Performed by Jeanna Chung, POC    Collection Time: 09/20/22  9:44 PM   Result Value Ref Range    Glucose (POC) 129 (H) 65 - 100 mg/dL    Performed by Reggie Early, POC    Collection Time: 09/21/22  7:18 AM   Result Value Ref Range    Glucose (POC) 82 65 - 100 mg/dL    Performed by Johan [facility]ma[facility]) METABOLIC PANEL, BASIC    Collection Time: 09/21/22  7:27 AM   Result Value Ref Range    Sodium 142 136 - 145 mmol/L    Potassium 3.6 3.5 - 5.1 mmol/L    Chloride 111 (H) 97 - 108 mmol/L    CO2 25 21 - 32 mmol/L    Anion gap 6 5 - 15 mmol/L    Glucose 78 65 - 100 mg/dL    BUN 27 (H) 6 - 20 mg/dL    Creatinine 1.21 (H) 0.55 - 1.02 mg/dL    BUN/Creatinine ratio 22 (H) 12 - 20      GFR est AA 51 (L) >60 ml/min/1.73m2    GFR est non-AA 42 (L) >60 ml/min/1.73m2    Calcium 8.4 (L) 8.5 - 10.1 mg/dL   CBC WITH AUTOMATED DIFF    Collection Time: 09/21/22  7:27 AM   Result Value Ref Range    WBC 10.5 3.6 - 11.0 K/uL    RBC 2.46 (L) 3.80 - 5.20 M/uL    HGB 7.5 (L) 11.5 - 16.0 g/dL    HCT 23.2 (L) 35.0 - 47.0 %    MCV 94.3 80.0 - 99.0 FL    MCH 30.5 26.0 - 34.0 PG    MCHC 32.3 30.0 - 36.5 g/dL    RDW 14.7 (H) 11.5 - 14.5 %    PLATELET 627 326 - 548 K/uL    MPV 10.9 8.9 - 12.9 FL    NRBC 0.0 0.0  WBC    ABSOLUTE NRBC 0.00 0.00 - 0.01 K/uL    NEUTROPHILS 74 32 - 75 %    LYMPHOCYTES 10 (L) 12 - 49 %    MONOCYTES 10 5 - 13 %    EOSINOPHILS 5 0 - 7 %    BASOPHILS 0 0 - 1 %    IMMATURE GRANULOCYTES 1 (H) 0 - 0.5 %    ABS. NEUTROPHILS 7.7 1.8 - 8.0 K/UL    ABS. LYMPHOCYTES 1.1 0.8 - 3.5 K/UL    ABS. MONOCYTES 1.1 (H) 0.0 - 1.0 K/UL    ABS. EOSINOPHILS 0.5 (H) 0.0 - 0.4 K/UL    ABS. BASOPHILS 0.0 0.0 - 0.1 K/UL    ABS. IMM. GRANS. 0.1 (H) 0.00 - 0.04 K/UL    DF AUTOMATED            Echo:   09/19/22    ECHO ADULT COMPLETE 09/19/2022 9/19/2022    Interpretation Summary    Left Ventricle: Normal left ventricular systolic function with a visually estimated EF of 60 - 65%. Left ventricle size is normal. Normal wall thickness. Normal wall motion. Aortic Valve: Valve structure is normal. Mild sclerosis of the aortic valve cusp. Mitral Valve: Valve structure is normal. Mild annular calcification of the mitral valve.     Signed by: Jose Luis Diaz MD on 9/19/2022  1:08 PM      Patient's  EKG, laboratory data and echocardiogram were individually reviewed by me. Lab Data:    Recent Labs     09/21/22  0727 09/20/22  0740   WBC 10.5 9.2   HGB 7.5* 7.5*   HCT 23.2* 23.8*    153     Recent Labs     09/19/22  0304   INR 1.1   PTP 13.9      Recent Labs     09/21/22  0727 09/20/22  0740 09/19/22  0441 09/19/22  0304    141  --  140  141   K 3.6 4.0 Hemolyzed, recollect requested 4.4  Hemolyzed, recollect requested   * 110*  --  107  107   CO2 25 25  --  25  27   BUN 27* 25*  --  23*  22*   CREA 1.21* 1.43*  --  1.64*  1.64*   GLU 78 146*  --  204*  202*   CA 8.4* 8.0*  --  8.8  8.8   MG  --   --  Hemolyzed, recollect requested Hemolyzed, recollect requested     No results for input(s): CPK, CKNDX, TROIQ in the last 72 hours. No lab exists for component: CPKMB  No results found for: CHOL, CHOLX, CHLST, CHOLV, HDL, HDLP, LDL, LDLC, DLDLP, TGLX, TRIGL, TRIGP, CHHD, CHHDX    Recent Labs     09/20/22  0740 09/19/22  0304   AP 44* 67   TP 4.6* 6.0*   ALB 2.2* 2.8*  2.8*   GLOB 2.4 3.2     No results for input(s): PH, PCO2, PO2 in the last 72 hours.     Medications Personally Reviewed:    Current Facility-Administered Medications   Medication Dose Route Frequency    sodium chloride (NS) flush 5-40 mL  5-40 mL IntraVENous Q8H    insulin lispro (HUMALOG) injection   SubCUTAneous AC&HS    glucose chewable tablet 16 g  4 Tablet Oral PRN    glucagon (GLUCAGEN) injection 1 mg  1 mg IntraMUSCular PRN    sodium chloride (NS) flush 5-40 mL  5-40 mL IntraVENous Q8H    naloxone (NARCAN) injection 0.4 mg  0.4 mg IntraVENous PRN    dextrose 10% infusion 0-250 mL  0-250 mL IntraVENous PRN    HYDROmorphone (DILAUDID) syringe 0.5 mg  0.5 mg IntraVENous Q6H PRN    lisinopriL (PRINIVIL, ZESTRIL) tablet 20 mg  20 mg Oral DAILY    alogliptin (NESINA) tablet 12.5 mg  12.5 mg Oral DAILY    nitroglycerin (NITROBID) 2 % ointment 1 Inch  1 Inch Topical Q6H    metoprolol tartrate (LOPRESSOR) tablet 12.5 mg  12.5 mg Oral Q12H    traMADoL (ULTRAM) tablet 50 mg  50 mg Oral Q6H PRN    acetaminophen (TYLENOL) tablet 1,000 mg  1,000 mg Oral Q6H    sodium chloride (NS) flush 5-40 mL  5-40 mL IntraVENous Q8H    sodium chloride (NS) flush 5-40 mL  5-40 mL IntraVENous PRN    naloxone (NARCAN) injection 0.4 mg  0.4 mg IntraVENous PRN    calcium-vitamin D 600 mg-5 mcg (200 unit) per tablet 1 Tablet  1 Tablet Oral TID WITH MEALS    senna-docusate (PERICOLACE) 8.6-50 mg per tablet 1 Tablet  1 Tablet Oral BID    bisacodyL (DULCOLAX) suppository 10 mg  10 mg Rectal DAILY PRN    aspirin delayed-release tablet 81 mg  81 mg Oral BID    DULoxetine (CYMBALTA) capsule 90 mg  90 mg Oral DAILY    insulin glargine (LANTUS) injection 14 Units  14 Units SubCUTAneous QHS    polyethylene glycol (MIRALAX) packet 17 g  17 g Oral DAILY    traMADoL (ULTRAM) tablet 50 mg  50 mg Oral BID    docusate sodium (COLACE) capsule 100 mg  100 mg Oral DAILY         Prior to Admission medications    Medication Sig Start Date End Date Taking? Authorizing Provider   fexofenadine (ALLEGRA) 180 mg tablet  3/9/22   Provider, Historical   Lantus Solostar U-100 Insulin 100 unit/mL (3 mL) inpn  3/9/22   Provider, Historical   lisinopriL (PRINIVIL, ZESTRIL) 20 mg tablet  3/9/22   Provider, Historical   Januvia 50 mg tablet  3/9/22   Provider, Historical   aspirin delayed-release 81 mg tablet Take 81 mg by mouth daily. Provider, Historical   allopurinoL (ZYLOPRIM) 300 mg tablet Take 300 mg by mouth daily. Provider, Historical   melatonin 3 mg tablet Take 3 mg by mouth nightly. Provider, Historical   furosemide (LASIX) 40 mg tablet Take 40 mg by mouth daily. Provider, Historical   cholecalciferol, vitamin D3, 50 mcg (2,000 unit) tab Take 1 Tablet by mouth daily. Provider, Historical   hydrALAZINE (APRESOLINE) 50 mg tablet Take 25 mg by mouth three (3) times daily.     Other, MD Juan   DULoxetine (CYMBALTA) 60 mg capsule  10/4/21   Other, MD Juan   ferrous sulfate 325 mg (65 mg iron) tablet Take 325 mg by mouth. Juan Christian MD   insulin glargine (LANTUS) 100 unit/mL injection by SubCUTAneous route At bedtime. Juan Christian MD   potassium chloride (KAON 10%) 20 mEq/15 mL solution Take  by mouth daily. Juan Christian MD   verapamil ER (CALAN-SR) 240 mg CR tablet Take 240 mg by mouth nightly.     Juan Christian MD Edison Burger, MD

## 2022-09-21 NOTE — PROGRESS NOTES
..Discharge plan of care/case management plan validated with provider discharge order. Called report to [facility] spoke with Nilson Agrawal RN. Transportation arrived patient left for [facility] via wheelchair family was with patient when she left the building. No questions/concerns voiced.

## 2022-09-22 ENCOUNTER — HOSPITAL ENCOUNTER (OUTPATIENT)
Dept: LAB | Age: 87
Discharge: HOME | End: 2022-09-22

## 2022-09-22 LAB
BACTERIA SPEC CULT: ABNORMAL
BASOPHILS # BLD: 0 K/UL (ref 0–0.1)
BASOPHILS NFR BLD: 0 % (ref 0–1)
COLONY COUNT,CNT: ABNORMAL
DIFFERENTIAL METHOD BLD: ABNORMAL
EOSINOPHIL # BLD: 0.3 K/UL (ref 0–0.4)
EOSINOPHIL NFR BLD: 3 % (ref 0–7)
ERYTHROCYTE [DISTWIDTH] IN BLOOD BY AUTOMATED COUNT: 14.7 % (ref 11.5–14.5)
HCT VFR BLD AUTO: 21.3 % (ref 35–47)
HGB BLD-MCNC: 6.7 G/DL (ref 11.5–16)
IMM GRANULOCYTES # BLD AUTO: 0.1 K/UL (ref 0–0.04)
IMM GRANULOCYTES NFR BLD AUTO: 1 % (ref 0–0.5)
LYMPHOCYTES # BLD: 1.5 K/UL (ref 0.8–3.5)
LYMPHOCYTES NFR BLD: 16 % (ref 12–49)
MCH RBC QN AUTO: 29.9 PG (ref 26–34)
MCHC RBC AUTO-ENTMCNC: 31.5 G/DL (ref 30–36.5)
MCV RBC AUTO: 95.1 FL (ref 80–99)
MONOCYTES # BLD: 1 K/UL (ref 0–1)
MONOCYTES NFR BLD: 11 % (ref 5–13)
NEUTS SEG # BLD: 6.8 K/UL (ref 1.8–8)
NEUTS SEG NFR BLD: 69 % (ref 32–75)
NRBC # BLD: 0 K/UL (ref 0–0.01)
NRBC BLD-RTO: 0 PER 100 WBC
PLATELET # BLD AUTO: 188 K/UL (ref 150–400)
PMV BLD AUTO: 10.4 FL (ref 8.9–12.9)
RBC # BLD AUTO: 2.24 M/UL (ref 3.8–5.2)
SPECIAL REQUESTS,SREQ: ABNORMAL
WBC # BLD AUTO: 9.7 K/UL (ref 3.6–11)

## 2022-09-22 PROCEDURE — 85025 COMPLETE CBC W/AUTO DIFF WBC: CPT

## 2022-09-23 ENCOUNTER — HOSPITAL ENCOUNTER (OUTPATIENT)
Dept: LAB | Age: 87
Discharge: HOME | End: 2022-09-23

## 2022-09-23 ENCOUNTER — HOSPITAL ENCOUNTER (OUTPATIENT)
Dept: INFUSION THERAPY | Age: 87
Discharge: HOME | End: 2022-09-23
Payer: MEDICARE

## 2022-09-23 VITALS
TEMPERATURE: 99.1 F | SYSTOLIC BLOOD PRESSURE: 167 MMHG | OXYGEN SATURATION: 100 % | HEART RATE: 81 BPM | RESPIRATION RATE: 18 BRPM | DIASTOLIC BLOOD PRESSURE: 70 MMHG

## 2022-09-23 LAB
BASOPHILS # BLD: 0.1 K/UL (ref 0–0.1)
BASOPHILS NFR BLD: 1 % (ref 0–1)
DIFFERENTIAL METHOD BLD: ABNORMAL
EOSINOPHIL # BLD: 0.5 K/UL (ref 0–0.4)
EOSINOPHIL NFR BLD: 6 % (ref 0–7)
ERYTHROCYTE [DISTWIDTH] IN BLOOD BY AUTOMATED COUNT: 15 % (ref 11.5–14.5)
HCT VFR BLD AUTO: 15.4 % (ref 35–47)
HGB BLD-MCNC: 5 G/DL (ref 11.5–16)
IMM GRANULOCYTES # BLD AUTO: 0 K/UL
IMM GRANULOCYTES NFR BLD AUTO: 0 %
LYMPHOCYTES # BLD: 1.6 K/UL (ref 0.8–3.5)
LYMPHOCYTES NFR BLD: 18 % (ref 12–49)
MCH RBC QN AUTO: 31.1 PG (ref 26–34)
MCHC RBC AUTO-ENTMCNC: 32.5 G/DL (ref 30–36.5)
MCV RBC AUTO: 95.7 FL (ref 80–99)
MONOCYTES # BLD: 0.9 K/UL (ref 0–1)
MONOCYTES NFR BLD: 10 % (ref 5–13)
NEUTS SEG # BLD: 5.6 K/UL (ref 1.8–8)
NEUTS SEG NFR BLD: 65 % (ref 32–75)
NRBC # BLD: 0 K/UL (ref 0–0.01)
NRBC BLD-RTO: 0 PER 100 WBC
PLATELET # BLD AUTO: 129 K/UL (ref 150–400)
PLATELET COMMENTS,PCOM: ABNORMAL
RBC # BLD AUTO: 1.61 M/UL (ref 3.8–5.2)
RBC MORPH BLD: ABNORMAL
RBC MORPH BLD: ABNORMAL
WBC # BLD AUTO: 8.7 K/UL (ref 3.6–11)

## 2022-09-23 PROCEDURE — 36415 COLL VENOUS BLD VENIPUNCTURE: CPT

## 2022-09-23 PROCEDURE — 86900 BLOOD TYPING SEROLOGIC ABO: CPT

## 2022-09-23 PROCEDURE — 85025 COMPLETE CBC W/AUTO DIFF WBC: CPT

## 2022-09-23 PROCEDURE — P9016 RBC LEUKOCYTES REDUCED: HCPCS

## 2022-09-23 PROCEDURE — 36430 TRANSFUSION BLD/BLD COMPNT: CPT

## 2022-09-23 RX ORDER — SODIUM CHLORIDE 9 MG/ML
250 INJECTION, SOLUTION INTRAVENOUS AS NEEDED
Status: DISCONTINUED | OUTPATIENT
Start: 2022-09-23 | End: 2022-09-25 | Stop reason: HOSPADM

## 2022-09-23 NOTE — PROGRESS NOTES
Pt received 2 units prbcs as ordered. Pt tolerated without complaints or problems. Dc to [facility] via [facility].

## 2022-09-24 ENCOUNTER — HOSPITAL ENCOUNTER (OUTPATIENT)
Dept: LAB | Age: 87
Discharge: HOME | End: 2022-09-24

## 2022-09-24 LAB
BASOPHILS # BLD: 0 K/UL (ref 0–0.1)
BASOPHILS NFR BLD: 1 % (ref 0–1)
DIFFERENTIAL METHOD BLD: ABNORMAL
EOSINOPHIL # BLD: 0.5 K/UL (ref 0–0.4)
EOSINOPHIL NFR BLD: 6 % (ref 0–7)
ERYTHROCYTE [DISTWIDTH] IN BLOOD BY AUTOMATED COUNT: 16.3 % (ref 11.5–14.5)
HCT VFR BLD AUTO: 31.4 % (ref 35–47)
HGB BLD-MCNC: 10.4 G/DL (ref 11.5–16)
IMM GRANULOCYTES # BLD AUTO: 0 K/UL (ref 0–0.04)
IMM GRANULOCYTES NFR BLD AUTO: 1 % (ref 0–0.5)
LYMPHOCYTES # BLD: 1.5 K/UL (ref 0.8–3.5)
LYMPHOCYTES NFR BLD: 20 % (ref 12–49)
MCH RBC QN AUTO: 30.1 PG (ref 26–34)
MCHC RBC AUTO-ENTMCNC: 33.1 G/DL (ref 30–36.5)
MCV RBC AUTO: 91 FL (ref 80–99)
MONOCYTES # BLD: 0.9 K/UL (ref 0–1)
MONOCYTES NFR BLD: 12 % (ref 5–13)
NEUTS SEG # BLD: 4.7 K/UL (ref 1.8–8)
NEUTS SEG NFR BLD: 60 % (ref 32–75)
NRBC # BLD: 0 K/UL (ref 0–0.01)
NRBC BLD-RTO: 0 PER 100 WBC
PLATELET # BLD AUTO: 221 K/UL (ref 150–400)
PMV BLD AUTO: 10.5 FL (ref 8.9–12.9)
RBC # BLD AUTO: 3.45 M/UL (ref 3.8–5.2)
WBC # BLD AUTO: 7.6 K/UL (ref 3.6–11)

## 2022-09-24 PROCEDURE — 85025 COMPLETE CBC W/AUTO DIFF WBC: CPT

## 2022-09-24 PROCEDURE — 36415 COLL VENOUS BLD VENIPUNCTURE: CPT

## 2022-09-25 LAB
ABO + RH BLD: NORMAL
BLD PROD TYP BPU: NORMAL
BLD PROD TYP BPU: NORMAL
BLOOD GROUP ANTIBODIES SERPL: NEGATIVE
BPU ID: NORMAL
BPU ID: NORMAL
CROSSMATCH RESULT,%XM: NORMAL
CROSSMATCH RESULT,%XM: NORMAL
SPECIMEN EXP DATE BLD: NORMAL
STATUS OF UNIT,%ST: NORMAL
STATUS OF UNIT,%ST: NORMAL
TRANSFUSION STATUS PATIENT QL: NORMAL
TRANSFUSION STATUS PATIENT QL: NORMAL
UNIT DIVISION, %UDIV: 0
UNIT DIVISION, %UDIV: 0

## 2022-11-02 ENCOUNTER — APPOINTMENT (OUTPATIENT)
Dept: GENERAL RADIOLOGY | Age: 87
End: 2022-11-02
Attending: EMERGENCY MEDICINE
Payer: MEDICARE

## 2022-11-02 ENCOUNTER — HOSPITAL ENCOUNTER (EMERGENCY)
Age: 87
Discharge: HOME | End: 2022-11-02
Attending: STUDENT IN AN ORGANIZED HEALTH CARE EDUCATION/TRAINING PROGRAM
Payer: MEDICARE

## 2022-11-02 VITALS
TEMPERATURE: 98.3 F | WEIGHT: 125 LBS | DIASTOLIC BLOOD PRESSURE: 69 MMHG | HEART RATE: 79 BPM | RESPIRATION RATE: 19 BRPM | BODY MASS INDEX: 23.6 KG/M2 | SYSTOLIC BLOOD PRESSURE: 188 MMHG | OXYGEN SATURATION: 98 % | HEIGHT: 61 IN

## 2022-11-02 DIAGNOSIS — E86.0 DEHYDRATION: ICD-10-CM

## 2022-11-02 DIAGNOSIS — R53.1 WEAKNESS: Primary | ICD-10-CM

## 2022-11-02 LAB
ALBUMIN SERPL-MCNC: 3 G/DL (ref 3.5–5)
ALBUMIN/GLOB SERPL: 0.8 {RATIO} (ref 1.1–2.2)
ALP SERPL-CCNC: 85 U/L (ref 45–117)
ALT SERPL-CCNC: 6 U/L (ref 12–78)
ANION GAP SERPL CALC-SCNC: 5 MMOL/L (ref 5–15)
APPEARANCE UR: CLEAR
AST SERPL W P-5'-P-CCNC: 5 U/L (ref 15–37)
ATRIAL RATE: 76 BPM
BACTERIA URNS QL MICRO: NEGATIVE /HPF
BACTERIA URNS QL MICRO: NEGATIVE /HPF
BASOPHILS # BLD: 0.1 K/UL (ref 0–0.1)
BASOPHILS NFR BLD: 1 % (ref 0–1)
BILIRUB SERPL-MCNC: 0.4 MG/DL (ref 0.2–1)
BILIRUB UR QL: NEGATIVE
BUN SERPL-MCNC: 24 MG/DL (ref 6–20)
BUN/CREAT SERPL: 15 (ref 12–20)
CA-I BLD-MCNC: 9.5 MG/DL (ref 8.5–10.1)
CALCULATED P AXIS, ECG09: 57 DEGREES
CALCULATED R AXIS, ECG10: -46 DEGREES
CALCULATED T AXIS, ECG11: 9 DEGREES
CHLORIDE SERPL-SCNC: 106 MMOL/L (ref 97–108)
CO2 SERPL-SCNC: 29 MMOL/L (ref 21–32)
COLOR UR: ABNORMAL
CREAT SERPL-MCNC: 1.65 MG/DL (ref 0.55–1.02)
DIAGNOSIS, 93000: NORMAL
DIFFERENTIAL METHOD BLD: ABNORMAL
EOSINOPHIL # BLD: 0.4 K/UL (ref 0–0.4)
EOSINOPHIL NFR BLD: 4 % (ref 0–7)
ERYTHROCYTE [DISTWIDTH] IN BLOOD BY AUTOMATED COUNT: 14.6 % (ref 11.5–14.5)
GLOBULIN SER CALC-MCNC: 3.7 G/DL (ref 2–4)
GLUCOSE SERPL-MCNC: 103 MG/DL (ref 65–100)
GLUCOSE UR STRIP.AUTO-MCNC: NEGATIVE MG/DL
HCT VFR BLD AUTO: 37.4 % (ref 35–47)
HGB BLD-MCNC: 12.3 G/DL (ref 11.5–16)
HGB UR QL STRIP: NEGATIVE
IMM GRANULOCYTES # BLD AUTO: 0.1 K/UL (ref 0–0.04)
IMM GRANULOCYTES NFR BLD AUTO: 1 % (ref 0–0.5)
KETONES UR QL STRIP.AUTO: NEGATIVE MG/DL
LEUKOCYTE ESTERASE UR QL STRIP.AUTO: NEGATIVE
LYMPHOCYTES # BLD: 1.5 K/UL (ref 0.8–3.5)
LYMPHOCYTES NFR BLD: 15 % (ref 12–49)
MCH RBC QN AUTO: 30.3 PG (ref 26–34)
MCHC RBC AUTO-ENTMCNC: 32.9 G/DL (ref 30–36.5)
MCV RBC AUTO: 92.1 FL (ref 80–99)
MONOCYTES # BLD: 0.8 K/UL (ref 0–1)
MONOCYTES NFR BLD: 8 % (ref 5–13)
NEUTS SEG # BLD: 7.5 K/UL (ref 1.8–8)
NEUTS SEG NFR BLD: 71 % (ref 32–75)
NITRITE UR QL STRIP.AUTO: NEGATIVE
NRBC # BLD: 0 K/UL (ref 0–0.01)
NRBC BLD-RTO: 0 PER 100 WBC
P-R INTERVAL, ECG05: 162 MS
PH UR STRIP: 5 [PH] (ref 5–8)
PLATELET # BLD AUTO: 187 K/UL (ref 150–400)
PMV BLD AUTO: 10.7 FL (ref 8.9–12.9)
POTASSIUM SERPL-SCNC: 3.7 MMOL/L (ref 3.5–5.1)
PROT SERPL-MCNC: 6.7 G/DL (ref 6.4–8.2)
PROT UR STRIP-MCNC: 30 MG/DL
Q-T INTERVAL, ECG07: 440 MS
QRS DURATION, ECG06: 120 MS
QTC CALCULATION (BEZET), ECG08: 495 MS
RBC # BLD AUTO: 4.06 M/UL (ref 3.8–5.2)
RBC #/AREA URNS HPF: ABNORMAL /HPF (ref 0–5)
RBC #/AREA URNS HPF: NORMAL /HPF (ref 0–5)
SODIUM SERPL-SCNC: 140 MMOL/L (ref 136–145)
SP GR UR REFRACTOMETRY: 1.02 (ref 1–1.03)
TROPONIN-HIGH SENSITIVITY: 13 NG/L (ref 0–51)
UROBILINOGEN UR QL STRIP.AUTO: 0.1 EU/DL (ref 0.1–1)
VENTRICULAR RATE, ECG03: 76 BPM
WBC # BLD AUTO: 10.3 K/UL (ref 3.6–11)
WBC URNS QL MICRO: ABNORMAL /HPF (ref 0–4)
WBC URNS QL MICRO: NORMAL /HPF (ref 0–4)

## 2022-11-02 PROCEDURE — 80053 COMPREHEN METABOLIC PANEL: CPT

## 2022-11-02 PROCEDURE — 81001 URINALYSIS AUTO W/SCOPE: CPT

## 2022-11-02 PROCEDURE — 93005 ELECTROCARDIOGRAM TRACING: CPT

## 2022-11-02 PROCEDURE — 85025 COMPLETE CBC W/AUTO DIFF WBC: CPT

## 2022-11-02 PROCEDURE — 36415 COLL VENOUS BLD VENIPUNCTURE: CPT

## 2022-11-02 PROCEDURE — 74011250637 HC RX REV CODE- 250/637: Performed by: STUDENT IN AN ORGANIZED HEALTH CARE EDUCATION/TRAINING PROGRAM

## 2022-11-02 PROCEDURE — 99285 EMERGENCY DEPT VISIT HI MDM: CPT

## 2022-11-02 PROCEDURE — 74011250636 HC RX REV CODE- 250/636: Performed by: STUDENT IN AN ORGANIZED HEALTH CARE EDUCATION/TRAINING PROGRAM

## 2022-11-02 PROCEDURE — 71045 X-RAY EXAM CHEST 1 VIEW: CPT

## 2022-11-02 PROCEDURE — 84484 ASSAY OF TROPONIN QUANT: CPT

## 2022-11-02 RX ORDER — ACETAMINOPHEN 325 MG/1
650 TABLET ORAL ONCE
Status: COMPLETED | OUTPATIENT
Start: 2022-11-02 | End: 2022-11-02

## 2022-11-02 RX ORDER — HYDRALAZINE HYDROCHLORIDE 25 MG/1
25 TABLET, FILM COATED ORAL ONCE
Status: COMPLETED | OUTPATIENT
Start: 2022-11-02 | End: 2022-11-02

## 2022-11-02 RX ADMIN — ACETAMINOPHEN 650 MG: 325 TABLET, FILM COATED ORAL at 11:50

## 2022-11-02 RX ADMIN — HYDRALAZINE HYDROCHLORIDE 25 MG: 25 TABLET, FILM COATED ORAL at 14:36

## 2022-11-02 RX ADMIN — SODIUM CHLORIDE 500 ML: 9 INJECTION, SOLUTION INTRAVENOUS at 11:50

## 2022-11-02 NOTE — ED TRIAGE NOTES
[facility] pt called EMS for increased weakness and general malaise; family also reported that pt has lost some weight recently and has a poor appetite

## 2022-11-02 NOTE — ED NOTES
Assumed care of pt. Bedside shift report received from Mary Grace qureshi [facility]. Pt in bed resting quietly with no complaints at this time.

## 2022-11-02 NOTE — ED PROVIDER NOTES
EMERGENCY DEPARTMENT HISTORY AND PHYSICAL EXAM      Date: 11/2/2022  Patient Name: Jody Dave    History of Presenting Illness     CC: Generalized weakness      History Provided By: Patient and Patient's Daughter    HPI: Jody Dave, 80 y.o. female with a past medical history significant  CHF,  presents to the ED with cc of generalized weakness, difficulty ambulating, malaise for 1 week. Family notes that mother has been complaining of increased weakness, difficulty getting to wheelchair, patient nonambulatory at baseline. Patient denies any fevers chills, is complaining of total body pains. No cough, no shortness of breath, no abdominal pain nausea or vomiting. There are no other complaints, changes, or physical findings at this time. PCP: Rosaura Meza MD    Current Outpatient Medications   Medication Sig Dispense Refill    fexofenadine (ALLEGRA) 180 mg tablet       lisinopriL (PRINIVIL, ZESTRIL) 20 mg tablet       Januvia 50 mg tablet       allopurinoL (ZYLOPRIM) 300 mg tablet Take 300 mg by mouth daily. melatonin 3 mg tablet Take 3 mg by mouth nightly. furosemide (LASIX) 40 mg tablet Take 40 mg by mouth daily. cholecalciferol, vitamin D3, 50 mcg (2,000 unit) tab Take 1 Tablet by mouth daily. ferrous sulfate 325 mg (65 mg iron) tablet Take 325 mg by mouth. insulin glargine (LANTUS) 100 unit/mL injection by SubCUTAneous route At bedtime. potassium chloride (KAON 10%) 20 mEq/15 mL solution Take  by mouth daily.          Past History     Past Medical History:  Past Medical History:   Diagnosis Date    Arthritis     CHF (congestive heart failure) ([facility]     Chronic kidney disease     Diabetes ([facility]     Heart failure ([facility]     Hypertension        Past Surgical History:  Past Surgical History:   Procedure Laterality Date    HX APPENDECTOMY      HX BILATERAL SALPINGO-OOPHORECTOMY  1971    HX HEENT      lump removal on head    HX HYSTERECTOMY      IR KYPHOPLASTY LUMBAR 5/4/2022       Family History:  Family History   Problem Relation Age of Onset    Hypertension Mother        Social History:  Social History     Tobacco Use    Smoking status: Never    Smokeless tobacco: Never   Vaping Use    Vaping Use: Never used   Substance Use Topics    Alcohol use: Never    Drug use: Never       Allergies:  No Known Allergies      Review of Systems     Review of Systems   Constitutional:  Positive for appetite change and fatigue. Negative for activity change, chills and fever. HENT:  Negative for congestion and trouble swallowing. Eyes:  Negative for photophobia and visual disturbance. Respiratory:  Negative for cough, chest tightness and shortness of breath. Cardiovascular:  Negative for chest pain and palpitations. Gastrointestinal:  Negative for abdominal distention, abdominal pain, diarrhea, nausea and vomiting. Genitourinary:  Negative for dysuria, flank pain and frequency. Musculoskeletal:  Positive for myalgias. Negative for arthralgias and back pain. Skin:  Negative for color change, pallor and rash. Neurological:  Positive for weakness and headaches. Negative for dizziness and tremors. Psychiatric/Behavioral:  Negative for confusion. Physical Exam     Physical Exam  Vitals and nursing note reviewed. Constitutional:       General: She is not in acute distress. Appearance: Normal appearance. She is normal weight. She is not ill-appearing. HENT:      Head: Normocephalic and atraumatic. Nose: Nose normal.      Mouth/Throat:      Mouth: Mucous membranes are moist.   Eyes:      Extraocular Movements: Extraocular movements intact. Pupils: Pupils are equal, round, and reactive to light. Cardiovascular:      Rate and Rhythm: Normal rate and regular rhythm. Pulses: Normal pulses. Heart sounds: Normal heart sounds. Pulmonary:      Effort: Pulmonary effort is normal.      Breath sounds: Normal breath sounds.    Abdominal:      General: Abdomen is flat. Bowel sounds are normal.      Palpations: Abdomen is soft. Tenderness: There is no abdominal tenderness. There is no guarding. Musculoskeletal:         General: No tenderness. Normal range of motion. Cervical back: Normal range of motion and neck supple. No muscular tenderness. Skin:     General: Skin is warm and dry. Neurological:      General: No focal deficit present. Mental Status: She is alert. Sensory: No sensory deficit. Motor: No weakness. Diagnostic Study Results     Labs -     No results found for this or any previous visit (from the past 12 hour(s)). Radiologic Studies -   [unfilled]  CT Results  (Last 48 hours)      None          CXR Results  (Last 48 hours)      None            Medical Decision Making and ED Course   I am the first provider for this patient. I reviewed the vital signs, available nursing notes, past medical history, past surgical history, family history and social history. Vital Signs-Reviewed the patient's vital signs. No data found. EKG:   Completed 1038, interpreted 1043 by myself, normal sinus rhythm, no ST elevations, T wave inversions in inferior leads right bundle branch block    Records Reviewed: Nursing Notes and Old Medical Records    The patient presents with generalized weakness, fatigue, malaise with a differential diagnosis of viral URI, dehydration, UTI, electrolyte abnormality      Provider Notes (Medical Decision Making):     MDM  80-year-old female history of hypertension, diabetes, wheelchair-bound, presents emergency department for weakness general legs over the last 1 to 2 weeks. Physical shows chronically ill-appearing female however in no distress, complaining of generalized pains, stable vitals, on clear breath sounds abdomen soft nontender, nonfocal neurological exam.    Basic lab works drawn, UA ordered, will administer IV fluids, Tylenol, continue to monitor.       ED Course:   Initial assessment performed. The patients presenting problems have been discussed, and they are in agreement with the care plan formulated and outlined with them. I have encouraged them to ask questions as they arise throughout their visit. ED Course as of 11/04/22 2228 Wed Nov 02, 2022   1351 Resulting, no leukocytosis, stable H&H, metabolic panel shows mild renal insufficiency BUN 24 creatinine 1.65. Troponin 13. [PZ]   1458 Patient's urinalysis shows no white blood cells no leukocytes negative nitrates. Patient more awake alert ate pack of crackers, feel that most likely patient suffering from dehydration, will discharge patient home with family, no indication for antibiotics at this time, instructed to follow-up with primary care physician in the next week, return to emergency department for any worsening weakness dizziness chest pains or shortness of breath. [PZ]      ED Course User Index  [PZ] Bianka Richardson MD         Procedures       Jose Rees MD  Procedures                   Disposition       Discharged    Remove if not discharged  DISCHARGE PLAN:  1. Current Discharge Medication List        CONTINUE these medications which have NOT CHANGED    Details   fexofenadine (ALLEGRA) 180 mg tablet       lisinopriL (PRINIVIL, ZESTRIL) 20 mg tablet       Januvia 50 mg tablet       allopurinoL (ZYLOPRIM) 300 mg tablet Take 300 mg by mouth daily. melatonin 3 mg tablet Take 3 mg by mouth nightly. furosemide (LASIX) 40 mg tablet Take 40 mg by mouth daily. cholecalciferol, vitamin D3, 50 mcg (2,000 unit) tab Take 1 Tablet by mouth daily. ferrous sulfate 325 mg (65 mg iron) tablet Take 325 mg by mouth. insulin glargine (LANTUS) 100 unit/mL injection by SubCUTAneous route At bedtime. potassium chloride (KAON 10%) 20 mEq/15 mL solution Take  by mouth daily.            2.   Follow-up Information       Follow up With Specialties Details Why Karen Thacker MD [facility] In 1 week As needed [address]  [phone]            3. Return to ED if worse     Diagnosis     Clinical Impression:   1. Weakness    2. Dehydration        Attestations:    Jovani Jordan MD    Please note that this dictation was completed with [employer], the computer voice recognition software. Quite often unanticipated grammatical, syntax, homophones, and other interpretive errors are inadvertently transcribed by the computer software. Please disregard these errors. Please excuse any errors that have escaped final proofreading. Thank you.

## 2022-11-24 ENCOUNTER — APPOINTMENT (OUTPATIENT)
Dept: GENERAL RADIOLOGY | Age: 87
DRG: 194 | End: 2022-11-24
Attending: EMERGENCY MEDICINE
Payer: MEDICARE

## 2022-11-24 ENCOUNTER — HOSPITAL ENCOUNTER (INPATIENT)
Age: 87
LOS: 2 days | Discharge: HOSPICE | DRG: 194 | End: 2022-11-26
Attending: EMERGENCY MEDICINE | Admitting: INTERNAL MEDICINE
Payer: MEDICARE

## 2022-11-24 DIAGNOSIS — J10.1 INFLUENZA A: Primary | ICD-10-CM

## 2022-11-24 DIAGNOSIS — N39.0 URINARY TRACT INFECTION WITHOUT HEMATURIA, SITE UNSPECIFIED: ICD-10-CM

## 2022-11-24 LAB
ALBUMIN SERPL-MCNC: 2.3 G/DL (ref 3.5–5)
ALBUMIN/GLOB SERPL: 0.5 {RATIO} (ref 1.1–2.2)
ALP SERPL-CCNC: 50 U/L (ref 45–117)
ALT SERPL-CCNC: 14 U/L (ref 12–78)
ANION GAP SERPL CALC-SCNC: 3 MMOL/L (ref 5–15)
APPEARANCE UR: CLEAR
AST SERPL W P-5'-P-CCNC: ABNORMAL U/L (ref 15–37)
BACTERIA URNS QL MICRO: NEGATIVE /HPF
BASOPHILS # BLD: 0.1 K/UL (ref 0–0.1)
BASOPHILS NFR BLD: 1 % (ref 0–1)
BILIRUB SERPL-MCNC: 0.4 MG/DL (ref 0.2–1)
BILIRUB UR QL: NEGATIVE
BUN SERPL-MCNC: 26 MG/DL (ref 6–20)
BUN/CREAT SERPL: 19 (ref 12–20)
CA-I BLD-MCNC: 8.4 MG/DL (ref 8.5–10.1)
CHLORIDE SERPL-SCNC: 106 MMOL/L (ref 97–108)
CO2 SERPL-SCNC: 22 MMOL/L (ref 21–32)
COLOR UR: ABNORMAL
COVID-19 RAPID TEST, COVR: NOT DETECTED
CREAT SERPL-MCNC: 1.39 MG/DL (ref 0.55–1.02)
DIFFERENTIAL METHOD BLD: ABNORMAL
EOSINOPHIL # BLD: 0 K/UL (ref 0–0.4)
EOSINOPHIL NFR BLD: 0 % (ref 0–7)
ERYTHROCYTE [DISTWIDTH] IN BLOOD BY AUTOMATED COUNT: 15.1 % (ref 11.5–14.5)
FLUAV AG NPH QL IA: POSITIVE
FLUBV AG NOSE QL IA: NEGATIVE
GLOBULIN SER CALC-MCNC: 4.3 G/DL (ref 2–4)
GLUCOSE BLD STRIP.AUTO-MCNC: 100 MG/DL (ref 65–100)
GLUCOSE BLD STRIP.AUTO-MCNC: 111 MG/DL (ref 65–100)
GLUCOSE SERPL-MCNC: 102 MG/DL (ref 65–100)
GLUCOSE UR STRIP.AUTO-MCNC: NEGATIVE MG/DL
HCT VFR BLD AUTO: 39.2 % (ref 35–47)
HGB BLD-MCNC: 12.6 G/DL (ref 11.5–16)
HGB UR QL STRIP: NEGATIVE
HYALINE CASTS URNS QL MICRO: ABNORMAL /LPF (ref 0–5)
IMM GRANULOCYTES # BLD AUTO: 0 K/UL (ref 0–0.04)
IMM GRANULOCYTES NFR BLD AUTO: 0 % (ref 0–0.5)
KETONES UR QL STRIP.AUTO: NEGATIVE MG/DL
LACTATE SERPL-SCNC: 1.2 MMOL/L (ref 0.4–2)
LEUKOCYTE ESTERASE UR QL STRIP.AUTO: NEGATIVE
LYMPHOCYTES # BLD: 1.1 K/UL (ref 0.8–3.5)
LYMPHOCYTES NFR BLD: 12 % (ref 12–49)
MCH RBC QN AUTO: 29.8 PG (ref 26–34)
MCHC RBC AUTO-ENTMCNC: 32.1 G/DL (ref 30–36.5)
MCV RBC AUTO: 92.7 FL (ref 80–99)
MONOCYTES # BLD: 0.8 K/UL (ref 0–1)
MONOCYTES NFR BLD: 9 % (ref 5–13)
MUCOUS THREADS URNS QL MICRO: ABNORMAL /LPF
NEUTS SEG # BLD: 6.9 K/UL (ref 1.8–8)
NEUTS SEG NFR BLD: 78 % (ref 32–75)
NITRITE UR QL STRIP.AUTO: NEGATIVE
NRBC # BLD: 0 K/UL (ref 0–0.01)
NRBC BLD-RTO: 0 PER 100 WBC
PERFORMED BY, TECHID: ABNORMAL
PERFORMED BY, TECHID: NORMAL
PH UR STRIP: 5 [PH] (ref 5–8)
PLATELET # BLD AUTO: 174 K/UL (ref 150–400)
PMV BLD AUTO: 11 FL (ref 8.9–12.9)
POTASSIUM SERPL-SCNC: ABNORMAL MMOL/L (ref 3.5–5.1)
PROT SERPL-MCNC: 6.6 G/DL (ref 6.4–8.2)
PROT UR STRIP-MCNC: 30 MG/DL
RBC # BLD AUTO: 4.23 M/UL (ref 3.8–5.2)
RBC #/AREA URNS HPF: ABNORMAL /HPF (ref 0–5)
SODIUM SERPL-SCNC: 131 MMOL/L (ref 136–145)
SP GR UR REFRACTOMETRY: 1.02 (ref 1–1.03)
TROPONIN-HIGH SENSITIVITY: 16 NG/L (ref 0–51)
UA: UC IF INDICATED,UAUC: ABNORMAL
UROBILINOGEN UR QL STRIP.AUTO: 0.1 EU/DL (ref 0.1–1)
WBC # BLD AUTO: 8.9 K/UL (ref 3.6–11)
WBC URNS QL MICRO: ABNORMAL /HPF (ref 0–4)

## 2022-11-24 PROCEDURE — 87086 URINE CULTURE/COLONY COUNT: CPT

## 2022-11-24 PROCEDURE — 87804 INFLUENZA ASSAY W/OPTIC: CPT

## 2022-11-24 PROCEDURE — 65270000029 HC RM PRIVATE

## 2022-11-24 PROCEDURE — 82962 GLUCOSE BLOOD TEST: CPT

## 2022-11-24 PROCEDURE — 71045 X-RAY EXAM CHEST 1 VIEW: CPT

## 2022-11-24 PROCEDURE — 99285 EMERGENCY DEPT VISIT HI MDM: CPT

## 2022-11-24 PROCEDURE — 83605 ASSAY OF LACTIC ACID: CPT

## 2022-11-24 PROCEDURE — 74011250637 HC RX REV CODE- 250/637: Performed by: INTERNAL MEDICINE

## 2022-11-24 PROCEDURE — 85025 COMPLETE CBC W/AUTO DIFF WBC: CPT

## 2022-11-24 PROCEDURE — 74011250636 HC RX REV CODE- 250/636: Performed by: EMERGENCY MEDICINE

## 2022-11-24 PROCEDURE — 81001 URINALYSIS AUTO W/SCOPE: CPT

## 2022-11-24 PROCEDURE — 93005 ELECTROCARDIOGRAM TRACING: CPT

## 2022-11-24 PROCEDURE — 74011250636 HC RX REV CODE- 250/636: Performed by: INTERNAL MEDICINE

## 2022-11-24 PROCEDURE — 80053 COMPREHEN METABOLIC PANEL: CPT

## 2022-11-24 PROCEDURE — 87635 SARS-COV-2 COVID-19 AMP PRB: CPT

## 2022-11-24 PROCEDURE — 87040 BLOOD CULTURE FOR BACTERIA: CPT

## 2022-11-24 PROCEDURE — 74011000250 HC RX REV CODE- 250: Performed by: INTERNAL MEDICINE

## 2022-11-24 PROCEDURE — 74011000250 HC RX REV CODE- 250: Performed by: EMERGENCY MEDICINE

## 2022-11-24 PROCEDURE — 87077 CULTURE AEROBIC IDENTIFY: CPT

## 2022-11-24 PROCEDURE — 87186 SC STD MICRODIL/AGAR DIL: CPT

## 2022-11-24 PROCEDURE — 84484 ASSAY OF TROPONIN QUANT: CPT

## 2022-11-24 RX ORDER — OSELTAMIVIR PHOSPHATE 30 MG/1
30 CAPSULE ORAL
Status: DISCONTINUED | OUTPATIENT
Start: 2022-11-24 | End: 2022-11-24

## 2022-11-24 RX ORDER — MELATONIN
2000 DAILY
Status: DISCONTINUED | OUTPATIENT
Start: 2022-11-25 | End: 2022-11-26 | Stop reason: HOSPADM

## 2022-11-24 RX ORDER — OSELTAMIVIR PHOSPHATE 75 MG/1
75 CAPSULE ORAL EVERY 12 HOURS
Status: DISCONTINUED | OUTPATIENT
Start: 2022-11-24 | End: 2022-11-26 | Stop reason: HOSPADM

## 2022-11-24 RX ORDER — ENOXAPARIN SODIUM 100 MG/ML
30 INJECTION SUBCUTANEOUS DAILY
Status: DISCONTINUED | OUTPATIENT
Start: 2022-11-25 | End: 2022-11-26 | Stop reason: HOSPADM

## 2022-11-24 RX ORDER — ACETAMINOPHEN 650 MG/1
650 SUPPOSITORY RECTAL
Status: DISCONTINUED | OUTPATIENT
Start: 2022-11-24 | End: 2022-11-26 | Stop reason: HOSPADM

## 2022-11-24 RX ORDER — ONDANSETRON 2 MG/ML
4 INJECTION INTRAMUSCULAR; INTRAVENOUS
Status: DISCONTINUED | OUTPATIENT
Start: 2022-11-24 | End: 2022-11-26 | Stop reason: HOSPADM

## 2022-11-24 RX ORDER — SODIUM CHLORIDE 9 MG/ML
75 INJECTION, SOLUTION INTRAVENOUS ONCE
Status: COMPLETED | OUTPATIENT
Start: 2022-11-24 | End: 2022-11-24

## 2022-11-24 RX ORDER — ONDANSETRON 4 MG/1
4 TABLET, ORALLY DISINTEGRATING ORAL
Status: DISCONTINUED | OUTPATIENT
Start: 2022-11-24 | End: 2022-11-26 | Stop reason: HOSPADM

## 2022-11-24 RX ORDER — LANOLIN ALCOHOL/MO/W.PET/CERES
3 CREAM (GRAM) TOPICAL
Status: DISCONTINUED | OUTPATIENT
Start: 2022-11-24 | End: 2022-11-26 | Stop reason: HOSPADM

## 2022-11-24 RX ORDER — OSELTAMIVIR PHOSPHATE 75 MG/1
75 CAPSULE ORAL 2 TIMES DAILY
Qty: 10 CAPSULE | Refills: 0 | Status: SHIPPED | OUTPATIENT
Start: 2022-11-24 | End: 2022-11-29

## 2022-11-24 RX ORDER — LANOLIN ALCOHOL/MO/W.PET/CERES
325 CREAM (GRAM) TOPICAL
Status: DISCONTINUED | OUTPATIENT
Start: 2022-11-25 | End: 2022-11-26 | Stop reason: HOSPADM

## 2022-11-24 RX ORDER — POLYETHYLENE GLYCOL 3350 17 G/17G
17 POWDER, FOR SOLUTION ORAL DAILY PRN
Status: DISCONTINUED | OUTPATIENT
Start: 2022-11-24 | End: 2022-11-26 | Stop reason: HOSPADM

## 2022-11-24 RX ORDER — ONDANSETRON 4 MG/1
4 TABLET, ORALLY DISINTEGRATING ORAL
Qty: 15 TABLET | Refills: 0 | Status: SHIPPED | OUTPATIENT
Start: 2022-11-24

## 2022-11-24 RX ORDER — SODIUM CHLORIDE 0.9 % (FLUSH) 0.9 %
5-40 SYRINGE (ML) INJECTION EVERY 8 HOURS
Status: DISCONTINUED | OUTPATIENT
Start: 2022-11-24 | End: 2022-11-26 | Stop reason: HOSPADM

## 2022-11-24 RX ORDER — ALOGLIPTIN 6.25 MG/1
6.25 TABLET, FILM COATED ORAL DAILY
Status: DISCONTINUED | OUTPATIENT
Start: 2022-11-25 | End: 2022-11-26 | Stop reason: HOSPADM

## 2022-11-24 RX ORDER — ACETAMINOPHEN 325 MG/1
650 TABLET ORAL
Status: DISCONTINUED | OUTPATIENT
Start: 2022-11-24 | End: 2022-11-26 | Stop reason: HOSPADM

## 2022-11-24 RX ORDER — ALLOPURINOL 100 MG/1
300 TABLET ORAL DAILY
Status: DISCONTINUED | OUTPATIENT
Start: 2022-11-25 | End: 2022-11-26 | Stop reason: HOSPADM

## 2022-11-24 RX ORDER — HYDRALAZINE HYDROCHLORIDE 20 MG/ML
10 INJECTION INTRAMUSCULAR; INTRAVENOUS
Status: DISCONTINUED | OUTPATIENT
Start: 2022-11-24 | End: 2022-11-26 | Stop reason: HOSPADM

## 2022-11-24 RX ORDER — INSULIN LISPRO 100 [IU]/ML
INJECTION, SOLUTION INTRAVENOUS; SUBCUTANEOUS
Status: DISCONTINUED | OUTPATIENT
Start: 2022-11-24 | End: 2022-11-26 | Stop reason: HOSPADM

## 2022-11-24 RX ORDER — OSELTAMIVIR PHOSPHATE 30 MG/1
30 CAPSULE ORAL DAILY
Status: DISCONTINUED | OUTPATIENT
Start: 2022-11-25 | End: 2022-11-24

## 2022-11-24 RX ORDER — SODIUM CHLORIDE 0.9 % (FLUSH) 0.9 %
5-40 SYRINGE (ML) INJECTION AS NEEDED
Status: DISCONTINUED | OUTPATIENT
Start: 2022-11-24 | End: 2022-11-26 | Stop reason: HOSPADM

## 2022-11-24 RX ADMIN — CEFTRIAXONE SODIUM 1 G: 1 INJECTION, POWDER, FOR SOLUTION INTRAMUSCULAR; INTRAVENOUS at 15:37

## 2022-11-24 RX ADMIN — MELATONIN TAB 3 MG 3 MG: 3 TAB at 21:22

## 2022-11-24 RX ADMIN — SODIUM CHLORIDE 500 ML: 9 INJECTION, SOLUTION INTRAVENOUS at 12:56

## 2022-11-24 RX ADMIN — SODIUM CHLORIDE 75 ML/HR: 9 INJECTION, SOLUTION INTRAVENOUS at 15:37

## 2022-11-24 RX ADMIN — HYDRALAZINE HYDROCHLORIDE 10 MG: 20 INJECTION, SOLUTION INTRAMUSCULAR; INTRAVENOUS at 20:12

## 2022-11-24 RX ADMIN — SODIUM CHLORIDE, PRESERVATIVE FREE 10 ML: 5 INJECTION INTRAVENOUS at 21:19

## 2022-11-24 NOTE — PROGRESS NOTES
Renal Dosing/Monitoring  Medication: Oseltamivir   Current regimen:  75 mg every 12 hours  Recent Labs     11/24/22  1206   CREA 1.39*   BUN 26*     Estimated CrCl:  21.1 mL/min  Plan: Change to 30 mg q24h  per [facility] P&T Committee Protocol with respect to renal function. Pharmacy will continue to monitor patient daily and will make dosage adjustments based upon changing renal function.

## 2022-11-24 NOTE — ROUTINE PROCESS
>TRANSFER - OUT REPORT:    Verbal report given to tubed to [address] [place] [address]  being transferred to [facility] for routine progression of care       Report consisted of patients Situation, Background, Assessment and   Recommendations(SBAR). Information from the following report(s) SBAR, Kardex, ED Summary, and Recent Results was reviewed with the receiving nurse. Lines:   Peripheral IV 11/24/22 Posterior; Left Hand (Active)       Peripheral IV 11/24/22 Left Antecubital (Active)        Opportunity for questions and clarification was provided.       Patient transported with:   Monitor  Tech

## 2022-11-24 NOTE — DISCHARGE INSTRUCTIONS
Thank you! Thank you for allowing me to care for you in the emergency department. It is my goal to provide you with excellent care. If you have not received excellent quality care, please ask to speak to the nurse manager. Please fill out the survey that will come to you by mail or email since we listen to your feedback! Below you will find a list of your tests from today's visit. Should you have any questions, please do not hesitate to call the emergency department. Labs  Recent Results (from the past 12 hour(s))   CBC WITH AUTOMATED DIFF    Collection Time: 11/24/22 12:06 PM   Result Value Ref Range    WBC 8.9 3.6 - 11.0 K/uL    RBC 4.23 3.80 - 5.20 M/uL    HGB 12.6 11.5 - 16.0 g/dL    HCT 39.2 35.0 - 47.0 %    MCV 92.7 80.0 - 99.0 FL    MCH 29.8 26.0 - 34.0 PG    MCHC 32.1 30.0 - 36.5 g/dL    RDW 15.1 (H) 11.5 - 14.5 %    PLATELET 501 278 - 347 K/uL    MPV 11.0 8.9 - 12.9 FL    NRBC 0.0 0.0  WBC    ABSOLUTE NRBC 0.00 0.00 - 0.01 K/uL    NEUTROPHILS 78 (H) 32 - 75 %    LYMPHOCYTES 12 12 - 49 %    MONOCYTES 9 5 - 13 %    EOSINOPHILS 0 0 - 7 %    BASOPHILS 1 0 - 1 %    IMMATURE GRANULOCYTES 0 0 - 0.5 %    ABS. NEUTROPHILS 6.9 1.8 - 8.0 K/UL    ABS. LYMPHOCYTES 1.1 0.8 - 3.5 K/UL    ABS. MONOCYTES 0.8 0.0 - 1.0 K/UL    ABS. EOSINOPHILS 0.0 0.0 - 0.4 K/UL    ABS. BASOPHILS 0.1 0.0 - 0.1 K/UL    ABS. IMM.  GRANS. 0.0 0.00 - 0.04 K/UL    DF AUTOMATED     METABOLIC PANEL, COMPREHENSIVE    Collection Time: 11/24/22 12:06 PM   Result Value Ref Range    Sodium 131 (L) 136 - 145 mmol/L    Potassium Hemolyzed, recollect requested 3.5 - 5.1 mmol/L    Chloride 106 97 - 108 mmol/L    CO2 22 21 - 32 mmol/L    Anion gap 3 (L) 5 - 15 mmol/L    Glucose 102 (H) 65 - 100 mg/dL    BUN 26 (H) 6 - 20 mg/dL    Creatinine 1.39 (H) 0.55 - 1.02 mg/dL    BUN/Creatinine ratio 19 12 - 20      eGFR 36 (L) >60 ml/min/1.73m2    Calcium 8.4 (L) 8.5 - 10.1 mg/dL    Bilirubin, total 0.4 0.2 - 1.0 mg/dL    AST (SGOT) Hemolyzed, recollect requested 15 - 37 U/L    ALT (SGPT) PENDING U/L    Alk. phosphatase 50 45 - 117 U/L    Protein, total 6.6 6.4 - 8.2 g/dL    Albumin 2.3 (L) 3.5 - 5.0 g/dL    Globulin 4.3 (H) 2.0 - 4.0 g/dL    A-G Ratio 0.5 (L) 1.1 - 2.2     TROPONIN-HIGH SENSITIVITY    Collection Time: 11/24/22 12:06 PM   Result Value Ref Range    Troponin-High Sensitivity 16 0 - 51 ng/L   COVID-19 RAPID TEST    Collection Time: 11/24/22 12:06 PM   Result Value Ref Range    COVID-19 rapid test Not Detected Not Detected     INFLUENZA A & B AG (RAPID TEST)    Collection Time: 11/24/22 12:06 PM   Result Value Ref Range    Influenza A Antigen Positive (A) Negative      Influenza B Antigen Negative Negative     URINALYSIS W/ REFLEX CULTURE    Collection Time: 11/24/22 12:06 PM    Specimen: Urine   Result Value Ref Range    Color Yellow/Straw      Appearance Clear Clear      Specific gravity 1.016 1.003 - 1.030      pH (UA) 5.0 5.0 - 8.0      Protein 30 (A) Negative mg/dL    Glucose Negative Negative mg/dL    Ketone Negative Negative mg/dL    Bilirubin Negative Negative      Blood Negative Negative      Urobilinogen 0.1 0.1 - 1.0 EU/dL    Nitrites Negative Negative      Leukocyte Esterase Negative Negative      WBC PENDING /hpf    RBC PENDING /hpf    Epithelial cells PENDING /lpf    Bacteria PENDING /hpf    UA:UC IF INDICATED PENDING        Radiologic Studies  XR CHEST PORT   Final Result      No acute process on portable chest.           CT Results  (Last 48 hours)      None          CXR Results  (Last 48 hours)                 11/24/22 1215  XR CHEST PORT Final result    Impression:      No acute process on portable chest.           Narrative:  EXAM:  XR CHEST PORT       INDICATION: Cough. COMPARISON: Chest x-ray 11/2/2022. TECHNIQUE: Semierect portable chest AP view       FINDINGS: Cardiac monitor leads are noted. The cardiac silhouette is within   normal limits. The pulmonary vasculature is within normal limits. Atherosclerotic calcifications affect the aortic arch and the thoracic aorta is   tortuous. The lungs and pleural spaces are clear. The visualized bones and upper abdomen   are age-appropriate with note of note of osteopenia, degenerative spine change,   osteoarthritic changes of the shoulder, and L1 augmentation cement.                 ------------------------------------------------------------------------------------------------------------  The exam and treatment you received in the Emergency Department were for an urgent problem and are not intended as complete care. It is important that you follow-up with a doctor, nurse practitioner, or physician assistant to:  (1) confirm your diagnosis,  (2) re-evaluation of changes in your illness and treatment, and  (3) for ongoing care. Please take your discharge instructions with you when you go to your follow-up appointment. If you have any problem arranging a follow-up appointment, contact the Emergency Department. If your symptoms become worse or you do not improve as expected and you are unable to reach your health care provider, please return to the Emergency Department. We are available 24 hours a day. If a prescription has been provided, please have it filled as soon as possible to prevent a delay in treatment. If you have any questions or reservations about taking the medication due to side effects or interactions with other medications, please call your primary care provider or contact the ER.

## 2022-11-24 NOTE — ED TRIAGE NOTES
Son called ems for increasing fatigue and cough for a few days, pt c/o gen body aches.  Denies fever

## 2022-11-24 NOTE — ED PROVIDER NOTES
EMERGENCY DEPARTMENT HISTORY AND PHYSICAL EXAM      Date: 11/24/2022  Patient Name: Ned Arceo    History of Presenting Illness     Chief Complaint   Patient presents with    Cough    Fatigue       History Provided By: Patient    HPI: Ned Arceo, [address].o. female with a past medical history significant No significant past medical history presents to the ED with chief complaint of Cough and Fatigue  . 40-year-old female presents with URI symptoms. Fatigue. Generalized body aches. No low-grade fevers. No nausea vomiting diarrhea but a very poor appetite recently. Patient has a history of kidney disease. Patient with cough congestion. Very poor appetite not wanting to eat much. Had a small bite of toast today. There are no other complaints, changes, or physical findings at this time. PCP: Lata Galaviz MD    Current Facility-Administered Medications   Medication Dose Route Frequency Provider Last Rate Last Admin    oseltamivir (TAMIFLU) capsule 30 mg  30 mg Oral NOW Jerrell HERNANDEZ MD        cefTRIAXone (ROCEPHIN) 1 g in sterile water (preservative free) 10 mL IV syringe  1 g IntraVENous Ladonna HERNANDEZ MD        0.9% sodium chloride infusion  75 mL/hr IntraVENous ONCE Jono Emery MD         Current Outpatient Medications   Medication Sig Dispense Refill    oseltamivir (Tamiflu) 75 mg capsule Take 1 Capsule by mouth two (2) times a day for 5 days. 10 Capsule 0    ondansetron (ZOFRAN ODT) 4 mg disintegrating tablet Take 1 Tablet by mouth every eight (8) hours as needed for Nausea or Vomiting. 15 Tablet 0    fexofenadine (ALLEGRA) 180 mg tablet       lisinopriL (PRINIVIL, ZESTRIL) 20 mg tablet       Januvia 50 mg tablet       allopurinoL (ZYLOPRIM) 300 mg tablet Take 300 mg by mouth daily. melatonin 3 mg tablet Take 3 mg by mouth nightly. furosemide (LASIX) 40 mg tablet Take 40 mg by mouth daily.       cholecalciferol, vitamin D3, 50 mcg (2,000 unit) tab Take 1 Tablet by mouth daily. ferrous sulfate 325 mg (65 mg iron) tablet Take 325 mg by mouth. insulin glargine (LANTUS) 100 unit/mL injection by SubCUTAneous route At bedtime. potassium chloride (KAON 10%) 20 mEq/15 mL solution Take  by mouth daily. Past History     Past Medical History:  Past Medical History:   Diagnosis Date    Arthritis     CHF (congestive heart failure) ([facility] [address]     Chronic kidney disease     Diabetes ([facility] [address]     Heart failure (HCC)     Hypertension        Past Surgical History:  Past Surgical History:   Procedure Laterality Date    HX APPENDECTOMY      HX BILATERAL SALPINGO-OOPHORECTOMY  1971    HX HEENT      lump removal on head    HX HYSTERECTOMY      IR KYPHOPLASTY LUMBAR  5/4/2022       Family History:  Family History   Problem Relation Age of Onset    Hypertension Mother        Social History:  Social History     Tobacco Use    Smoking status: Never    Smokeless tobacco: Never   Vaping Use    Vaping Use: Never used   Substance Use Topics    Alcohol use: Never    Drug use: Never       Allergies:  No Known Allergies      Review of Systems   Review of Systems   Constitutional:  Positive for chills and fatigue. Negative for diaphoresis. HENT:  Positive for congestion and sore throat. Negative for ear pain and nosebleeds. Eyes: Negative. Negative for pain, discharge and redness. Respiratory:  Positive for cough and shortness of breath. Negative for chest tightness and wheezing. Cardiovascular: Negative. Negative for chest pain, palpitations and leg swelling. Gastrointestinal: Negative. Negative for abdominal pain, constipation, diarrhea, nausea and vomiting. Endocrine: Negative. Negative for cold intolerance. Genitourinary: Negative. Negative for difficulty urinating, dysuria and hematuria. Musculoskeletal: Negative. Negative for arthralgias, joint swelling and neck pain. Skin: Negative. Negative for color change, pallor, rash and wound. Allergic/Immunologic: Negative. Neurological: Negative. Negative for dizziness, syncope, weakness, light-headedness and headaches. Hematological: Negative. Does not bruise/bleed easily. Psychiatric/Behavioral: Negative. Negative for behavioral problems, confusion and suicidal ideas. All other systems reviewed and are negative. Physical Exam   Physical Exam  Vitals and nursing note reviewed. Exam conducted with a chaperone present. Constitutional:       Appearance: Normal appearance. She is normal weight. HENT:      Head: Normocephalic and atraumatic. Nose: Nose normal.      Mouth/Throat:      Mouth: Mucous membranes are moist.      Pharynx: Oropharynx is clear. Eyes:      Extraocular Movements: Extraocular movements intact. Conjunctiva/sclera: Conjunctivae normal.      Pupils: Pupils are equal, round, and reactive to light. Cardiovascular:      Rate and Rhythm: Regular rhythm. Bradycardia present. Pulses: Normal pulses. Heart sounds: Normal heart sounds. Pulmonary:      Effort: Pulmonary effort is normal. No respiratory distress. Breath sounds: Normal breath sounds. Abdominal:      General: Abdomen is flat. Bowel sounds are normal. There is no distension. Palpations: Abdomen is soft. Tenderness: There is no abdominal tenderness. There is no guarding. Musculoskeletal:         General: No swelling, tenderness, deformity or signs of injury. Normal range of motion. Cervical back: Normal range of motion and neck supple. Right lower leg: No edema. Left lower leg: No edema. Skin:     General: Skin is warm and dry. Capillary Refill: Capillary refill takes less than 2 seconds. Findings: No lesion or rash. Neurological:      General: No focal deficit present. Mental Status: She is alert and oriented to person, place, and time. Mental status is at baseline. Cranial Nerves: No cranial nerve deficit.    Psychiatric: Mood and Affect: Mood normal.         Behavior: Behavior normal.         Thought Content: Thought content normal.         Judgment: Judgment normal.       Diagnostic Study Results     Labs -     Recent Results (from the past 12 hour(s))   CBC WITH AUTOMATED DIFF    Collection Time: 11/24/22 12:06 PM   Result Value Ref Range    WBC 8.9 3.6 - 11.0 K/uL    RBC 4.23 3.80 - 5.20 M/uL    HGB 12.6 11.5 - 16.0 g/dL    HCT 39.2 35.0 - 47.0 %    MCV 92.7 80.0 - 99.0 FL    MCH 29.8 26.0 - 34.0 PG    MCHC 32.1 30.0 - 36.5 g/dL    RDW 15.1 (H) 11.5 - 14.5 %    PLATELET 734 345 - 884 K/uL    MPV 11.0 8.9 - 12.9 FL    NRBC 0.0 0.0  WBC    ABSOLUTE NRBC 0.00 0.00 - 0.01 K/uL    NEUTROPHILS 78 (H) 32 - 75 %    LYMPHOCYTES 12 12 - 49 %    MONOCYTES 9 5 - 13 %    EOSINOPHILS 0 0 - 7 %    BASOPHILS 1 0 - 1 %    IMMATURE GRANULOCYTES 0 0 - 0.5 %    ABS. NEUTROPHILS 6.9 1.8 - 8.0 K/UL    ABS. LYMPHOCYTES 1.1 0.8 - 3.5 K/UL    ABS. MONOCYTES 0.8 0.0 - 1.0 K/UL    ABS. EOSINOPHILS 0.0 0.0 - 0.4 K/UL    ABS. BASOPHILS 0.1 0.0 - 0.1 K/UL    ABS. IMM. GRANS. 0.0 0.00 - 0.04 K/UL    DF AUTOMATED     METABOLIC PANEL, COMPREHENSIVE    Collection Time: 11/24/22 12:06 PM   Result Value Ref Range    Sodium 131 (L) 136 - 145 mmol/L    Potassium Hemolyzed, recollect requested 3.5 - 5.1 mmol/L    Chloride 106 97 - 108 mmol/L    CO2 22 21 - 32 mmol/L    Anion gap 3 (L) 5 - 15 mmol/L    Glucose 102 (H) 65 - 100 mg/dL    BUN 26 (H) 6 - 20 mg/dL    Creatinine 1.39 (H) 0.55 - 1.02 mg/dL    BUN/Creatinine ratio 19 12 - 20      eGFR 36 (L) >60 ml/min/1.73m2    Calcium 8.4 (L) 8.5 - 10.1 mg/dL    Bilirubin, total 0.4 0.2 - 1.0 mg/dL    AST (SGOT) Hemolyzed, recollect requested 15 - 37 U/L    ALT (SGPT) PENDING U/L    Alk.  phosphatase 50 45 - 117 U/L    Protein, total 6.6 6.4 - 8.2 g/dL    Albumin 2.3 (L) 3.5 - 5.0 g/dL    Globulin 4.3 (H) 2.0 - 4.0 g/dL    A-G Ratio 0.5 (L) 1.1 - 2.2     TROPONIN-HIGH SENSITIVITY    Collection Time: 11/24/22 12:06 PM Result Value Ref Range    Troponin-High Sensitivity 16 0 - 51 ng/L   COVID-19 RAPID TEST    Collection Time: 11/24/22 12:06 PM   Result Value Ref Range    COVID-19 rapid test Not Detected Not Detected     INFLUENZA A & B AG (RAPID TEST)    Collection Time: 11/24/22 12:06 PM   Result Value Ref Range    Influenza A Antigen Positive (A) Negative      Influenza B Antigen Negative Negative     URINALYSIS W/ REFLEX CULTURE    Collection Time: 11/24/22 12:06 PM    Specimen: Urine   Result Value Ref Range    Color Yellow/Straw      Appearance Clear Clear      Specific gravity 1.016 1.003 - 1.030      pH (UA) 5.0 5.0 - 8.0      Protein 30 (A) Negative mg/dL    Glucose Negative Negative mg/dL    Ketone Negative Negative mg/dL    Bilirubin Negative Negative      Blood Negative Negative      Urobilinogen 0.1 0.1 - 1.0 EU/dL    Nitrites Negative Negative      Leukocyte Esterase Negative Negative      WBC 10-20 0 - 4 /hpf    RBC 0-5 0 - 5 /hpf    Bacteria Negative Negative /hpf    UA:UC IF INDICATED Urine Culture Ordered (A) Culture not indicated by UA result      Mucus Trace (A) Negative /lpf    Hyaline cast Hyaline 0 - 5 /lpf         Radiologic Studies -   XR CHEST PORT   Final Result      No acute process on portable chest.           CT Results  (Last 48 hours)      None          CXR Results  (Last 48 hours)                 11/24/22 1215  XR CHEST PORT Final result    Impression:      No acute process on portable chest.           Narrative:  EXAM:  XR CHEST PORT       INDICATION: Cough. COMPARISON: Chest x-ray 11/2/2022. TECHNIQUE: Semierect portable chest AP view       FINDINGS: Cardiac monitor leads are noted. The cardiac silhouette is within   normal limits. The pulmonary vasculature is within normal limits. Atherosclerotic calcifications affect the aortic arch and the thoracic aorta is   tortuous. The lungs and pleural spaces are clear.  The visualized bones and upper abdomen   are age-appropriate with note of note of osteopenia, degenerative spine change,   osteoarthritic changes of the shoulder, and L1 augmentation cement. Medical Decision Making and ED Course   I am the first provider for this patient. I reviewed the vital signs, available nursing notes, past medical history, past surgical history, family history and social history. Vital Signs-Reviewed the patient's vital signs. Patient Vitals for the past 12 hrs:   Temp Pulse Resp BP SpO2   11/24/22 1157 98.2 °F (36.8 °C) (!) 53 16 (!) 154/72 98 %       EKG interpretation:         Records Reviewed: Previous Hospital chart. EMS run report      ED Course:   Initial assessment performed. The patients presenting problems have been discussed, and they are in agreement with the care plan formulated and outlined with them. I have encouraged them to ask questions as they arise throughout their visit. Orders Placed This Encounter    COVID-19 RAPID TEST     Standing Status:   Standing     Number of Occurrences:   1     Order Specific Question:   Is this test for diagnosis or screening? Answer:   Diagnosis of ill patient     Order Specific Question:   Symptomatic for COVID-19 as defined by CDC? Answer:   Yes     Order Specific Question:   Date of Symptom Onset     Answer:   11/21/2022     Order Specific Question:   Hospitalized for COVID-19? Answer:   No     Order Specific Question:   Admitted to ICU for COVID-19? Answer:   No     Order Specific Question:   Employed in healthcare setting? Answer:   No     Order Specific Question:   Resident in a congregate (group) care setting? Answer:   No     Order Specific Question:   Pregnant? Answer:   No     Order Specific Question:   Previously tested for COVID-19?      Answer:   Yes    INFLUENZA A & B AG (RAPID TEST)     Standing Status:   Standing     Number of Occurrences:   1    CULTURE, URINE     Standing Status:   Standing     Number of Occurrences:   1 CULTURE, BLOOD, PAIRED     Standing Status:   Standing     Number of Occurrences:   1    CULTURE, BLOOD     Standing Status:   Standing     Number of Occurrences:   1    XR CHEST PORT     Standing Status:   Standing     Number of Occurrences:   1     Order Specific Question:   Reason for Exam     Answer:   cough    CBC WITH AUTOMATED DIFF     Standing Status:   Standing     Number of Occurrences:   1    METABOLIC PANEL, COMPREHENSIVE     Standing Status:   Standing     Number of Occurrences:   1    TROPONIN-HIGH SENSITIVITY     Standing Status:   Standing     Number of Occurrences:   1    URINALYSIS W/ REFLEX CULTURE     Standing Status:   Standing     Number of Occurrences:   1    LACTIC ACID, PLASMA     If lactic acid level is greater than 2, then a repeat lactic acid level is to be drawn in 6 hours. Standing Status:   Standing     Number of Occurrences:   1    LACTIC ACID, PLASMA     If initial lactic acid level is greater than 2, then a repeat lactic acid level is to be drawn in 6 hours. Standing Status:   Standing     Number of Occurrences:   76250    EKG, 12 LEAD, INITIAL     Standing Status:   Standing     Number of Occurrences:   1     Order Specific Question:   Reason for Exam:     Answer:   fatgue    sodium chloride 0.9 % bolus infusion 500 mL    oseltamivir (Tamiflu) 75 mg capsule     Sig: Take 1 Capsule by mouth two (2) times a day for 5 days. Dispense:  10 Capsule     Refill:  0    ondansetron (ZOFRAN ODT) 4 mg disintegrating tablet     Sig: Take 1 Tablet by mouth every eight (8) hours as needed for Nausea or Vomiting.      Dispense:  15 Tablet     Refill:  0    DISCONTD: oseltamivir (TAMIFLU) capsule 30 mg     Order Specific Question:   Tamiflu Indication     Answer:   Influenza     Order Specific Question:   Tamiflu treatment (duration of therapy)     Answer:   5 days    oseltamivir (TAMIFLU) capsule 30 mg     Order Specific Question:   Tamiflu Indication     Answer:   Influenza     Order Specific Question:   Tamiflu treatment (duration of therapy)     Answer:   5 days    cefTRIAXone (ROCEPHIN) 1 g in sterile water (preservative free) 10 mL IV syringe     Order Specific Question:   Antibiotic Indications     Answer:   Urinary Tract Infection    0.9% sodium chloride infusion                 Provider Notes (Medical Decision Making):   25-year-old presents with URI cough congestion fatigue secondary to influenza A as well as a UTI. Patient attempted to tolerate p.o. here. Only was able to take small sips. Will admit for IV hydration IV antibiotics. ED Course as of 11/24/22 1439   Thu Nov 24, 2022   1312 Influenza A Antigen(!): Positive [HP]   1312 COVID-19 rapid test: Not Detected [HP]   1327 Creatinine(!): 1.39 [HP]      ED Course User Index  [HP] Mike Watters MD       Consults    [facility] AT [facility] admit              Procedures               Disposition       Emergency Department Disposition:  admit      Diagnosis     Clinical Impression:   1. Influenza A    2. Urinary tract infection without hematuria, site unspecified        Attestations:    Chanell Jacobo MD    Please note that this dictation was completed with [employer], the computer voice recognition software. Quite often unanticipated grammatical, syntax, homophones, and other interpretive errors are inadvertently transcribed by the computer software. Please disregard these errors. Please excuse any errors that have escaped final proofreading. Thank you.

## 2022-11-24 NOTE — H&P
History & Physical    Primary Care Provider: Venkat Reyez MD  Source of Information: Patient self/kids    History of Presenting Illness:   Janes Dawson is a 80 y.o. female with history of diabetes and congestive heart failure presenting to the ED with complaints of intermittent nonproductive cough for the last 2 days. Notes poor appetite and decreased oral intake. No reported fevers but intermittent chills early this morning. Chest x-ray negative for consolidation. Influenza A+. Blood cultures obtained and patient empirically started on Rocephin and oral Tamiflu. Will be admitted for gentle IV fluid hydration and antibiotics. Review of Systems:  A comprehensive review of systems was negative except for that written in the History of Present Illness. Past Medical History:   Diagnosis Date    Arthritis     CHF (congestive heart failure) (HCC)     Chronic kidney disease     Diabetes ([facility] [address]     Heart failure (HCC)     Hypertension       Past Surgical History:   Procedure Laterality Date    HX APPENDECTOMY      HX BILATERAL SALPINGO-OOPHORECTOMY  1971    HX HEENT      lump removal on head    HX HYSTERECTOMY      IR KYPHOPLASTY LUMBAR  5/4/2022     Prior to Admission medications    Medication Sig Start Date End Date Taking? Authorizing Provider   oseltamivir (Tamiflu) 75 mg capsule Take 1 Capsule by mouth two (2) times a day for 5 days. 11/24/22 11/29/22 Yes Mike Watters MD   ondansetron (ZOFRAN ODT) 4 mg disintegrating tablet Take 1 Tablet by mouth every eight (8) hours as needed for Nausea or Vomiting. 11/24/22  Yes Mike Watters MD   fexofenadine [facility]) 180 mg tablet  3/9/22   Provider, Historical   lisinopriL (PRINIVIL, ZESTRIL) 20 mg tablet  3/9/22   Provider, Historical   Januvia 50 mg tablet  3/9/22   Provider, Historical   allopurinoL (ZYLOPRIM) 300 mg tablet Take 300 mg by mouth daily.     Provider, Historical   melatonin 3 mg tablet Take 3 mg by mouth nightly. Provider, Historical   furosemide (LASIX) 40 mg tablet Take 40 mg by mouth daily. Provider, Historical   cholecalciferol, vitamin D3, 50 mcg (2,000 unit) tab Take 1 Tablet by mouth daily. Provider, Historical   ferrous sulfate 325 mg (65 mg iron) tablet Take 325 mg by mouth. Other, MD Juan   insulin glargine (LANTUS) 100 unit/mL injection by SubCUTAneous route At bedtime. Juan Christian MD   potassium chloride (KAON 10%) 20 mEq/15 mL solution Take  by mouth daily. Other, MD Juan     No Known Allergies   Family History   Problem Relation Age of Onset    Hypertension Mother         SOCIAL HISTORY:  Patient resides:  Independently    Assisted Living    SNF    With family care x      Smoking history:   None x   Former    Chronic      Alcohol history:   None x   Social    Chronic      Ambulates:   Independently    w/cane x   w/walker    w/wc    CODE STATUS:  DNR x   Full    Other      Objective:     Physical Exam:     Visit Vitals  BP (!) 154/72   Pulse (!) 53   Temp 98.2 °F (36.8 °C)   Resp 16   Ht 5' (1.524 m)   Wt 56.2 kg (124 lb)   SpO2 98%   BMI 24.22 kg/m²      O2 Device: None (Room air)    General:  Alert, cooperative, no distress, appears stated age. Head:  Normocephalic, without obvious abnormality, atraumatic. Eyes:  Conjunctivae/corneas clear. PERRL, EOMs intact. Nose: Nares normal. Septum midline. Mucosa normal. No drainage or sinus tenderness. Throat: Lips, mucosa, and tongue normal. Teeth and gums normal.   Neck: Supple, symmetrical, trachea midline, no adenopathy, thyroid: no enlargement/tenderness/nodules, no carotid bruit and no JVD. Back:   Symmetric, no curvature. ROM normal. No CVA tenderness. Lungs:   Clear to auscultation bilaterally. Chest wall:  No tenderness or deformity. Heart:  Regular rate and rhythm, S1, S2 normal, no murmur, click, rub or gallop. Abdomen:   Soft, non-tender. Bowel sounds normal. No masses,  No organomegaly.    Extremities: Extremities normal, atraumatic, no cyanosis or edema. Pulses: 2+ and symmetric all extremities. Skin: Skin color, texture, turgor normal. No rashes or lesions   Neurologic: CNII-XII intact. No motor or sensory deficits. EKG:  nonspecific ST and T waves changes. Data Review:     Recent Days:  Recent Labs     11/24/22  1206   WBC 8.9   HGB 12.6   HCT 39.2        Recent Labs     11/24/22  1206   *   K Hemolyzed, recollect requested      CO2 22   *   BUN 26*   CREA 1.39*   CA 8.4*   ALB 2.3*   TBILI 0.4   ALT 14     No results for input(s): PH, PCO2, PO2, HCO3, FIO2 in the last 72 hours. 24 Hour Results:  Recent Results (from the past 24 hour(s))   CBC WITH AUTOMATED DIFF    Collection Time: 11/24/22 12:06 PM   Result Value Ref Range    WBC 8.9 3.6 - 11.0 K/uL    RBC 4.23 3.80 - 5.20 M/uL    HGB 12.6 11.5 - 16.0 g/dL    HCT 39.2 35.0 - 47.0 %    MCV 92.7 80.0 - 99.0 FL    MCH 29.8 26.0 - 34.0 PG    MCHC 32.1 30.0 - 36.5 g/dL    RDW 15.1 (H) 11.5 - 14.5 %    PLATELET 119 454 - 203 K/uL    MPV 11.0 8.9 - 12.9 FL    NRBC 0.0 0.0  WBC    ABSOLUTE NRBC 0.00 0.00 - 0.01 K/uL    NEUTROPHILS 78 (H) 32 - 75 %    LYMPHOCYTES 12 12 - 49 %    MONOCYTES 9 5 - 13 %    EOSINOPHILS 0 0 - 7 %    BASOPHILS 1 0 - 1 %    IMMATURE GRANULOCYTES 0 0 - 0.5 %    ABS. NEUTROPHILS 6.9 1.8 - 8.0 K/UL    ABS. LYMPHOCYTES 1.1 0.8 - 3.5 K/UL    ABS. MONOCYTES 0.8 0.0 - 1.0 K/UL    ABS. EOSINOPHILS 0.0 0.0 - 0.4 K/UL    ABS. BASOPHILS 0.1 0.0 - 0.1 K/UL    ABS. IMM.  GRANS. 0.0 0.00 - 0.04 K/UL    DF AUTOMATED     METABOLIC PANEL, COMPREHENSIVE    Collection Time: 11/24/22 12:06 PM   Result Value Ref Range    Sodium 131 (L) 136 - 145 mmol/L    Potassium Hemolyzed, recollect requested 3.5 - 5.1 mmol/L    Chloride 106 97 - 108 mmol/L    CO2 22 21 - 32 mmol/L    Anion gap 3 (L) 5 - 15 mmol/L    Glucose 102 (H) 65 - 100 mg/dL    BUN 26 (H) 6 - 20 mg/dL    Creatinine 1.39 (H) 0.55 - 1.02 mg/dL BUN/Creatinine ratio 19 12 - 20      eGFR 36 (L) >60 ml/min/1.73m2    Calcium 8.4 (L) 8.5 - 10.1 mg/dL    Bilirubin, total 0.4 0.2 - 1.0 mg/dL    AST (SGOT) Hemolyzed, recollect requested 15 - 37 U/L    ALT (SGPT) 14 12 - 78 U/L    Alk.  phosphatase 50 45 - 117 U/L    Protein, total 6.6 6.4 - 8.2 g/dL    Albumin 2.3 (L) 3.5 - 5.0 g/dL    Globulin 4.3 (H) 2.0 - 4.0 g/dL    A-G Ratio 0.5 (L) 1.1 - 2.2     TROPONIN-HIGH SENSITIVITY    Collection Time: 11/24/22 12:06 PM   Result Value Ref Range    Troponin-High Sensitivity 16 0 - 51 ng/L   COVID-19 RAPID TEST    Collection Time: 11/24/22 12:06 PM   Result Value Ref Range    COVID-19 rapid test Not Detected Not Detected     INFLUENZA A & B AG (RAPID TEST)    Collection Time: 11/24/22 12:06 PM   Result Value Ref Range    Influenza A Antigen Positive (A) Negative      Influenza B Antigen Negative Negative     URINALYSIS W/ REFLEX CULTURE    Collection Time: 11/24/22 12:06 PM    Specimen: Urine   Result Value Ref Range    Color Yellow/Straw      Appearance Clear Clear      Specific gravity 1.016 1.003 - 1.030      pH (UA) 5.0 5.0 - 8.0      Protein 30 (A) Negative mg/dL    Glucose Negative Negative mg/dL    Ketone Negative Negative mg/dL    Bilirubin Negative Negative      Blood Negative Negative      Urobilinogen 0.1 0.1 - 1.0 EU/dL    Nitrites Negative Negative      Leukocyte Esterase Negative Negative      WBC 10-20 0 - 4 /hpf    RBC 0-5 0 - 5 /hpf    Bacteria Negative Negative /hpf    UA:UC IF INDICATED Urine Culture Ordered (A) Culture not indicated by UA result      Mucus Trace (A) Negative /lpf    Hyaline cast Hyaline 0 - 5 /lpf         Imaging:   XR CHEST PORT   Final Result      No acute process on portable chest.               Assessment:     Acute influenza A    -Symptomatic management    -Tamiflu 75 mg oral twice daily x5 days    -Empiric IV Rocephin 1 g every 24 hours to cover super infection with bacteria    -Encourage oral fluid intake    Type 2 diabetes -Fairly stable    -Hold metformin due to acute kidney injury    -Blood sugar ACH S/sliding scale insulin    Acute kidney injury    -Secondary to dehydration from poor oral intake    -Gentle IV fluid hydration    Generalized weakness    -Secondary to poor oral intake and acute infection    -PT OT evaluation    Plan:     Admit to medical telemetry floor inpatient  Treatment plan as outlined above  Care plan discussed with patient and multiple kids  Anticipate greater than 48 hours of in-hospital stay for treatment of acute influenza  CODE STATUS discussed.   She requests a DNR/DNI      Signed By: Demetria Slade MD     November 24, 2022

## 2022-11-24 NOTE — PROGRESS NOTES
Primary Nurse Ronni Ramirez LPN and Alma Madden, RN performed a dual skin assessment on this patient No impairment noted  Chris score is 14

## 2022-11-25 LAB
ANION GAP SERPL CALC-SCNC: 10 MMOL/L (ref 5–15)
ATRIAL RATE: 53 BPM
BUN SERPL-MCNC: 24 MG/DL (ref 6–20)
BUN/CREAT SERPL: 19 (ref 12–20)
CA-I BLD-MCNC: 8.6 MG/DL (ref 8.5–10.1)
CALCULATED P AXIS, ECG09: 56 DEGREES
CALCULATED R AXIS, ECG10: -38 DEGREES
CALCULATED T AXIS, ECG11: 10 DEGREES
CHLORIDE SERPL-SCNC: 107 MMOL/L (ref 97–108)
CO2 SERPL-SCNC: 21 MMOL/L (ref 21–32)
CREAT SERPL-MCNC: 1.25 MG/DL (ref 0.55–1.02)
DIAGNOSIS, 93000: NORMAL
ERYTHROCYTE [DISTWIDTH] IN BLOOD BY AUTOMATED COUNT: 14.5 % (ref 11.5–14.5)
GLUCOSE BLD STRIP.AUTO-MCNC: 84 MG/DL (ref 65–100)
GLUCOSE BLD STRIP.AUTO-MCNC: 84 MG/DL (ref 65–100)
GLUCOSE BLD STRIP.AUTO-MCNC: 98 MG/DL (ref 65–100)
GLUCOSE BLD STRIP.AUTO-MCNC: 99 MG/DL (ref 65–100)
GLUCOSE SERPL-MCNC: 82 MG/DL (ref 65–100)
HCT VFR BLD AUTO: 35.9 % (ref 35–47)
HGB BLD-MCNC: 11.5 G/DL (ref 11.5–16)
MCH RBC QN AUTO: 29.3 PG (ref 26–34)
MCHC RBC AUTO-ENTMCNC: 32 G/DL (ref 30–36.5)
MCV RBC AUTO: 91.6 FL (ref 80–99)
NRBC # BLD: 0 K/UL (ref 0–0.01)
NRBC BLD-RTO: 0 PER 100 WBC
P-R INTERVAL, ECG05: 150 MS
PERFORMED BY, TECHID: NORMAL
PLATELET # BLD AUTO: 180 K/UL (ref 150–400)
PMV BLD AUTO: 10.3 FL (ref 8.9–12.9)
POTASSIUM SERPL-SCNC: 3.6 MMOL/L (ref 3.5–5.1)
Q-T INTERVAL, ECG07: 508 MS
QRS DURATION, ECG06: 120 MS
QTC CALCULATION (BEZET), ECG08: 476 MS
RBC # BLD AUTO: 3.92 M/UL (ref 3.8–5.2)
SODIUM SERPL-SCNC: 138 MMOL/L (ref 136–145)
VENTRICULAR RATE, ECG03: 53 BPM
WBC # BLD AUTO: 5.7 K/UL (ref 3.6–11)

## 2022-11-25 PROCEDURE — 74011000250 HC RX REV CODE- 250: Performed by: INTERNAL MEDICINE

## 2022-11-25 PROCEDURE — 36415 COLL VENOUS BLD VENIPUNCTURE: CPT

## 2022-11-25 PROCEDURE — 82962 GLUCOSE BLOOD TEST: CPT

## 2022-11-25 PROCEDURE — 74011250636 HC RX REV CODE- 250/636: Performed by: INTERNAL MEDICINE

## 2022-11-25 PROCEDURE — 85027 COMPLETE CBC AUTOMATED: CPT

## 2022-11-25 PROCEDURE — 74011250637 HC RX REV CODE- 250/637: Performed by: INTERNAL MEDICINE

## 2022-11-25 PROCEDURE — 80048 BASIC METABOLIC PNL TOTAL CA: CPT

## 2022-11-25 PROCEDURE — 65270000029 HC RM PRIVATE

## 2022-11-25 RX ADMIN — Medication 2000 UNITS: at 10:02

## 2022-11-25 RX ADMIN — ALLOPURINOL 300 MG: 100 TABLET ORAL at 10:02

## 2022-11-25 RX ADMIN — HYDRALAZINE HYDROCHLORIDE 10 MG: 20 INJECTION, SOLUTION INTRAMUSCULAR; INTRAVENOUS at 18:30

## 2022-11-25 RX ADMIN — ALOGLIPTIN 6.25 MG: 6.25 TABLET, FILM COATED ORAL at 10:02

## 2022-11-25 RX ADMIN — CEFTRIAXONE SODIUM 1 G: 1 INJECTION, POWDER, FOR SOLUTION INTRAMUSCULAR; INTRAVENOUS at 16:09

## 2022-11-25 RX ADMIN — SODIUM CHLORIDE, PRESERVATIVE FREE 10 ML: 5 INJECTION INTRAVENOUS at 06:08

## 2022-11-25 RX ADMIN — ENOXAPARIN SODIUM 30 MG: 100 INJECTION SUBCUTANEOUS at 10:02

## 2022-11-25 RX ADMIN — HYDRALAZINE HYDROCHLORIDE 10 MG: 20 INJECTION, SOLUTION INTRAMUSCULAR; INTRAVENOUS at 04:31

## 2022-11-25 RX ADMIN — OSELTAMIVIR PHOSPHATE 75 MG: 75 CAPSULE ORAL at 10:02

## 2022-11-25 RX ADMIN — MELATONIN TAB 3 MG 3 MG: 3 TAB at 21:18

## 2022-11-25 RX ADMIN — SODIUM CHLORIDE, PRESERVATIVE FREE 10 ML: 5 INJECTION INTRAVENOUS at 21:19

## 2022-11-25 RX ADMIN — OSELTAMIVIR PHOSPHATE 75 MG: 75 CAPSULE ORAL at 21:26

## 2022-11-25 RX ADMIN — FERROUS SULFATE TAB 325 MG (65 MG ELEMENTAL FE) 325 MG: 325 (65 FE) TAB at 10:02

## 2022-11-25 RX ADMIN — SODIUM CHLORIDE, PRESERVATIVE FREE 10 ML: 5 INJECTION INTRAVENOUS at 13:43

## 2022-11-25 NOTE — PROGRESS NOTES
Problem: Gas Exchange - Impaired  Goal: *Absence of hypoxia  Outcome: Progressing Towards Goal  Note: Patient is on room air and her O2 saturations are remaining above 90%       Bedside shift change report given to Makeda Ramires RN (oncoming nurse) by Bernie Huber RN (offgoing nurse). Report included the following information SBAR, Kardex, ED Summary, Intake/Output, MAR, Accordion, Med Rec Status, and Cardiac Rhythm NSR/Sinus Lis Hicks .

## 2022-11-25 NOTE — PROGRESS NOTES
Hospitalist Progress Note         LJ Askew, FNP-C    Daily Progress Note: 11/25/2022      Subjective:   Subjective   Patient examined quietly resting in bed, easily arousable. No acute distress noted on examination. Reports she's extremely weak and has poor appetite. Endorses no sob, fever, chills. Review of Systems:   Review of Systems   Constitutional:  Positive for malaise/fatigue. Eyes:  Negative for blurred vision and double vision. Respiratory:  Positive for cough. Negative for shortness of breath. Cardiovascular:  Negative for chest pain. Gastrointestinal:  Negative for abdominal pain, nausea and vomiting. Genitourinary:  Negative for dysuria. Musculoskeletal:  Negative for myalgias. Neurological:  Positive for weakness. Objective:   Objective      Vitals:  Patient Vitals for the past 12 hrs:   Temp Pulse Resp BP SpO2   11/25/22 1000 98.7 °F (37.1 °C) 91 16 (!) 156/56 96 %   11/25/22 0517 -- (!) 59 -- -- --   11/25/22 0427 98.2 °F (36.8 °C) 66 16 (!) 161/66 92 %        Physical Exam:  Physical Exam  Vitals and nursing note reviewed. Constitutional:       Appearance: She is ill-appearing. Cardiovascular:      Rate and Rhythm: Normal rate. Pulmonary:      Breath sounds: Normal breath sounds. Abdominal:      General: Bowel sounds are normal.   Skin:     General: Skin is warm and dry. Neurological:      Mental Status: She is alert and oriented to person, place, and time.         Lab Results:  Recent Results (from the past 24 hour(s))   EKG, 12 LEAD, INITIAL    Collection Time: 11/24/22 11:53 AM   Result Value Ref Range    Ventricular Rate 53 BPM    Atrial Rate 53 BPM    P-R Interval 150 ms    QRS Duration 120 ms    Q-T Interval 508 ms    QTC Calculation (Bezet) 476 ms    Calculated P Axis 56 degrees    Calculated R Axis -38 degrees    Calculated T Axis 10 degrees    Diagnosis       Sinus bradycardia  Possible Left atrial enlargement  Left axis deviation  Right bundle branch block  Abnormal ECG  When compared with ECG of 02-NOV-2022 10:38,  Premature atrial complexes are no longer Present  Confirmed by Lona Samano ([id number]) on 11/25/2022 8:11:05 AM     CBC WITH AUTOMATED DIFF    Collection Time: 11/24/22 12:06 PM   Result Value Ref Range    WBC 8.9 3.6 - 11.0 K/uL    RBC 4.23 3.80 - 5.20 M/uL    HGB 12.6 11.5 - 16.0 g/dL    HCT 39.2 35.0 - 47.0 %    MCV 92.7 80.0 - 99.0 FL    MCH 29.8 26.0 - 34.0 PG    MCHC 32.1 30.0 - 36.5 g/dL    RDW 15.1 (H) 11.5 - 14.5 %    PLATELET 325 267 - 599 K/uL    MPV 11.0 8.9 - 12.9 FL    NRBC 0.0 0.0  WBC    ABSOLUTE NRBC 0.00 0.00 - 0.01 K/uL    NEUTROPHILS 78 (H) 32 - 75 %    LYMPHOCYTES 12 12 - 49 %    MONOCYTES 9 5 - 13 %    EOSINOPHILS 0 0 - 7 %    BASOPHILS 1 0 - 1 %    IMMATURE GRANULOCYTES 0 0 - 0.5 %    ABS. NEUTROPHILS 6.9 1.8 - 8.0 K/UL    ABS. LYMPHOCYTES 1.1 0.8 - 3.5 K/UL    ABS. MONOCYTES 0.8 0.0 - 1.0 K/UL    ABS. EOSINOPHILS 0.0 0.0 - 0.4 K/UL    ABS. BASOPHILS 0.1 0.0 - 0.1 K/UL    ABS. IMM. GRANS. 0.0 0.00 - 0.04 K/UL    DF AUTOMATED     METABOLIC PANEL, COMPREHENSIVE    Collection Time: 11/24/22 12:06 PM   Result Value Ref Range    Sodium 131 (L) 136 - 145 mmol/L    Potassium Hemolyzed, recollect requested 3.5 - 5.1 mmol/L    Chloride 106 97 - 108 mmol/L    CO2 22 21 - 32 mmol/L    Anion gap 3 (L) 5 - 15 mmol/L    Glucose 102 (H) 65 - 100 mg/dL    BUN 26 (H) 6 - 20 mg/dL    Creatinine 1.39 (H) 0.55 - 1.02 mg/dL    BUN/Creatinine ratio 19 12 - 20      eGFR 36 (L) >60 ml/min/1.73m2    Calcium 8.4 (L) 8.5 - 10.1 mg/dL    Bilirubin, total 0.4 0.2 - 1.0 mg/dL    AST (SGOT) Hemolyzed, recollect requested 15 - 37 U/L    ALT (SGPT) 14 12 - 78 U/L    Alk.  phosphatase 50 45 - 117 U/L    Protein, total 6.6 6.4 - 8.2 g/dL    Albumin 2.3 (L) 3.5 - 5.0 g/dL    Globulin 4.3 (H) 2.0 - 4.0 g/dL    A-G Ratio 0.5 (L) 1.1 - 2.2     TROPONIN-HIGH SENSITIVITY    Collection Time: 11/24/22 12:06 PM   Result Value Ref Range Troponin-High Sensitivity 16 0 - 51 ng/L   COVID-19 RAPID TEST    Collection Time: 11/24/22 12:06 PM   Result Value Ref Range    COVID-19 rapid test Not Detected Not Detected     INFLUENZA A & B AG (RAPID TEST)    Collection Time: 11/24/22 12:06 PM   Result Value Ref Range    Influenza A Antigen Positive (A) Negative      Influenza B Antigen Negative Negative     URINALYSIS W/ REFLEX CULTURE    Collection Time: 11/24/22 12:06 PM    Specimen: Urine   Result Value Ref Range    Color Yellow/Straw      Appearance Clear Clear      Specific gravity 1.016 1.003 - 1.030      pH (UA) 5.0 5.0 - 8.0      Protein 30 (A) Negative mg/dL    Glucose Negative Negative mg/dL    Ketone Negative Negative mg/dL    Bilirubin Negative Negative      Blood Negative Negative      Urobilinogen 0.1 0.1 - 1.0 EU/dL    Nitrites Negative Negative      Leukocyte Esterase Negative Negative      WBC 10-20 0 - 4 /hpf    RBC 0-5 0 - 5 /hpf    Bacteria Negative Negative /hpf    UA:UC IF INDICATED Urine Culture Ordered (A) Culture not indicated by UA result      Mucus Trace (A) Negative /lpf    Hyaline cast Hyaline 0 - 5 /lpf   CULTURE, BLOOD, PAIRED    Collection Time: 11/24/22  3:13 PM    Specimen: Blood   Result Value Ref Range    Special Requests: No Special Requests      Culture result: No growth after 16 hours     LACTIC ACID    Collection Time: 11/24/22  3:13 PM   Result Value Ref Range    Lactic acid 1.2 0.4 - 2.0 mmol/L   GLUCOSE, POC    Collection Time: 11/24/22  5:00 PM   Result Value Ref Range    Glucose (POC) 111 (H) 65 - 100 mg/dL    Performed by Monalisa Regan    GLUCOSE, POC    Collection Time: 11/24/22  9:17 PM   Result Value Ref Range    Glucose (POC) 100 65 - 100 mg/dL    Performed by Yobani Esparza    METABOLIC PANEL, BASIC    Collection Time: 11/25/22  6:54 AM   Result Value Ref Range    Sodium 138 136 - 145 mmol/L    Potassium 3.6 3.5 - 5.1 mmol/L    Chloride 107 97 - 108 mmol/L    CO2 21 21 - 32 mmol/L    Anion gap 10 5 - 15 mmol/L    Glucose 82 65 - 100 mg/dL    BUN 24 (H) 6 - 20 mg/dL    Creatinine 1.25 (H) 0.55 - 1.02 mg/dL    BUN/Creatinine ratio 19 12 - 20      eGFR 41 (L) >60 ml/min/1.73m2    Calcium 8.6 8.5 - 10.1 mg/dL   CBC W/O DIFF    Collection Time: 11/25/22  6:54 AM   Result Value Ref Range    WBC 5.7 3.6 - 11.0 K/uL    RBC 3.92 3.80 - 5.20 M/uL    HGB 11.5 11.5 - 16.0 g/dL    HCT 35.9 35.0 - 47.0 %    MCV 91.6 80.0 - 99.0 FL    MCH 29.3 26.0 - 34.0 PG    MCHC 32.0 30.0 - 36.5 g/dL    RDW 14.5 11.5 - 14.5 %    PLATELET 455 282 - 284 K/uL    MPV 10.3 8.9 - 12.9 FL    NRBC 0.0 0.0  WBC    ABSOLUTE NRBC 0.00 0.00 - 0.01 K/uL   GLUCOSE, POC    Collection Time: 11/25/22  7:15 AM   Result Value Ref Range    Glucose (POC) 84 65 - 100 mg/dL    Performed by Sinan Hernandez       Results       Procedure Component Value Units Date/Time    CULTURE, BLOOD, PAIRED [[id number]] Collected: 11/24/22 1513    Order Status: Completed Specimen: Blood Updated: 11/25/22 0809     Special Requests: No Special Requests        Culture result: No growth after 16 hours       CULTURE, BLOOD [[id number]] Collected: 11/24/22 1445    Order Status: Canceled Specimen: Blood     COVID-19 RAPID TEST [[id number]] Collected: 11/24/22 1206    Order Status: Completed Specimen: Nasopharyngeal Updated: 11/24/22 1253     COVID-19 rapid test Not Detected        Comment: Rapid Abbott ID Now   The specimen is NEGATIVE for SARS-CoV2, the novel coronavirus associated with COVID-19. A negative result does not rule out COVID-19. This test has been authorized by the FDA under an Emergency Use Authorization (EUA) for use by authorized laboratories.    Fact sheet for Healthcare Providers:  http://www.irene.amena/ Fact sheet for Patients: http://www.irene.amena/   Methodology: Isothermal Nucleic Acid Amplification       INFLUENZA A & B AG (RAPID TEST) [[id number]]  (Abnormal) Collected: 11/24/22 1206    Order Status: Completed Specimen: Nasopharyngeal from Nasal washing Updated: 11/24/22 1256     Influenza A Antigen Positive        Comment: CALLED TO AND READ BACK BY CRISTHIAN HERNANDEZ RN AT 2100 BY MO 11/24/2022        Influenza B Antigen Negative       CULTURE, URINE [[id number]] Collected: 11/24/22 1206    Order Status: No result Specimen: Urine Updated: 11/24/22 1352             Diagnostic Images:  CT Results  (Last 48 hours)      None             XR CHEST PORT   Final Result      No acute process on portable chest.                   Current Medications:    Current Facility-Administered Medications:     sodium chloride (NS) flush 5-40 mL, 5-40 mL, IntraVENous, Q8H, Jaden Ivey MD, 10 mL at 11/25/22 6358    sodium chloride (NS) flush 5-40 mL, 5-40 mL, IntraVENous, PRN, Jaden Ivey MD    acetaminophen (TYLENOL) tablet 650 mg, 650 mg, Oral, Q6H PRN **OR** acetaminophen (TYLENOL) suppository 650 mg, 650 mg, Rectal, Q6H PRN, Jaden Ivey MD    polyethylene glycol (MIRALAX) packet 17 g, 17 g, Oral, DAILY PRN, Jaden Ivey MD    ondansetron (ZOFRAN ODT) tablet 4 mg, 4 mg, Oral, Q8H PRN **OR** ondansetron (ZOFRAN) injection 4 mg, 4 mg, IntraVENous, Q6H PRN, Jaden Ivey MD    enoxaparin (LOVENOX) injection 30 mg, 30 mg, SubCUTAneous, DAILY, Jaden Ivey MD, 30 mg at 11/25/22 1002    oseltamivir (TAMIFLU) capsule 75 mg, 75 mg, Oral, Q12H, Jaden Ivey MD, 75 mg at 11/25/22 1002    allopurinoL (ZYLOPRIM) tablet 300 mg, 300 mg, Oral, DAILY, Jaden Ivey MD, 300 mg at 11/25/22 1002    cholecalciferol (VITAMIN D3) (1000 Units /25 mcg) tablet 2,000 Units, 2,000 Units, Oral, DAILY, Jaden Ivey MD, 2,000 Units at 11/25/22 1002    ferrous sulfate tablet 325 mg, 325 mg, Oral, DAILY WITH BREAKFAST, Jaden Ivey MD, 325 mg at 11/25/22 1002    alogliptin (NESINA) tablet 6.25 mg, 6.25 mg, Oral, DAILY, Jaden Ivey MD, 6.25 mg at 11/25/22 1002    melatonin tablet 3 mg, 3 mg, Oral, QHS, Jaden Ivey, MD, 3 mg at 11/24/22 2122    insulin lispro (HUMALOG) injection, , SubCUTAneous, AC&HS, Ene Dejesus MD    hydrALAZINE (APRESOLINE) 20 mg/mL injection 10 mg, 10 mg, IntraVENous, Q6H PRN, Ene Dejesus MD, 10 mg at 11/25/22 0431    cefTRIAXone (ROCEPHIN) 1 g in sterile water (preservative free) 10 mL IV syringe, 1 g, IntraVENous, Q24H, Ene Dejesus MD       ASSESSMENT:  Marlon Hummel is a 80 y.o. female with history of diabetes and congestive heart failure presenting to the ED with complaints of intermittent nonproductive cough for the last 2 days. Notes poor appetite and decreased oral intake. No reported fevers but intermittent chills early this morning. Chest x-ray negative for consolidation. Influenza A+. Blood cultures obtained and patient empirically started on Rocephin and oral Tamiflu. Will be admitted for gentle IV fluid hydration and antibiotics. Acute influenza A    -Symptomatic management    -Tamiflu 75 mg oral twice daily x5 days    -Empiric IV Rocephin 1 g every 24 hours to cover super infection with bacteria    -Encourage oral fluid intake     Type 2 diabetes    -Fairly stable    -Hold metformin due to acute kidney injury    -Blood sugar ACH S/sliding scale insulin     Acute kidney injury    -Secondary to dehydration from poor oral intake    -Gentle IV fluid hydration     Generalized weakness    -Secondary to poor oral intake and acute infection    -PT OT evaluation      DNR  Dvt Prophylaxis: Lovenox  GI Prophylaxis: None      Above treatment plan reviewed and discussed with patient in detail at bedside, all questions answered. Care Plan discussed with: Patient/Family    Total time spent with patient: 30 minutes.     Ivy Robles NP

## 2022-11-25 NOTE — PROGRESS NOTES
Reason for Admission:   Influenza A                    RUR Score:     16%             PCP: First and Last name:   Fazal Cervantes MD     Name of Practice:    Are you a current patient: Yes/No: yes   Approximate date of last visit: a month ago   Can you participate in a virtual visit if needed: no    Do you (patient/family) have any concerns for transition/discharge? None reported              Plan for utilizing home health:   patient current with At [facility]    Current Advanced Directive/Advance Care Plan:  DNR      Healthcare Decision Maker:  Daughter, Kayli Titus, [phone]  Click here to [address] including selection of the Healthcare Decision Maker Relationship (ie \"Primary\")              Transition of Care Plan:      return home with resumption of hospice care with At [facility] spoke with patient's daughter, Patti Hill [phone], to complete DCP assessment. Patient currently living with her son and daughter-in-law at [address] [place] [address] in [address] and has lived there since August this year. They live in a one story house, patient utilizes a wheelchair to mobilize and depends on family for assistance with feeding, bathing, and toileting. Patient's daughter or granddaughter transport to appointments, no reported issues with obtaining medications. Patient is current with home hospice, At [address], would like for patient to return home with hospice once d/c, referral sent via kevin. CM continues to follow and monitor for needs.

## 2022-11-25 NOTE — PROGRESS NOTES
Bedside and Verbal shift change report given to Nanci Otoole (oncoming nurse) by Son Espinoza LPN (offgoing nurse). Report included the following information SBAR, Procedure Summary, Intake/Output, MAR, Accordion, and Recent Results.

## 2022-11-26 VITALS
DIASTOLIC BLOOD PRESSURE: 81 MMHG | HEART RATE: 82 BPM | SYSTOLIC BLOOD PRESSURE: 185 MMHG | WEIGHT: 127.65 LBS | RESPIRATION RATE: 17 BRPM | BODY MASS INDEX: 25.06 KG/M2 | TEMPERATURE: 98.2 F | HEIGHT: 60 IN | OXYGEN SATURATION: 100 %

## 2022-11-26 LAB
ANION GAP SERPL CALC-SCNC: 8 MMOL/L (ref 5–15)
BUN SERPL-MCNC: 23 MG/DL (ref 6–20)
BUN/CREAT SERPL: 20 (ref 12–20)
CA-I BLD-MCNC: 8.7 MG/DL (ref 8.5–10.1)
CHLORIDE SERPL-SCNC: 109 MMOL/L (ref 97–108)
CO2 SERPL-SCNC: 23 MMOL/L (ref 21–32)
CREAT SERPL-MCNC: 1.14 MG/DL (ref 0.55–1.02)
ERYTHROCYTE [DISTWIDTH] IN BLOOD BY AUTOMATED COUNT: 14.4 % (ref 11.5–14.5)
GLUCOSE SERPL-MCNC: 127 MG/DL (ref 65–100)
HCT VFR BLD AUTO: 36.1 % (ref 35–47)
HGB BLD-MCNC: 11.9 G/DL (ref 11.5–16)
MCH RBC QN AUTO: 29.8 PG (ref 26–34)
MCHC RBC AUTO-ENTMCNC: 33 G/DL (ref 30–36.5)
MCV RBC AUTO: 90.5 FL (ref 80–99)
NRBC # BLD: 0 K/UL (ref 0–0.01)
NRBC BLD-RTO: 0 PER 100 WBC
PLATELET # BLD AUTO: 194 K/UL (ref 150–400)
PMV BLD AUTO: 10.8 FL (ref 8.9–12.9)
POTASSIUM SERPL-SCNC: 3.8 MMOL/L (ref 3.5–5.1)
RBC # BLD AUTO: 3.99 M/UL (ref 3.8–5.2)
SODIUM SERPL-SCNC: 140 MMOL/L (ref 136–145)
WBC # BLD AUTO: 6.8 K/UL (ref 3.6–11)

## 2022-11-26 PROCEDURE — 80048 BASIC METABOLIC PNL TOTAL CA: CPT

## 2022-11-26 PROCEDURE — 74011000250 HC RX REV CODE- 250: Performed by: INTERNAL MEDICINE

## 2022-11-26 PROCEDURE — 85027 COMPLETE CBC AUTOMATED: CPT

## 2022-11-26 PROCEDURE — 74011250637 HC RX REV CODE- 250/637: Performed by: NURSE PRACTITIONER

## 2022-11-26 PROCEDURE — 36415 COLL VENOUS BLD VENIPUNCTURE: CPT

## 2022-11-26 PROCEDURE — 74011250637 HC RX REV CODE- 250/637: Performed by: INTERNAL MEDICINE

## 2022-11-26 PROCEDURE — 74011250636 HC RX REV CODE- 250/636: Performed by: INTERNAL MEDICINE

## 2022-11-26 RX ORDER — OXYBUTYNIN CHLORIDE 5 MG/1
5 TABLET, EXTENDED RELEASE ORAL DAILY
Qty: 30 TABLET | Refills: 0 | Status: SHIPPED | OUTPATIENT
Start: 2022-11-26 | End: 2022-12-26

## 2022-11-26 RX ORDER — VERAPAMIL HYDROCHLORIDE 120 MG/1
240 CAPSULE, EXTENDED RELEASE ORAL DAILY
Status: DISCONTINUED | OUTPATIENT
Start: 2022-11-26 | End: 2022-11-26 | Stop reason: HOSPADM

## 2022-11-26 RX ORDER — AMOXICILLIN AND CLAVULANATE POTASSIUM 875; 125 MG/1; MG/1
1 TABLET, FILM COATED ORAL EVERY 12 HOURS
Qty: 10 TABLET | Refills: 0 | Status: SHIPPED | OUTPATIENT
Start: 2022-11-26 | End: 2022-11-26

## 2022-11-26 RX ORDER — CEPHALEXIN 500 MG/1
500 CAPSULE ORAL 4 TIMES DAILY
Qty: 20 CAPSULE | Refills: 0 | Status: SHIPPED | OUTPATIENT
Start: 2022-11-26 | End: 2022-12-01

## 2022-11-26 RX ORDER — LISINOPRIL 20 MG/1
20 TABLET ORAL DAILY
Status: DISCONTINUED | OUTPATIENT
Start: 2022-11-26 | End: 2022-11-26 | Stop reason: HOSPADM

## 2022-11-26 RX ORDER — METOPROLOL TARTRATE 25 MG/1
25 TABLET, FILM COATED ORAL 2 TIMES DAILY
Status: DISCONTINUED | OUTPATIENT
Start: 2022-11-26 | End: 2022-11-26 | Stop reason: HOSPADM

## 2022-11-26 RX ORDER — HYDRALAZINE HYDROCHLORIDE 50 MG/1
50 TABLET, FILM COATED ORAL 3 TIMES DAILY
Status: DISCONTINUED | OUTPATIENT
Start: 2022-11-26 | End: 2022-11-26 | Stop reason: HOSPADM

## 2022-11-26 RX ORDER — ISOSORBIDE DINITRATE 20 MG/1
10 TABLET ORAL 3 TIMES DAILY
Status: DISCONTINUED | OUTPATIENT
Start: 2022-11-26 | End: 2022-11-26 | Stop reason: HOSPADM

## 2022-11-26 RX ADMIN — LISINOPRIL 20 MG: 20 TABLET ORAL at 08:35

## 2022-11-26 RX ADMIN — FERROUS SULFATE TAB 325 MG (65 MG ELEMENTAL FE) 325 MG: 325 (65 FE) TAB at 08:14

## 2022-11-26 RX ADMIN — ISOSORBIDE DINITRATE 10 MG: 20 TABLET ORAL at 08:35

## 2022-11-26 RX ADMIN — Medication 2000 UNITS: at 08:15

## 2022-11-26 RX ADMIN — OSELTAMIVIR PHOSPHATE 75 MG: 75 CAPSULE ORAL at 08:14

## 2022-11-26 RX ADMIN — ALLOPURINOL 300 MG: 100 TABLET ORAL at 08:15

## 2022-11-26 RX ADMIN — METOPROLOL TARTRATE 25 MG: 25 TABLET, FILM COATED ORAL at 08:35

## 2022-11-26 RX ADMIN — HYDRALAZINE HYDROCHLORIDE 50 MG: 50 TABLET, FILM COATED ORAL at 08:35

## 2022-11-26 RX ADMIN — ALOGLIPTIN 6.25 MG: 6.25 TABLET, FILM COATED ORAL at 08:15

## 2022-11-26 RX ADMIN — ACETAMINOPHEN 650 MG: 325 TABLET ORAL at 08:35

## 2022-11-26 RX ADMIN — SODIUM CHLORIDE, PRESERVATIVE FREE 10 ML: 5 INJECTION INTRAVENOUS at 05:26

## 2022-11-26 RX ADMIN — HYDRALAZINE HYDROCHLORIDE 10 MG: 20 INJECTION, SOLUTION INTRAMUSCULAR; INTRAVENOUS at 03:37

## 2022-11-26 RX ADMIN — ENOXAPARIN SODIUM 30 MG: 100 INJECTION SUBCUTANEOUS at 08:14

## 2022-11-26 RX ADMIN — VERAPAMIL HYDROCHLORIDE 240 MG: 120 CAPSULE, EXTENDED RELEASE ORAL at 09:00

## 2022-11-26 NOTE — PROGRESS NOTES
Problem: Gas Exchange - Impaired  Goal: *Absence of hypoxia  Outcome: Progressing Towards Goal  Note: Patient is on room air and her O2 saturations are remaining above 90%     Bedside shift change report given to Ayaan Sawyer RN (oncoming nurse) by Pauline Jeong RN (offgoing nurse). Report included the following information SBAR, Kardex, ED Summary, Intake/Output, MAR, Accordion, and Cardiac Rhythm NSR .

## 2022-11-26 NOTE — PROGRESS NOTES
Patient is clear to d/c from  to home with a resumption of hospice care provided by At [facility]. , hospice agency will send nurse to readmit patient this date. JULIA spoke with patient's daughter, Kaylah Jerez [phone], to notify, she states she is on the way to the hospital to transport patient home. Nurse notified.

## 2022-11-26 NOTE — PROGRESS NOTES
Bedside and Verbal shift change report given to Nanci Otoole (oncoming nurse) by Makeda Ramires LPN (offgoing nurse). Report included the following information SBAR, Kardex, Procedure Summary, Intake/Output, MAR, and Accordion.

## 2022-11-26 NOTE — PROGRESS NOTES
This nurse bladder scanned this patient. Bladder scan recording showed  698 this nurse received orders from PA to insert phillips catheter.

## 2022-11-26 NOTE — DISCHARGE SUMMARY
Admit date: 11/24/2022   Admitting Provider: Mariaa Hampton MD    Discharge date: 11/26/2022  Discharging Provider: Danilo Espinoza NP      * Admission Diagnoses: Influenza A [J10.1]    * Discharge Diagnoses:    Hospital Problems as of 11/26/2022 Date Reviewed: 9/19/2022            Codes Class Noted - Resolved POA    * (Principal) Influenza A ICD-10-CM: J10.1  ICD-9-CM: 487.1  11/24/2022 - Present Unknown           * Hospital Course: Corey Gong is a 80 y.o. female with history of diabetes and congestive heart failure presenting to the ED with complaints of intermittent nonproductive cough for the last 2 days. Notes poor appetite and decreased oral intake. No reported fevers but intermittent chills early this morning. Chest x-ray negative for consolidation. Influenza A+. Blood cultures obtained and patient empirically started on Rocephin and oral Tamiflu. Will be admitted for gentle IV fluid hydration and antibiotics. UTI shows preliminary enterococcus will discharge on augmentin x 5 days. Urinary retention during course, phillips catheter placed. Patient to discharge home with hospice resumption. * Procedures:   * No surgery found *      Consults: None    Significant Diagnostic Studies:   Results       Procedure Component Value Units Date/Time    CULTURE, BLOOD, PAIRED [[id number]] Collected: 11/24/22 1513    Order Status: Completed Specimen: Blood Updated: 11/26/22 0015     Special Requests: No Special Requests        Culture result: No growth 2 days       CULTURE, BLOOD [[id number]] Collected: 11/24/22 1445    Order Status: Canceled Specimen: Blood     COVID-19 RAPID TEST [[id number]] Collected: 11/24/22 1206    Order Status: Completed Specimen: Nasopharyngeal Updated: 11/24/22 1253     COVID-19 rapid test Not Detected        Comment: Rapid Abbott ID Now   The specimen is NEGATIVE for SARS-CoV2, the novel coronavirus associated with COVID-19. A negative result does not rule out COVID-19.    This test has been authorized by the FDA under an Emergency Use Authorization (EUA) for use by authorized laboratories. Fact sheet for Healthcare Providers:  http://www.irene.amena/ Fact sheet for Patients: http://www.irene.amena/   Methodology: Isothermal Nucleic Acid Amplification       INFLUENZA A & B AG (RAPID TEST) [[id number]]  (Abnormal) Collected: 11/24/22 1206    Order Status: Completed Specimen: Nasopharyngeal from Nasal washing Updated: 11/24/22 1256     Influenza A Antigen Positive        Comment: CALLED TO AND READ BACK BY Amira Chun RN AT 4448 BY MO 11/24/2022        Influenza B Antigen Negative       CULTURE, URINE [[id number]] Collected: 11/24/22 1206    Order Status: Completed Specimen: Urine Updated: 11/25/22 1411     Special Requests: --        No Special Requests  Reflexed from [id number]       Baskin Count --        >100,000  colonies/ml       Culture result:       Probable Enterococcus species                  Recent Results (from the past 24 hour(s))   GLUCOSE, POC    Collection Time: 11/25/22 11:00 AM   Result Value Ref Range    Glucose (POC) 98 65 - 100 mg/dL    Performed by Kallie García    GLUCOSE, POC    Collection Time: 11/25/22  3:48 PM   Result Value Ref Range    Glucose (POC) 84 65 - 100 mg/dL    Performed by Kallie García    GLUCOSE, POC    Collection Time: 11/25/22  7:47 PM   Result Value Ref Range    Glucose (POC) 99 65 - 100 mg/dL    Performed by Ashkum Chamberlain          Discharge Exam:  Physical Exam  Vitals and nursing note reviewed. Constitutional:       Comments: Fatigue appearing   Eyes:      Extraocular Movements: Extraocular movements intact. Cardiovascular:      Rate and Rhythm: Normal rate. Abdominal:      Palpations: Abdomen is soft. Genitourinary:     Comments: Gordillo insitu  Skin:     General: Skin is warm and dry. Neurological:      Mental Status: She is alert and oriented to person, place, and time.          * Discharge Condition: improved  * Disposition: Home    Discharge Medications:  Current Discharge Medication List        START taking these medications    Details   oxybutynin chloride XL (DITROPAN XL) 5 mg CR tablet Take 1 Tablet by mouth daily for 30 days. Qty: 30 Tablet, Refills: 0  Start date: 11/26/2022, End date: 12/26/2022      cephALEXin (Keflex) 500 mg capsule Take 1 Capsule by mouth four (4) times daily for 5 days. Qty: 20 Capsule, Refills: 0  Start date: 11/26/2022, End date: 12/1/2022    Comments: DISCONTINUED AUGMENTIN DUE TO CROSS INTERACTION WITH TAMIFLU      oseltamivir (Tamiflu) 75 mg capsule Take 1 Capsule by mouth two (2) times a day for 5 days. Qty: 10 Capsule, Refills: 0  Start date: 11/24/2022, End date: 11/29/2022      ondansetron (ZOFRAN ODT) 4 mg disintegrating tablet Take 1 Tablet by mouth every eight (8) hours as needed for Nausea or Vomiting. Qty: 15 Tablet, Refills: 0  Start date: 11/24/2022           CONTINUE these medications which have NOT CHANGED    Details   lisinopriL (PRINIVIL, ZESTRIL) 20 mg tablet       Januvia 50 mg tablet       allopurinoL (ZYLOPRIM) 300 mg tablet Take 300 mg by mouth daily. melatonin 3 mg tablet Take 3 mg by mouth nightly. furosemide (LASIX) 40 mg tablet Take 40 mg by mouth daily. cholecalciferol, vitamin D3, 50 mcg (2,000 unit) tab Take 1 Tablet by mouth daily. ferrous sulfate 325 mg (65 mg iron) tablet Take 325 mg by mouth. insulin glargine (LANTUS) 100 unit/mL injection by SubCUTAneous route At bedtime. potassium chloride (KAON 10%) 20 mEq/15 mL solution Take  by mouth daily. STOP taking these medications       fexofenadine (ALLEGRA) 180 mg tablet Comments:   Reason for Stopping:             * Follow-up Care/Patient Instructions:   Activity: Activity as tolerated  Diet: Cardiac Diet  Wound Care: None needed    Follow-up Information       Follow up With Specialties Details Why Contact Info    Tereza Franco MD Family Medicine Follow up in 1 week(s)  [address] [facility]  [phone]      Rodrigo Ambrosio MD Urology Follow up in 2 week(s)  [address] [place] [id number]  [phone]            Time Spent: > 35 minutes    Signed:  Tip Lane NP  11/26/2022  8:28 AM

## 2022-11-27 LAB
BACTERIA SPEC CULT: ABNORMAL
COLONY COUNT,CNT: ABNORMAL
SPECIAL REQUESTS,SREQ: ABNORMAL

## 2025-01-13 NOTE — PROGRESS NOTES
PHYSICAL THERAPY TREATMENT  Patient: Lluvia Teixeira (1 y.o. female)  Date: 9/21/2022  Diagnosis: Hip fracture ([address] Angela Lapping <principal problem not specified>  Procedure(s) (LRB):  Open Reduction Internal Fixation Left Hip (Left) 2 Days Post-Op  Precautions:    Chart, physical therapy assessment, plan of care and goals were reviewed. ASSESSMENT  Patient continues with skilled PT services and is progressing towards goals. Patient seated in recliner chair upon approach and agreed to therapy session today. Patient was min A for STS to RW but reported 9/10 pain in Lt hip and was only able to stand for 5-7 seconds with flexed posture at hip and rounded shoulders before requesting to returned to sitting at min A 2/2 to pain. Patient then declined attempting further OOB mobility 2/2 to pain. Patient then performed seated TE (see details below). Patient then left seated in chair with call bell within reach and all needs meet. Current Level of Function Impacting Discharge (mobility/balance): impaired balance, general weakness, min A for STS<>. Pain limiting OOB mobility    Other factors to consider for discharge: PLOF, assistance at home, level of deficits, pain control         PLAN :  Patient continues to benefit from skilled intervention to address the above impairments. Continue treatment per established plan of care. to address goals. Recommendation for discharge: (in order for the patient to meet his/her long term goals)  [address]     This discharge recommendation:  Has been made in collaboration with the attending provider and/or case management    IF patient discharges home will need the following DME: rolling walker       SUBJECTIVE:   Patient stated Justyn Shepherd I sit down now.     OBJECTIVE DATA SUMMARY:   Critical Behavior:  Neurologic State: Alert  Orientation Level: Oriented to person, Oriented to place  Cognition: Follows commands  Safety/Judgement: Awareness of This nurse attempted second IV - able to get pediatric blood culture, unable to establish IV.  Pt refusing any more needle sticks.     environment  Functional Mobility Training:  Transfers:  Sit to Stand: Minimum assistance  Stand to Sit: Minimum assistance  Balance:  Sitting: Intact; Without support  Standing: Impaired; With support  Standing - Static: Fair;Constant support      Therapeutic Exercises:   1x20 AP  1x15 LAQ  1x10 seated marches ( Rt side only)  Pain Ratin-9/10 in Lt hip and back     Activity Tolerance:   Bed locked and in lowest position Fair and requires rest breaks  Please refer to the flowsheet for vital signs taken during this treatment. After treatment patient left in no apparent distress:   Bed returned to lowest position, Sitting in chair and Call bell within reach    COMMUNICATION/COLLABORATION:   The patients plan of care was discussed with: Registered nurse. Problem: Mobility Impaired (Adult and Pediatric)  Goal: *Acute Goals and Plan of Care (Insert Text)  Description: Patient stated goal: I want to go home  Patient will move from supine to sit and sit to supine , scoot up and down, and roll side to side in bed with minimal assistance/contact guard assist within 7 day(s). Patient will transfer from bed to chair and chair to bed with minimal assistance/contact guard assist using the least restrictive device within 7 day(s). Patient will improve static standing balance to minimal assistance within 1 week(s). Patient will ambulate 40 feet with minimal assistance with least restrictive device within 1 weeks.      Outcome: Progressing Towards Goal       Bess Ortega PTA   Time Calculation: 29 mins

## (undated) DEVICE — DRAPE EQUIP C ARM 74X42 IN MOB XR W/ TIE RUBBER BND LF

## (undated) DEVICE — DRAPE SHEET ULTRAGARD: Brand: MEDLINE

## (undated) DEVICE — C-ARMOR C-ARM EQUIPMENT COVERS CLEAR STERILE UNIVERSAL FIT 12 PER CASE: Brand: C-ARMOR

## (undated) DEVICE — SUTURE VCRL 1 L27IN ABSRB CT BRAID COAT UD J281H

## (undated) DEVICE — STERILE-Z STAND AND BACK TABLE COVER 66IN X 95IN: Brand: STERILE-Z

## (undated) DEVICE — 3.2MM GUIDE WIRE 400MM

## (undated) DEVICE — BIT DRL L330MM DIA4.2MM CALIB 100MM 3 FLUT QUIK CPL

## (undated) DEVICE — PREP SKN CHLRAPRP APL 26ML STR --

## (undated) DEVICE — GLOVE ORTHO 8   MSG9480

## (undated) DEVICE — MAJOR ORTHO PROCEDURE PACK: Brand: MEDLINE INDUSTRIES, INC.

## (undated) DEVICE — GLOVE SURG SZ 8 L12IN FNGR THK79MIL GRN LTX FREE

## (undated) DEVICE — SUTURE VCRL SZ 2-0 L27IN ABSRB VLT L36MM CT-1 1/2 CIR J339H

## (undated) DEVICE — SOUTHSIDE TURNOVER: Brand: MEDLINE INDUSTRIES, INC.

## (undated) DEVICE — TUBING, SUCTION, 1/4" X 12', STRAIGHT: Brand: MEDLINE

## (undated) DEVICE — DRAPE SURG U ADH STRP 48X53IN IMPERV STD N FEN W/O FLD PCH

## (undated) DEVICE — PADDING CST 4IN STERILE --

## (undated) DEVICE — PADDING CST 6IN STERILE --

## (undated) DEVICE — DRAPE,TOP,102X53,STERILE: Brand: MEDLINE

## (undated) DEVICE — BLADE SCALPEL STERILE NO 10